# Patient Record
Sex: MALE | NOT HISPANIC OR LATINO | Employment: OTHER | URBAN - NONMETROPOLITAN AREA
[De-identification: names, ages, dates, MRNs, and addresses within clinical notes are randomized per-mention and may not be internally consistent; named-entity substitution may affect disease eponyms.]

---

## 2023-02-27 PROBLEM — M79.605 LEG PAIN, LEFT: Status: ACTIVE | Noted: 2023-02-27

## 2023-02-27 PROBLEM — I83.90 VARICOSE VEIN OF LEG: Status: ACTIVE | Noted: 2023-02-27

## 2023-02-27 PROBLEM — D49.2 NEOPLASM OF SKIN OF NOSE: Status: ACTIVE | Noted: 2023-02-27

## 2023-02-27 PROBLEM — E53.8 VITAMIN B12 DEFICIENCY: Status: ACTIVE | Noted: 2023-02-27

## 2023-02-27 PROBLEM — R60.0 LOWER LEG EDEMA: Status: ACTIVE | Noted: 2023-02-27

## 2023-02-27 PROBLEM — L57.0 ACTINIC KERATOSES: Status: ACTIVE | Noted: 2023-02-27

## 2023-02-27 PROBLEM — I10 BENIGN ESSENTIAL HYPERTENSION: Status: ACTIVE | Noted: 2023-02-27

## 2023-02-27 PROBLEM — D49.2 SKIN NEOPLASM: Status: ACTIVE | Noted: 2023-02-27

## 2023-02-27 PROBLEM — C44.321 SQUAMOUS CELL CARCINOMA OF NOSE: Status: ACTIVE | Noted: 2023-02-27

## 2023-02-27 PROBLEM — G20.A1 PARKINSON'S DISEASE (MULTI): Status: ACTIVE | Noted: 2023-02-27

## 2023-02-27 PROBLEM — E11.65 TYPE 2 DIABETES MELLITUS WITH HYPERGLYCEMIA, WITHOUT LONG-TERM CURRENT USE OF INSULIN (MULTI): Status: ACTIVE | Noted: 2023-02-27

## 2023-02-27 PROBLEM — C61 MALIGNANT NEOPLASM OF PROSTATE (MULTI): Status: ACTIVE | Noted: 2023-02-27

## 2023-02-27 PROBLEM — H40.9 GLAUCOMA: Status: ACTIVE | Noted: 2023-02-27

## 2023-02-27 PROBLEM — E11.42 POLYNEUROPATHY, DIABETIC (MULTI): Status: ACTIVE | Noted: 2023-02-27

## 2023-02-27 PROBLEM — H26.9 CATARACTS, BILATERAL: Status: ACTIVE | Noted: 2023-02-27

## 2023-02-27 PROBLEM — M25.9 DISORDER OF JOINT OF PELVIC REGION AND THIGH: Status: ACTIVE | Noted: 2023-02-27

## 2023-02-27 PROBLEM — K40.90 INGUINAL HERNIA, LEFT: Status: ACTIVE | Noted: 2023-02-27

## 2023-02-27 PROBLEM — G56.20 ULNAR NERVE PALSY: Status: ACTIVE | Noted: 2023-02-27

## 2023-02-27 PROBLEM — B35.3 TINEA PEDIS OF BOTH FEET: Status: ACTIVE | Noted: 2023-02-27

## 2023-02-27 PROBLEM — C44.311 BASAL CELL CARCINOMA OF RIGHT SIDE OF NOSE: Status: ACTIVE | Noted: 2023-02-27

## 2023-02-27 PROBLEM — I48.19 PERSISTENT ATRIAL FIBRILLATION (MULTI): Status: ACTIVE | Noted: 2023-02-27

## 2023-02-27 PROBLEM — D72.829 LEUKOCYTOSIS: Status: ACTIVE | Noted: 2023-02-27

## 2023-02-27 PROBLEM — R41.3 MEMORY DYSFUNCTION: Status: ACTIVE | Noted: 2023-02-27

## 2023-02-27 PROBLEM — E55.9 VITAMIN D DEFICIENCY: Status: ACTIVE | Noted: 2023-02-27

## 2023-02-27 PROBLEM — L72.0 EPIDERMAL CYST: Status: ACTIVE | Noted: 2023-02-27

## 2023-02-27 PROBLEM — R63.4 ABNORMAL WEIGHT LOSS: Status: ACTIVE | Noted: 2023-02-27

## 2023-02-27 PROBLEM — E78.00 ELEVATED LOW DENSITY LIPOPROTEIN (LDL) CHOLESTEROL LEVEL: Status: ACTIVE | Noted: 2023-02-27

## 2023-02-27 PROBLEM — R41.3 POOR MEMORY: Status: ACTIVE | Noted: 2023-02-27

## 2023-02-27 PROBLEM — E83.42 HYPOMAGNESEMIA: Status: ACTIVE | Noted: 2023-02-27

## 2023-02-27 RX ORDER — LISINOPRIL AND HYDROCHLOROTHIAZIDE 12.5; 2 MG/1; MG/1
1 TABLET ORAL DAILY
COMMUNITY
Start: 2012-06-15 | End: 2023-07-24 | Stop reason: SDUPTHER

## 2023-02-27 RX ORDER — METOPROLOL TARTRATE 25 MG/1
1 TABLET, FILM COATED ORAL 2 TIMES DAILY
COMMUNITY
Start: 2022-01-13 | End: 2023-07-24 | Stop reason: SDUPTHER

## 2023-02-27 RX ORDER — GLIPIZIDE 5 MG/1
1 TABLET ORAL 2 TIMES DAILY
COMMUNITY
Start: 2014-03-13 | End: 2023-07-24 | Stop reason: SDUPTHER

## 2023-02-27 RX ORDER — CYANOCOBALAMIN 1000 UG/ML
1 INJECTION, SOLUTION INTRAMUSCULAR; SUBCUTANEOUS
COMMUNITY
Start: 2014-08-27 | End: 2023-11-08 | Stop reason: WASHOUT

## 2023-02-27 RX ORDER — SIMVASTATIN 20 MG/1
1 TABLET, FILM COATED ORAL NIGHTLY
COMMUNITY
Start: 2012-06-15 | End: 2023-07-07

## 2023-02-27 RX ORDER — BLOOD SUGAR DIAGNOSTIC
STRIP MISCELLANEOUS
COMMUNITY
Start: 2012-06-15 | End: 2023-11-08 | Stop reason: WASHOUT

## 2023-02-27 RX ORDER — METFORMIN HYDROCHLORIDE 1000 MG/1
1 TABLET ORAL
COMMUNITY
Start: 2012-06-15 | End: 2023-07-24 | Stop reason: SDUPTHER

## 2023-02-27 RX ORDER — KETOCONAZOLE 20 MG/G
CREAM TOPICAL
COMMUNITY
Start: 2018-05-15 | End: 2023-11-08 | Stop reason: WASHOUT

## 2023-02-27 RX ORDER — ACETAMINOPHEN 500 MG
1 TABLET ORAL DAILY
COMMUNITY
End: 2023-11-08 | Stop reason: WASHOUT

## 2023-03-27 ENCOUNTER — CLINICAL SUPPORT (OUTPATIENT)
Dept: PRIMARY CARE | Facility: CLINIC | Age: 83
End: 2023-03-27
Payer: MEDICARE

## 2023-03-27 DIAGNOSIS — E53.8 VITAMIN B12 DEFICIENCY: ICD-10-CM

## 2023-03-27 DIAGNOSIS — E53.8 VITAMIN B12 DEFICIENCY: Primary | ICD-10-CM

## 2023-03-27 PROCEDURE — 96372 THER/PROPH/DIAG INJ SC/IM: CPT | Performed by: FAMILY MEDICINE

## 2023-03-27 RX ORDER — CYANOCOBALAMIN 1000 UG/ML
1000 INJECTION, SOLUTION INTRAMUSCULAR; SUBCUTANEOUS ONCE
Status: COMPLETED | OUTPATIENT
Start: 2023-03-27 | End: 2023-03-27

## 2023-03-27 RX ADMIN — CYANOCOBALAMIN 1000 MCG: 1000 INJECTION, SOLUTION INTRAMUSCULAR; SUBCUTANEOUS at 08:07

## 2023-04-07 NOTE — PROGRESS NOTES
Subjective   Patient ID: Johnnie Rudolph is a 82 y.o. male who presents for No chief complaint on file..    HPI     Review of Systems    Objective   There were no vitals taken for this visit.    Physical Exam    Assessment/Plan

## 2023-04-24 ENCOUNTER — CLINICAL SUPPORT (OUTPATIENT)
Dept: PRIMARY CARE | Facility: CLINIC | Age: 83
End: 2023-04-24
Payer: MEDICARE

## 2023-04-24 DIAGNOSIS — E53.8 VITAMIN B12 DEFICIENCY: Primary | ICD-10-CM

## 2023-04-24 PROCEDURE — 96372 THER/PROPH/DIAG INJ SC/IM: CPT | Performed by: FAMILY MEDICINE

## 2023-04-24 RX ORDER — CYANOCOBALAMIN 1000 UG/ML
1000 INJECTION, SOLUTION INTRAMUSCULAR; SUBCUTANEOUS
Status: DISCONTINUED | OUTPATIENT
Start: 2023-04-25 | End: 2023-10-19

## 2023-04-24 RX ADMIN — CYANOCOBALAMIN 1000 MCG: 1000 INJECTION, SOLUTION INTRAMUSCULAR; SUBCUTANEOUS at 10:20

## 2023-05-31 ENCOUNTER — CLINICAL SUPPORT (OUTPATIENT)
Dept: PRIMARY CARE | Facility: CLINIC | Age: 83
End: 2023-05-31
Payer: MEDICARE

## 2023-05-31 DIAGNOSIS — E53.8 VITAMIN B12 DEFICIENCY: Primary | ICD-10-CM

## 2023-05-31 PROCEDURE — 96372 THER/PROPH/DIAG INJ SC/IM: CPT | Performed by: FAMILY MEDICINE

## 2023-05-31 RX ORDER — CYANOCOBALAMIN 1000 UG/ML
1000 INJECTION, SOLUTION INTRAMUSCULAR; SUBCUTANEOUS ONCE
Status: COMPLETED | OUTPATIENT
Start: 2023-05-31 | End: 2023-05-31

## 2023-05-31 RX ADMIN — CYANOCOBALAMIN 1000 MCG: 1000 INJECTION, SOLUTION INTRAMUSCULAR; SUBCUTANEOUS at 08:08

## 2023-06-28 ENCOUNTER — CLINICAL SUPPORT (OUTPATIENT)
Dept: PRIMARY CARE | Facility: CLINIC | Age: 83
End: 2023-06-28
Payer: MEDICARE

## 2023-06-28 DIAGNOSIS — E53.8 VITAMIN B12 DEFICIENCY: ICD-10-CM

## 2023-06-28 PROCEDURE — 96372 THER/PROPH/DIAG INJ SC/IM: CPT | Performed by: FAMILY MEDICINE

## 2023-06-28 RX ADMIN — CYANOCOBALAMIN 1000 MCG: 1000 INJECTION, SOLUTION INTRAMUSCULAR; SUBCUTANEOUS at 09:12

## 2023-07-07 DIAGNOSIS — E78.00 ELEVATED LOW DENSITY LIPOPROTEIN (LDL) CHOLESTEROL LEVEL: Primary | ICD-10-CM

## 2023-07-07 RX ORDER — SIMVASTATIN 20 MG/1
TABLET, FILM COATED ORAL
Qty: 30 TABLET | Refills: 0 | Status: SHIPPED | OUTPATIENT
Start: 2023-07-07 | End: 2023-07-24 | Stop reason: SDUPTHER

## 2023-07-24 ENCOUNTER — OFFICE VISIT (OUTPATIENT)
Dept: PRIMARY CARE | Facility: CLINIC | Age: 83
End: 2023-07-24
Payer: MEDICARE

## 2023-07-24 VITALS
BODY MASS INDEX: 27.44 KG/M2 | DIASTOLIC BLOOD PRESSURE: 60 MMHG | OXYGEN SATURATION: 98 % | WEIGHT: 208 LBS | HEART RATE: 64 BPM | SYSTOLIC BLOOD PRESSURE: 132 MMHG

## 2023-07-24 DIAGNOSIS — G20.A1 PARKINSON'S DISEASE (MULTI): ICD-10-CM

## 2023-07-24 DIAGNOSIS — E53.8 VITAMIN B12 DEFICIENCY: ICD-10-CM

## 2023-07-24 DIAGNOSIS — C44.321 SQUAMOUS CELL CARCINOMA OF NOSE: ICD-10-CM

## 2023-07-24 DIAGNOSIS — E11.65 TYPE 2 DIABETES MELLITUS WITH HYPERGLYCEMIA, WITHOUT LONG-TERM CURRENT USE OF INSULIN (MULTI): Primary | ICD-10-CM

## 2023-07-24 DIAGNOSIS — C61 MALIGNANT NEOPLASM OF PROSTATE (MULTI): ICD-10-CM

## 2023-07-24 DIAGNOSIS — I48.19 PERSISTENT ATRIAL FIBRILLATION (MULTI): ICD-10-CM

## 2023-07-24 DIAGNOSIS — S80.811A CAT SCRATCH OF RIGHT LOWER LEG, INITIAL ENCOUNTER: ICD-10-CM

## 2023-07-24 DIAGNOSIS — E78.00 ELEVATED LOW DENSITY LIPOPROTEIN (LDL) CHOLESTEROL LEVEL: ICD-10-CM

## 2023-07-24 DIAGNOSIS — E11.42 DIABETIC POLYNEUROPATHY ASSOCIATED WITH TYPE 2 DIABETES MELLITUS (MULTI): ICD-10-CM
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
## 2023-07-24 DIAGNOSIS — L57.0 ACTINIC KERATOSES: ICD-10-CM

## 2023-07-24 DIAGNOSIS — I10 BENIGN ESSENTIAL HYPERTENSION: ICD-10-CM

## 2023-07-24 PROBLEM — D49.2 SKIN NEOPLASM: Status: RESOLVED | Noted: 2023-02-27 | Resolved: 2023-07-24

## 2023-07-24 PROBLEM — D49.2 NEOPLASM OF SKIN OF NOSE: Status: RESOLVED | Noted: 2023-02-27 | Resolved: 2023-07-24

## 2023-07-24 LAB
ALBUMIN (MG/L) IN URINE: 30.5 MG/L
ALBUMIN/CREATININE (UG/MG) IN URINE: 28.5 UG/MG CRT (ref 0–30)
ANION GAP IN SER/PLAS: 17 MMOL/L (ref 10–20)
CALCIUM (MG/DL) IN SER/PLAS: 9.3 MG/DL (ref 8.6–10.3)
CARBON DIOXIDE, TOTAL (MMOL/L) IN SER/PLAS: 27 MMOL/L (ref 21–32)
CHLORIDE (MMOL/L) IN SER/PLAS: 101 MMOL/L (ref 98–107)
CHOLESTEROL (MG/DL) IN SER/PLAS: 154 MG/DL (ref 0–199)
CHOLESTEROL IN HDL (MG/DL) IN SER/PLAS: 55.6 MG/DL
CHOLESTEROL/HDL RATIO: 2.8
CREATININE (MG/DL) IN SER/PLAS: 0.84 MG/DL (ref 0.5–1.3)
CREATININE (MG/DL) IN URINE: 107 MG/DL (ref 20–370)
GFR MALE: 87 ML/MIN/1.73M2
GLUCOSE (MG/DL) IN SER/PLAS: 128 MG/DL (ref 74–99)
LDL: 86 MG/DL (ref 0–99)
POC HEMOGLOBIN A1C: 7.2 % (ref 4.2–6.5)
POTASSIUM (MMOL/L) IN SER/PLAS: 4.7 MMOL/L (ref 3.5–5.3)
SODIUM (MMOL/L) IN SER/PLAS: 140 MMOL/L (ref 136–145)
TRIGLYCERIDE (MG/DL) IN SER/PLAS: 64 MG/DL (ref 0–149)
UREA NITROGEN (MG/DL) IN SER/PLAS: 21 MG/DL (ref 6–23)
VLDL: 13 MG/DL (ref 0–40)

## 2023-07-24 PROCEDURE — 3078F DIAST BP <80 MM HG: CPT | Performed by: FAMILY MEDICINE

## 2023-07-24 PROCEDURE — 3075F SYST BP GE 130 - 139MM HG: CPT | Performed by: FAMILY MEDICINE

## 2023-07-24 PROCEDURE — 1159F MED LIST DOCD IN RCRD: CPT | Performed by: FAMILY MEDICINE

## 2023-07-24 PROCEDURE — 1036F TOBACCO NON-USER: CPT | Performed by: FAMILY MEDICINE

## 2023-07-24 PROCEDURE — 1170F FXNL STATUS ASSESSED: CPT | Performed by: FAMILY MEDICINE

## 2023-07-24 PROCEDURE — 90471 IMMUNIZATION ADMIN: CPT | Performed by: FAMILY MEDICINE

## 2023-07-24 PROCEDURE — 90714 TD VACC NO PRESV 7 YRS+ IM: CPT | Performed by: FAMILY MEDICINE

## 2023-07-24 PROCEDURE — 82043 UR ALBUMIN QUANTITATIVE: CPT

## 2023-07-24 PROCEDURE — 82570 ASSAY OF URINE CREATININE: CPT

## 2023-07-24 PROCEDURE — 1160F RVW MEDS BY RX/DR IN RCRD: CPT | Performed by: FAMILY MEDICINE

## 2023-07-24 PROCEDURE — 96372 THER/PROPH/DIAG INJ SC/IM: CPT | Performed by: FAMILY MEDICINE

## 2023-07-24 PROCEDURE — 99214 OFFICE O/P EST MOD 30 MIN: CPT | Performed by: FAMILY MEDICINE

## 2023-07-24 PROCEDURE — 80061 LIPID PANEL: CPT

## 2023-07-24 PROCEDURE — 83036 HEMOGLOBIN GLYCOSYLATED A1C: CPT | Performed by: FAMILY MEDICINE

## 2023-07-24 PROCEDURE — 80048 BASIC METABOLIC PNL TOTAL CA: CPT

## 2023-07-24 PROCEDURE — 99397 PER PM REEVAL EST PAT 65+ YR: CPT | Performed by: FAMILY MEDICINE

## 2023-07-24 PROCEDURE — G0439 PPPS, SUBSEQ VISIT: HCPCS | Performed by: FAMILY MEDICINE

## 2023-07-24 RX ORDER — METFORMIN HYDROCHLORIDE 1000 MG/1
1000 TABLET ORAL
Qty: 180 TABLET | Refills: 1 | Status: SHIPPED | OUTPATIENT
Start: 2023-07-24 | End: 2024-01-20

## 2023-07-24 RX ORDER — LISINOPRIL AND HYDROCHLOROTHIAZIDE 12.5; 2 MG/1; MG/1
1 TABLET ORAL DAILY
Qty: 90 TABLET | Refills: 1 | Status: SHIPPED | OUTPATIENT
Start: 2023-07-24 | End: 2023-10-19 | Stop reason: SDUPTHER

## 2023-07-24 RX ORDER — SIMVASTATIN 20 MG/1
20 TABLET, FILM COATED ORAL NIGHTLY
Qty: 90 TABLET | Refills: 1 | Status: SHIPPED | OUTPATIENT
Start: 2023-07-24 | End: 2023-10-14 | Stop reason: HOSPADM

## 2023-07-24 RX ORDER — GLIPIZIDE 5 MG/1
5 TABLET ORAL 2 TIMES DAILY
Qty: 180 TABLET | Refills: 1 | Status: SHIPPED | OUTPATIENT
Start: 2023-07-24 | End: 2023-11-21 | Stop reason: SDUPTHER

## 2023-07-24 RX ORDER — METOPROLOL TARTRATE 25 MG/1
25 TABLET, FILM COATED ORAL 2 TIMES DAILY
Qty: 180 TABLET | Refills: 1 | Status: SHIPPED | OUTPATIENT
Start: 2023-07-24 | End: 2024-01-20

## 2023-07-24 RX ADMIN — CYANOCOBALAMIN 1000 MCG: 1000 INJECTION, SOLUTION INTRAMUSCULAR; SUBCUTANEOUS at 08:05

## 2023-07-24 ASSESSMENT — PATIENT HEALTH QUESTIONNAIRE - PHQ9
1. LITTLE INTEREST OR PLEASURE IN DOING THINGS: NOT AT ALL
2. FEELING DOWN, DEPRESSED OR HOPELESS: NOT AT ALL
SUM OF ALL RESPONSES TO PHQ9 QUESTIONS 1 AND 2: 0

## 2023-07-24 ASSESSMENT — ACTIVITIES OF DAILY LIVING (ADL)
GROCERY_SHOPPING: INDEPENDENT
MANAGING_FINANCES: INDEPENDENT
TAKING_MEDICATION: INDEPENDENT
DOING_HOUSEWORK: INDEPENDENT
BATHING: INDEPENDENT
DRESSING: INDEPENDENT

## 2023-07-24 NOTE — PATIENT INSTRUCTIONS
Shingles Vaccines recommended.  Continue to follow up with dermatology for actinic keratosis these can turn to cancer  Follow up 6 months   Have eye exam of eyes  Prevnar 20 vaccine

## 2023-07-24 NOTE — PROGRESS NOTES
Subjective   Reason for Visit: Johnnie Rudolph is an 83 y.o. male here for a Medicare Wellness visit.     Past Medical, Surgical, and Family History reviewed and updated in chart.    Reviewed all medications by prescribing practitioner or clinical pharmacist (such as prescriptions, OTCs, herbal therapies and supplements) and documented in the medical record.    HPI  For 6 month checkup many medical conditions occluding hypertension diabetes cognitive deficits/mild dementia persistent atrial fibrillation hypercholesterolemia skin cancer actinic keratosis glaucoma  Scratched by cat 2 weeks ago lower R leg   Needs eye exam  Feet numbness same   Does not see podiatry  Has had problems with tinea pedis in the past has longstanding prescription ketoconazole  Has onychomycosis    Patient Care Team:  Jason Olvera DO as PCP - General  Jason Olvera DO as PCP - [employer] PCP     Review of Systems    Objective   Vitals:  /60   Pulse 64   Wt 94.3 kg (208 lb)   SpO2 98%   BMI 27.44 kg/m²       Physical Exam  Cardiovascular:      Pulses:           Dorsalis pedis pulses are 1+ on the right side and 1+ on the left side.        Posterior tibial pulses are 1+ on the right side and 1+ on the left side.   Musculoskeletal:      Right foot: Normal range of motion. No deformity, bunion or prominent metatarsal heads.      Left foot: Normal range of motion. No deformity or prominent metatarsal heads.   Feet:      Right foot:      Protective Sensation: 6 sites tested.  3 sites sensed.      Skin integrity: Callus, dry skin and fissure present.      Toenail Condition: Right toenails are abnormally thick. Fungal disease present.     Left foot:      Protective Sensation: 6 sites tested.  4 sites sensed.      Skin integrity: Callus, dry skin and fissure present.      Toenail Condition: Left toenails are abnormally thick. Fungal disease present.      General: alert, no apparent distress  HEAD:  Normocephalic,  atraumatic,scalp covered in actinic keratosis     EARS:  EAC patent, TMs normal,   EYES:  sclera white, CALEB, conjunctiva noninjected  NOSE: Nasal passages patent , scar from skin Cancer removal   MOUTH: Pharynx clear, tongue uvula midline, grade 3 airway  Neck:  supple, no masses, thyroid non enlarged non nodular, no cervical adenopathy,  Lungs:  no wheezing, no rales , no rhonchi, normal respiratory pattern, breath sounds not diminished  Heart:  irregular rate and  rhythm   Abdomen:  soft NT,BS + ,  no organomegaly, no masses, no bruits  Extremities:  severe varicosities both legs, crusted area right lower leg,  sl eryth from cat scratch not healing trace  edema, no cyanosis, no clubbing,  2+ posterior tibialis pulse    Psych:  speech fluent, normal affect, answers most questions appropriately, have a hard time remembering last office visit with ophthalmologist name of person who took care of his skin cancer on his nose  Skin:  no rashes, no concerning skin lesions, normal texture      Assessment/Plan   Problem List Items Addressed This Visit       Actinic keratoses    Benign essential hypertension    Relevant Medications    lisinopriL-hydrochlorothiazide 20-12.5 mg tablet    metoprolol tartrate (Lopressor) 25 mg tablet    Other Relevant Orders    Lipid Panel (Completed)    Basic Metabolic Panel (Completed)    Cat scratch of right lower leg    Relevant Orders    Td vaccine, age 7 years and older (TENIVAC) (Completed)    Elevated low density lipoprotein (LDL) cholesterol level    Relevant Medications    simvastatin (Zocor) 20 mg tablet    Malignant neoplasm of prostate (CMS/HCC)    Parkinson's disease (CMS/HCC)    Persistent atrial fibrillation (CMS/HCC)    Relevant Medications    metoprolol tartrate (Lopressor) 25 mg tablet    rivaroxaban (Xarelto) 20 mg tablet    Polyneuropathy, diabetic (CMS/HCC)    Relevant Orders    POCT glycosylated hemoglobin (Hb A1C) manually resulted (Completed)    Albumin , Urine Random  (Completed)    Squamous cell carcinoma of nose    Type 2 diabetes mellitus with hyperglycemia, without long-term current use of insulin (CMS/HCC) - Primary    Relevant Medications    glipiZIDE (Glucotrol) 5 mg tablet    metFORMIN (Glucophage) 1,000 mg tablet    Other Relevant Orders    Lipid Panel (Completed)    Basic Metabolic Panel (Completed)    POCT glycosylated hemoglobin (Hb A1C) manually resulted (Completed)    Albumin , Urine Random (Completed)    Vitamin B12 deficiency   Medications renewed.  Patient was given tetanus booster because of cat scratch open area of lower leg crusted over mild erythema.  Highly encourage patient see ophthalmologist.  Patient with fairly high CHADS2 score he does have hypertension diabetes persistent atrial fibrillation and is over 76  Patient not even taken an aspirin at this point he declined medicines in the past discussed he is very high risk of stroke discussed possible bleeding he denies any episodes currently or remotely.  Did have a fall and many years ago over 11 had a subdural hematoma.  At 21 was still .  Patient agreeable to try Xarelto it may depend on cost however.  Patient poor historian at this point I would not trust him taking warfarin.   Highly encourage patient to have Shingrix vaccine at pharmacy and also Prevnar 20.

## 2023-08-25 ENCOUNTER — APPOINTMENT (OUTPATIENT)
Dept: PRIMARY CARE | Facility: CLINIC | Age: 83
End: 2023-08-25
Payer: MEDICARE

## 2023-08-28 ENCOUNTER — CLINICAL SUPPORT (OUTPATIENT)
Dept: PRIMARY CARE | Facility: CLINIC | Age: 83
End: 2023-08-28
Payer: MEDICARE

## 2023-08-28 DIAGNOSIS — E53.8 VITAMIN B12 DEFICIENCY: Primary | ICD-10-CM

## 2023-08-28 DIAGNOSIS — E53.8 VITAMIN B12 DEFICIENCY: ICD-10-CM

## 2023-08-28 PROCEDURE — 96372 THER/PROPH/DIAG INJ SC/IM: CPT | Performed by: FAMILY MEDICINE

## 2023-08-28 RX ORDER — CYANOCOBALAMIN 1000 UG/ML
1000 INJECTION, SOLUTION INTRAMUSCULAR; SUBCUTANEOUS
Status: DISCONTINUED | OUTPATIENT
Start: 2023-08-29 | End: 2023-10-19

## 2023-08-28 RX ADMIN — CYANOCOBALAMIN 1000 MCG: 1000 INJECTION, SOLUTION INTRAMUSCULAR; SUBCUTANEOUS at 17:35

## 2023-09-25 ENCOUNTER — CLINICAL SUPPORT (OUTPATIENT)
Dept: PRIMARY CARE | Facility: CLINIC | Age: 83
End: 2023-09-25
Payer: MEDICARE

## 2023-09-25 DIAGNOSIS — E53.8 VITAMIN B12 DEFICIENCY: ICD-10-CM

## 2023-09-25 PROCEDURE — 96372 THER/PROPH/DIAG INJ SC/IM: CPT | Performed by: FAMILY MEDICINE

## 2023-09-25 RX ORDER — CYANOCOBALAMIN 1000 UG/ML
1000 INJECTION, SOLUTION INTRAMUSCULAR; SUBCUTANEOUS ONCE
Status: COMPLETED | OUTPATIENT
Start: 2023-09-25 | End: 2023-09-25

## 2023-09-25 RX ADMIN — CYANOCOBALAMIN 1000 MCG: 1000 INJECTION, SOLUTION INTRAMUSCULAR; SUBCUTANEOUS at 08:04

## 2023-10-12 ENCOUNTER — APPOINTMENT (OUTPATIENT)
Dept: RADIOLOGY | Facility: HOSPITAL | Age: 83
DRG: 065 | End: 2023-10-12
Payer: MEDICARE

## 2023-10-12 ENCOUNTER — HOSPITAL ENCOUNTER (INPATIENT)
Facility: HOSPITAL | Age: 83
LOS: 1 days | Discharge: HOME | DRG: 065 | End: 2023-10-14
Attending: STUDENT IN AN ORGANIZED HEALTH CARE EDUCATION/TRAINING PROGRAM | Admitting: INTERNAL MEDICINE
Payer: MEDICARE

## 2023-10-12 DIAGNOSIS — I82.4Z2 DVT, LOWER EXTREMITY, DISTAL, ACUTE, LEFT (MULTI): ICD-10-CM

## 2023-10-12 DIAGNOSIS — R29.90 STROKE-LIKE SYMPTOMS: Primary | ICD-10-CM

## 2023-10-12 DIAGNOSIS — R60.0 LOCALIZED EDEMA: ICD-10-CM

## 2023-10-12 DIAGNOSIS — I63.89 OTHER CEREBRAL INFARCTION (MULTI): ICD-10-CM

## 2023-10-12 DIAGNOSIS — I48.19 PERSISTENT ATRIAL FIBRILLATION (MULTI): ICD-10-CM

## 2023-10-12 LAB
ALBUMIN SERPL BCP-MCNC: 3.8 G/DL (ref 3.4–5)
ALBUMIN SERPL BCP-MCNC: NORMAL G/DL
ALP SERPL-CCNC: 81 U/L (ref 33–136)
ALP SERPL-CCNC: NORMAL U/L
ALT SERPL W P-5'-P-CCNC: 16 U/L (ref 10–52)
ALT SERPL W P-5'-P-CCNC: NORMAL U/L
ANION GAP BLDV CALCULATED.4IONS-SCNC: 5 MMOL/L (ref 10–25)
ANION GAP SERPL CALC-SCNC: 12 MMOL/L (ref 10–20)
ANION GAP SERPL CALC-SCNC: NORMAL MMOL/L
APPARATUS: ABNORMAL
APPEARANCE UR: CLEAR
APTT PPP: 33 SECONDS (ref 27–38)
AST SERPL W P-5'-P-CCNC: 18 U/L (ref 9–39)
AST SERPL W P-5'-P-CCNC: NORMAL U/L
BASE EXCESS BLDV CALC-SCNC: 1.3 MMOL/L (ref -2–3)
BASOPHILS # BLD AUTO: 0.05 X10*3/UL (ref 0–0.1)
BASOPHILS NFR BLD AUTO: 0.7 %
BILIRUB SERPL-MCNC: 0.9 MG/DL (ref 0–1.2)
BILIRUB SERPL-MCNC: NORMAL MG/DL
BILIRUB UR STRIP.AUTO-MCNC: NEGATIVE MG/DL
BODY TEMPERATURE: 37 DEGREES CELSIUS
BUN SERPL-MCNC: 19 MG/DL (ref 6–23)
BUN SERPL-MCNC: NORMAL MG/DL
CA-I BLDV-SCNC: 1.11 MMOL/L (ref 1.1–1.33)
CALCIUM SERPL-MCNC: 8.8 MG/DL (ref 8.6–10.3)
CALCIUM SERPL-MCNC: NORMAL MG/DL
CARDIAC TROPONIN I PNL SERPL HS: 6 NG/L (ref 0–20)
CHLORIDE BLDV-SCNC: 110 MMOL/L (ref 98–107)
CHLORIDE SERPL-SCNC: 105 MMOL/L (ref 98–107)
CHLORIDE SERPL-SCNC: NORMAL MMOL/L
CO2 SERPL-SCNC: 26 MMOL/L (ref 21–32)
CO2 SERPL-SCNC: NORMAL MMOL/L
COHGB MFR BLDV: 1.3 %
COLOR UR: YELLOW
CREAT SERPL-MCNC: 0.79 MG/DL (ref 0.5–1.3)
CREAT SERPL-MCNC: NORMAL MG/DL
EOSINOPHIL # BLD AUTO: 0.1 X10*3/UL (ref 0–0.4)
EOSINOPHIL NFR BLD AUTO: 1.4 %
ERYTHROCYTE [DISTWIDTH] IN BLOOD BY AUTOMATED COUNT: 13.2 % (ref 11.5–14.5)
FLOW: 15 LPM
GFR SERPL CREATININE-BSD FRML MDRD: 89 ML/MIN/1.73M*2
GFR SERPL CREATININE-BSD FRML MDRD: NORMAL ML/MIN/{1.73_M2}
GLUCOSE BLD MANUAL STRIP-MCNC: 164 MG/DL (ref 74–99)
GLUCOSE BLDV-MCNC: 177 MG/DL (ref 74–99)
GLUCOSE SERPL-MCNC: 145 MG/DL (ref 74–99)
GLUCOSE SERPL-MCNC: NORMAL MG/DL
GLUCOSE UR STRIP.AUTO-MCNC: ABNORMAL MG/DL
HCO3 BLDV-SCNC: 25.9 MMOL/L (ref 22–26)
HCT VFR BLD AUTO: 42.6 % (ref 41–52)
HCT VFR BLD EST: 38 % (ref 41–52)
HGB BLD-MCNC: 14.2 G/DL (ref 13.5–17.5)
HGB BLDV-MCNC: 12.5 G/DL (ref 13.5–17.5)
IMM GRANULOCYTES # BLD AUTO: 0.06 X10*3/UL (ref 0–0.5)
IMM GRANULOCYTES NFR BLD AUTO: 0.8 % (ref 0–0.9)
INR PPP: 1.2 (ref 0.9–1.1)
KETONES UR STRIP.AUTO-MCNC: ABNORMAL MG/DL
LACTATE BLDV-SCNC: 1.9 MMOL/L (ref 0.4–2)
LEUKOCYTE ESTERASE UR QL STRIP.AUTO: NEGATIVE
LYMPHOCYTES # BLD AUTO: 1.23 X10*3/UL (ref 0.8–3)
LYMPHOCYTES NFR BLD AUTO: 17.1 %
MCH RBC QN AUTO: 31.5 PG (ref 26–34)
MCHC RBC AUTO-ENTMCNC: 33.3 G/DL (ref 32–36)
MCV RBC AUTO: 95 FL (ref 80–100)
METHGB MFR BLDV: 1.3 % (ref 0–1.5)
MONOCYTES # BLD AUTO: 0.78 X10*3/UL (ref 0.05–0.8)
MONOCYTES NFR BLD AUTO: 10.9 %
NEUTROPHILS # BLD AUTO: 4.96 X10*3/UL (ref 1.6–5.5)
NEUTROPHILS NFR BLD AUTO: 69.1 %
NITRITE UR QL STRIP.AUTO: NEGATIVE
NRBC BLD-RTO: 0 /100 WBCS (ref 0–0)
OXYHGB MFR BLDV: 72.3 % (ref 45–75)
PCO2 BLDV: 40 MM HG (ref 41–51)
PH BLDV: 7.42 PH (ref 7.33–7.43)
PH UR STRIP.AUTO: 6 [PH]
PLATELET # BLD AUTO: 236 X10*3/UL (ref 150–450)
PMV BLD AUTO: 10.2 FL (ref 7.5–11.5)
PO2 BLDV: 44 MM HG (ref 35–45)
POTASSIUM BLDV-SCNC: 3.7 MMOL/L (ref 3.5–5.3)
POTASSIUM SERPL-SCNC: 3.6 MMOL/L (ref 3.5–5.3)
POTASSIUM SERPL-SCNC: NORMAL MMOL/L
PROT SERPL-MCNC: 6.4 G/DL (ref 6.4–8.2)
PROT SERPL-MCNC: NORMAL G/DL
PROT UR STRIP.AUTO-MCNC: ABNORMAL MG/DL
PROTHROMBIN TIME: 13.7 SECONDS (ref 9.8–12.8)
RBC # BLD AUTO: 4.51 X10*6/UL (ref 4.5–5.9)
RBC # UR STRIP.AUTO: ABNORMAL /UL
RBC #/AREA URNS AUTO: NORMAL /HPF
SAO2 % BLDV: 74 % (ref 45–75)
SODIUM BLDV-SCNC: 137 MMOL/L (ref 136–145)
SODIUM SERPL-SCNC: 139 MMOL/L (ref 136–145)
SODIUM SERPL-SCNC: NORMAL MMOL/L
SP GR UR STRIP.AUTO: 1.02
UROBILINOGEN UR STRIP.AUTO-MCNC: 2 MG/DL
WBC # BLD AUTO: 7.2 X10*3/UL (ref 4.4–11.3)
WBC #/AREA URNS AUTO: NORMAL /HPF

## 2023-10-12 PROCEDURE — 70450 CT HEAD/BRAIN W/O DYE: CPT | Performed by: RADIOLOGY

## 2023-10-12 PROCEDURE — 81003 URINALYSIS AUTO W/O SCOPE: CPT | Performed by: STUDENT IN AN ORGANIZED HEALTH CARE EDUCATION/TRAINING PROGRAM

## 2023-10-12 PROCEDURE — 36415 COLL VENOUS BLD VENIPUNCTURE: CPT | Performed by: STUDENT IN AN ORGANIZED HEALTH CARE EDUCATION/TRAINING PROGRAM

## 2023-10-12 PROCEDURE — 85018 HEMOGLOBIN: CPT

## 2023-10-12 PROCEDURE — 85025 COMPLETE CBC W/AUTO DIFF WBC: CPT | Performed by: STUDENT IN AN ORGANIZED HEALTH CARE EDUCATION/TRAINING PROGRAM

## 2023-10-12 PROCEDURE — 82947 ASSAY GLUCOSE BLOOD QUANT: CPT

## 2023-10-12 PROCEDURE — 83718 ASSAY OF LIPOPROTEIN: CPT

## 2023-10-12 PROCEDURE — 85610 PROTHROMBIN TIME: CPT | Performed by: STUDENT IN AN ORGANIZED HEALTH CARE EDUCATION/TRAINING PROGRAM

## 2023-10-12 PROCEDURE — 85730 THROMBOPLASTIN TIME PARTIAL: CPT | Performed by: STUDENT IN AN ORGANIZED HEALTH CARE EDUCATION/TRAINING PROGRAM

## 2023-10-12 PROCEDURE — 70450 CT HEAD/BRAIN W/O DYE: CPT | Mod: MG

## 2023-10-12 PROCEDURE — 82330 ASSAY OF CALCIUM: CPT

## 2023-10-12 PROCEDURE — 83050 HGB METHEMOGLOBIN QUAN: CPT

## 2023-10-12 PROCEDURE — 99285 EMERGENCY DEPT VISIT HI MDM: CPT | Performed by: STUDENT IN AN ORGANIZED HEALTH CARE EDUCATION/TRAINING PROGRAM

## 2023-10-12 PROCEDURE — 82435 ASSAY OF BLOOD CHLORIDE: CPT | Performed by: STUDENT IN AN ORGANIZED HEALTH CARE EDUCATION/TRAINING PROGRAM

## 2023-10-12 PROCEDURE — 84132 ASSAY OF SERUM POTASSIUM: CPT | Performed by: STUDENT IN AN ORGANIZED HEALTH CARE EDUCATION/TRAINING PROGRAM

## 2023-10-12 PROCEDURE — 84484 ASSAY OF TROPONIN QUANT: CPT | Performed by: STUDENT IN AN ORGANIZED HEALTH CARE EDUCATION/TRAINING PROGRAM

## 2023-10-12 ASSESSMENT — PAIN - FUNCTIONAL ASSESSMENT: PAIN_FUNCTIONAL_ASSESSMENT: 0-10

## 2023-10-12 ASSESSMENT — PAIN SCALES - GENERAL
PAINLEVEL_OUTOF10: 0 - NO PAIN
PAINLEVEL_OUTOF10: 0 - NO PAIN

## 2023-10-12 NOTE — ED NOTES
"Pt w/ AMS all day per wife, noticed something \"different\" this am. States his speech and orientation was off per EMS. CO was elevated at home when pt picked up - 25. NRB 15L placed by EMS and down to 19 when at ED. FD states CO at home was 0. Pt denies smoking.  en route. Pt did have a fall 1 hour PTA, no LOC or hitting head per EMS. Pt has rx for Eliquis but spouse states  was last time filled because of cost. Pt is oriented to  but not person or place. VSS.      Harvinder Pace RN  10/12/23 1952    "

## 2023-10-13 ENCOUNTER — APPOINTMENT (OUTPATIENT)
Dept: VASCULAR MEDICINE | Facility: HOSPITAL | Age: 83
DRG: 065 | End: 2023-10-13
Payer: MEDICARE

## 2023-10-13 ENCOUNTER — APPOINTMENT (OUTPATIENT)
Dept: CARDIOLOGY | Facility: HOSPITAL | Age: 83
DRG: 065 | End: 2023-10-13
Payer: MEDICARE

## 2023-10-13 ENCOUNTER — APPOINTMENT (OUTPATIENT)
Dept: RADIOLOGY | Facility: HOSPITAL | Age: 83
DRG: 065 | End: 2023-10-13
Payer: MEDICARE

## 2023-10-13 ENCOUNTER — PHARMACY VISIT (OUTPATIENT)
Dept: PHARMACY | Facility: CLINIC | Age: 83
End: 2023-10-13
Payer: MEDICARE

## 2023-10-13 PROBLEM — R29.90 STROKE-LIKE SYMPTOMS: Status: ACTIVE | Noted: 2023-10-13

## 2023-10-13 LAB
ALBUMIN SERPL BCP-MCNC: 3.7 G/DL (ref 3.4–5)
ALP SERPL-CCNC: 83 U/L (ref 33–136)
ALT SERPL W P-5'-P-CCNC: 16 U/L (ref 10–52)
ANION GAP SERPL CALC-SCNC: 12 MMOL/L (ref 10–20)
AST SERPL W P-5'-P-CCNC: 19 U/L (ref 9–39)
BILIRUB SERPL-MCNC: 1.5 MG/DL (ref 0–1.2)
BNP SERPL-MCNC: 293 PG/ML (ref 0–99)
BUN SERPL-MCNC: 14 MG/DL (ref 6–23)
CALCIUM SERPL-MCNC: 8.6 MG/DL (ref 8.6–10.3)
CHLORIDE SERPL-SCNC: 103 MMOL/L (ref 98–107)
CHOLEST SERPL-MCNC: 121 MG/DL (ref 0–199)
CHOLESTEROL/HDL RATIO: 2.6
CO2 SERPL-SCNC: 26 MMOL/L (ref 21–32)
CREAT SERPL-MCNC: 0.66 MG/DL (ref 0.5–1.3)
EJECTION FRACTION APICAL 4 CHAMBER: 58.4
ERYTHROCYTE [DISTWIDTH] IN BLOOD BY AUTOMATED COUNT: 13.2 % (ref 11.5–14.5)
EST. AVERAGE GLUCOSE BLD GHB EST-MCNC: 157 MG/DL
GFR SERPL CREATININE-BSD FRML MDRD: >90 ML/MIN/1.73M*2
GLUCOSE BLD MANUAL STRIP-MCNC: 130 MG/DL (ref 74–99)
GLUCOSE BLD MANUAL STRIP-MCNC: 130 MG/DL (ref 74–99)
GLUCOSE BLD MANUAL STRIP-MCNC: 141 MG/DL (ref 74–99)
GLUCOSE BLD MANUAL STRIP-MCNC: 147 MG/DL (ref 74–99)
GLUCOSE SERPL-MCNC: 143 MG/DL (ref 74–99)
HBA1C MFR BLD: 7.1 %
HCT VFR BLD AUTO: 40.6 % (ref 41–52)
HDLC SERPL-MCNC: 46.8 MG/DL
HGB BLD-MCNC: 13.7 G/DL (ref 13.5–17.5)
MCH RBC QN AUTO: 31.6 PG (ref 26–34)
MCHC RBC AUTO-ENTMCNC: 33.7 G/DL (ref 32–36)
MCV RBC AUTO: 94 FL (ref 80–100)
NON-HDL CHOLESTEROL: 74 MG/DL (ref 0–149)
NRBC BLD-RTO: 0 /100 WBCS (ref 0–0)
PLATELET # BLD AUTO: 202 X10*3/UL (ref 150–450)
PMV BLD AUTO: 10.2 FL (ref 7.5–11.5)
POTASSIUM SERPL-SCNC: 3.4 MMOL/L (ref 3.5–5.3)
PROT SERPL-MCNC: 6.4 G/DL (ref 6.4–8.2)
RBC # BLD AUTO: 4.33 X10*6/UL (ref 4.5–5.9)
SODIUM SERPL-SCNC: 138 MMOL/L (ref 136–145)
WBC # BLD AUTO: 8.3 X10*3/UL (ref 4.4–11.3)

## 2023-10-13 PROCEDURE — 84075 ASSAY ALKALINE PHOSPHATASE: CPT

## 2023-10-13 PROCEDURE — 83036 HEMOGLOBIN GLYCOSYLATED A1C: CPT | Mod: CMCLAB,GEALAB

## 2023-10-13 PROCEDURE — 70551 MRI BRAIN STEM W/O DYE: CPT | Mod: MG

## 2023-10-13 PROCEDURE — 97165 OT EVAL LOW COMPLEX 30 MIN: CPT | Mod: GO

## 2023-10-13 PROCEDURE — 2500000001 HC RX 250 WO HCPCS SELF ADMINISTERED DRUGS (ALT 637 FOR MEDICARE OP)

## 2023-10-13 PROCEDURE — 70547 MR ANGIOGRAPHY NECK W/O DYE: CPT | Performed by: RADIOLOGY

## 2023-10-13 PROCEDURE — 99222 1ST HOSP IP/OBS MODERATE 55: CPT | Performed by: PSYCHIATRY & NEUROLOGY

## 2023-10-13 PROCEDURE — 82947 ASSAY GLUCOSE BLOOD QUANT: CPT

## 2023-10-13 PROCEDURE — 92610 EVALUATE SWALLOWING FUNCTION: CPT | Mod: GN | Performed by: SPEECH-LANGUAGE PATHOLOGIST

## 2023-10-13 PROCEDURE — 93306 TTE W/DOPPLER COMPLETE: CPT | Performed by: STUDENT IN AN ORGANIZED HEALTH CARE EDUCATION/TRAINING PROGRAM

## 2023-10-13 PROCEDURE — 93306 TTE W/DOPPLER COMPLETE: CPT

## 2023-10-13 PROCEDURE — 99223 1ST HOSP IP/OBS HIGH 75: CPT

## 2023-10-13 PROCEDURE — 36415 COLL VENOUS BLD VENIPUNCTURE: CPT

## 2023-10-13 PROCEDURE — 70551 MRI BRAIN STEM W/O DYE: CPT | Performed by: RADIOLOGY

## 2023-10-13 PROCEDURE — 93970 EXTREMITY STUDY: CPT | Performed by: INTERNAL MEDICINE

## 2023-10-13 PROCEDURE — 70544 MR ANGIOGRAPHY HEAD W/O DYE: CPT | Mod: MG

## 2023-10-13 PROCEDURE — 70547 MR ANGIOGRAPHY NECK W/O DYE: CPT | Mod: MG

## 2023-10-13 PROCEDURE — 85027 COMPLETE CBC AUTOMATED: CPT

## 2023-10-13 PROCEDURE — 93970 EXTREMITY STUDY: CPT

## 2023-10-13 PROCEDURE — 70544 MR ANGIOGRAPHY HEAD W/O DYE: CPT | Performed by: RADIOLOGY

## 2023-10-13 PROCEDURE — 97161 PT EVAL LOW COMPLEX 20 MIN: CPT | Mod: GP

## 2023-10-13 PROCEDURE — 83880 ASSAY OF NATRIURETIC PEPTIDE: CPT

## 2023-10-13 PROCEDURE — 2500000004 HC RX 250 GENERAL PHARMACY W/ HCPCS (ALT 636 FOR OP/ED)

## 2023-10-13 PROCEDURE — 1200000002 HC GENERAL ROOM WITH TELEMETRY DAILY

## 2023-10-13 RX ORDER — DEXTROSE 50 % IN WATER (D50W) INTRAVENOUS SYRINGE
25
Status: DISCONTINUED | OUTPATIENT
Start: 2023-10-13 | End: 2023-10-14 | Stop reason: HOSPADM

## 2023-10-13 RX ORDER — INSULIN LISPRO 100 [IU]/ML
0-10 INJECTION, SOLUTION INTRAVENOUS; SUBCUTANEOUS
Status: DISCONTINUED | OUTPATIENT
Start: 2023-10-13 | End: 2023-10-14 | Stop reason: HOSPADM

## 2023-10-13 RX ORDER — HYDRALAZINE HYDROCHLORIDE 25 MG/1
25 TABLET, FILM COATED ORAL EVERY 6 HOURS PRN
Status: DISCONTINUED | OUTPATIENT
Start: 2023-10-13 | End: 2023-10-14 | Stop reason: HOSPADM

## 2023-10-13 RX ORDER — ASPIRIN 300 MG/1
300 SUPPOSITORY RECTAL ONCE
Status: COMPLETED | OUTPATIENT
Start: 2023-10-13 | End: 2023-10-13

## 2023-10-13 RX ORDER — ATORVASTATIN CALCIUM 80 MG/1
80 TABLET, FILM COATED ORAL NIGHTLY
Status: DISCONTINUED | OUTPATIENT
Start: 2023-10-13 | End: 2023-10-14 | Stop reason: HOSPADM

## 2023-10-13 RX ORDER — CLOPIDOGREL BISULFATE 75 MG/1
75 TABLET ORAL DAILY
Status: DISCONTINUED | OUTPATIENT
Start: 2023-10-14 | End: 2023-10-14 | Stop reason: HOSPADM

## 2023-10-13 RX ORDER — ASPIRIN 81 MG/1
81 TABLET ORAL DAILY
Status: DISCONTINUED | OUTPATIENT
Start: 2023-10-14 | End: 2023-10-14 | Stop reason: HOSPADM

## 2023-10-13 RX ORDER — LABETALOL HYDROCHLORIDE 5 MG/ML
10 INJECTION, SOLUTION INTRAVENOUS EVERY 10 MIN PRN
Status: DISCONTINUED | OUTPATIENT
Start: 2023-10-13 | End: 2023-10-14 | Stop reason: HOSPADM

## 2023-10-13 RX ORDER — HEPARIN SODIUM 5000 [USP'U]/ML
5000 INJECTION, SOLUTION INTRAVENOUS; SUBCUTANEOUS EVERY 8 HOURS
Status: DISCONTINUED | OUTPATIENT
Start: 2023-10-14 | End: 2023-10-14 | Stop reason: HOSPADM

## 2023-10-13 RX ORDER — CLOPIDOGREL BISULFATE 75 MG/1
300 TABLET ORAL ONCE
Status: COMPLETED | OUTPATIENT
Start: 2023-10-13 | End: 2023-10-13

## 2023-10-13 RX ORDER — NAPROXEN SODIUM 220 MG/1
324 TABLET, FILM COATED ORAL ONCE
Status: COMPLETED | OUTPATIENT
Start: 2023-10-13 | End: 2023-10-13

## 2023-10-13 RX ORDER — SENNOSIDES 8.6 MG/1
2 TABLET ORAL 2 TIMES DAILY
Status: DISCONTINUED | OUTPATIENT
Start: 2023-10-13 | End: 2023-10-13

## 2023-10-13 RX ORDER — SENNOSIDES 8.6 MG/1
2 TABLET ORAL 2 TIMES DAILY
Status: DISCONTINUED | OUTPATIENT
Start: 2023-10-13 | End: 2023-10-14 | Stop reason: HOSPADM

## 2023-10-13 RX ORDER — ASPIRIN 300 MG/1
300 SUPPOSITORY RECTAL DAILY
Status: DISCONTINUED | OUTPATIENT
Start: 2023-10-14 | End: 2023-10-14 | Stop reason: HOSPADM

## 2023-10-13 RX ORDER — HYDRALAZINE HYDROCHLORIDE 20 MG/ML
10 INJECTION INTRAMUSCULAR; INTRAVENOUS EVERY 20 MIN
Status: ACTIVE | OUTPATIENT
Start: 2023-10-13 | End: 2023-10-13

## 2023-10-13 RX ORDER — NAPROXEN SODIUM 220 MG/1
81 TABLET, FILM COATED ORAL DAILY
Status: DISCONTINUED | OUTPATIENT
Start: 2023-10-14 | End: 2023-10-14 | Stop reason: HOSPADM

## 2023-10-13 RX ORDER — POTASSIUM CHLORIDE 14.9 MG/ML
20 INJECTION INTRAVENOUS ONCE
Status: COMPLETED | OUTPATIENT
Start: 2023-10-13 | End: 2023-10-13

## 2023-10-13 RX ORDER — DEXTROSE MONOHYDRATE 100 MG/ML
0.3 INJECTION, SOLUTION INTRAVENOUS ONCE AS NEEDED
Status: DISCONTINUED | OUTPATIENT
Start: 2023-10-13 | End: 2023-10-14 | Stop reason: HOSPADM

## 2023-10-13 RX ORDER — ASPIRIN 325 MG
325 TABLET ORAL ONCE
Status: COMPLETED | OUTPATIENT
Start: 2023-10-13 | End: 2023-10-13

## 2023-10-13 RX ADMIN — CLOPIDOGREL BISULFATE 300 MG: 75 TABLET ORAL at 02:33

## 2023-10-13 RX ADMIN — ASPIRIN 325 MG: 325 TABLET ORAL at 02:34

## 2023-10-13 RX ADMIN — ATORVASTATIN CALCIUM 80 MG: 80 TABLET, FILM COATED ORAL at 02:34

## 2023-10-13 RX ADMIN — ATORVASTATIN CALCIUM 80 MG: 80 TABLET, FILM COATED ORAL at 20:27

## 2023-10-13 RX ADMIN — POTASSIUM CHLORIDE 20 MEQ: 14.9 INJECTION, SOLUTION INTRAVENOUS at 11:42

## 2023-10-13 SDOH — SOCIAL STABILITY: SOCIAL INSECURITY: ARE THERE ANY APPARENT SIGNS OF INJURIES/BEHAVIORS THAT COULD BE RELATED TO ABUSE/NEGLECT?: NO

## 2023-10-13 SDOH — SOCIAL STABILITY: SOCIAL INSECURITY: HAS ANYONE EVER THREATENED TO HURT YOUR FAMILY OR YOUR PETS?: NO

## 2023-10-13 SDOH — SOCIAL STABILITY: SOCIAL INSECURITY: ABUSE: ADULT

## 2023-10-13 SDOH — SOCIAL STABILITY: SOCIAL INSECURITY: ARE YOU OR HAVE YOU BEEN THREATENED OR ABUSED PHYSICALLY, EMOTIONALLY, OR SEXUALLY BY ANYONE?: NO

## 2023-10-13 SDOH — SOCIAL STABILITY: SOCIAL INSECURITY: WERE YOU ABLE TO COMPLETE ALL THE BEHAVIORAL HEALTH SCREENINGS?: YES

## 2023-10-13 SDOH — SOCIAL STABILITY: SOCIAL INSECURITY: DO YOU FEEL UNSAFE GOING BACK TO THE PLACE WHERE YOU ARE LIVING?: NO

## 2023-10-13 SDOH — SOCIAL STABILITY: SOCIAL INSECURITY: DO YOU FEEL ANYONE HAS EXPLOITED OR TAKEN ADVANTAGE OF YOU FINANCIALLY OR OF YOUR PERSONAL PROPERTY?: NO

## 2023-10-13 SDOH — SOCIAL STABILITY: SOCIAL INSECURITY: DOES ANYONE TRY TO KEEP YOU FROM HAVING/CONTACTING OTHER FRIENDS OR DOING THINGS OUTSIDE YOUR HOME?: NO

## 2023-10-13 SDOH — SOCIAL STABILITY: SOCIAL INSECURITY: HAVE YOU HAD THOUGHTS OF HARMING ANYONE ELSE?: NO

## 2023-10-13 ASSESSMENT — ENCOUNTER SYMPTOMS
CONSTITUTIONAL NEGATIVE: 1
WEAKNESS: 1
TREMORS: 0
GASTROINTESTINAL NEGATIVE: 1
LIGHT-HEADEDNESS: 0
FACIAL ASYMMETRY: 1
HEMATOLOGIC/LYMPHATIC NEGATIVE: 1
DIZZINESS: 0
MUSCULOSKELETAL NEGATIVE: 1
SPEECH DIFFICULTY: 1
SEIZURES: 0
TROUBLE SWALLOWING: 1
RESPIRATORY NEGATIVE: 1
PSYCHIATRIC NEGATIVE: 1
NUMBNESS: 0
HEADACHES: 1

## 2023-10-13 ASSESSMENT — ACTIVITIES OF DAILY LIVING (ADL)
FEEDING YOURSELF: INDEPENDENT
GROOMING: NEEDS ASSISTANCE
LACK_OF_TRANSPORTATION: NO
ADL_ASSISTANCE: INDEPENDENT
JUDGMENT_ADEQUATE_SAFELY_COMPLETE_DAILY_ACTIVITIES: NO
ADEQUATE_TO_COMPLETE_ADL: YES
ADL_ASSISTANCE: INDEPENDENT
TOILETING: NEEDS ASSISTANCE
HEARING - RIGHT EAR: DIFFICULTY WITH NOISE
PATIENT'S MEMORY ADEQUATE TO SAFELY COMPLETE DAILY ACTIVITIES?: NO
DRESSING YOURSELF: NEEDS ASSISTANCE
LACK_OF_TRANSPORTATION: NO
BATHING: NEEDS ASSISTANCE
HEARING - LEFT EAR: DIFFICULTY WITH NOISE
WALKS IN HOME: NEEDS ASSISTANCE

## 2023-10-13 ASSESSMENT — PATIENT HEALTH QUESTIONNAIRE - PHQ9
SUM OF ALL RESPONSES TO PHQ9 QUESTIONS 1 & 2: 0
2. FEELING DOWN, DEPRESSED OR HOPELESS: NOT AT ALL
1. LITTLE INTEREST OR PLEASURE IN DOING THINGS: NOT AT ALL

## 2023-10-13 ASSESSMENT — COGNITIVE AND FUNCTIONAL STATUS - GENERAL
TOILETING: A LITTLE
TURNING FROM BACK TO SIDE WHILE IN FLAT BAD: A LITTLE
MOVING TO AND FROM BED TO CHAIR: A LITTLE
HELP NEEDED FOR BATHING: A LITTLE
MOBILITY SCORE: 18
DAILY ACTIVITIY SCORE: 19
PERSONAL GROOMING: A LITTLE
TOILETING: A LITTLE
MOVING TO AND FROM BED TO CHAIR: A LOT
DRESSING REGULAR UPPER BODY CLOTHING: A LITTLE
STANDING UP FROM CHAIR USING ARMS: A LITTLE
HELP NEEDED FOR BATHING: A LITTLE
DRESSING REGULAR LOWER BODY CLOTHING: A LITTLE
DRESSING REGULAR LOWER BODY CLOTHING: A LITTLE
DAILY ACTIVITIY SCORE: 21
HELP NEEDED FOR BATHING: A LITTLE
MOBILITY SCORE: 14
WALKING IN HOSPITAL ROOM: A LOT
WALKING IN HOSPITAL ROOM: A LOT
MOVING FROM LYING ON BACK TO SITTING ON SIDE OF FLAT BED WITH BEDRAILS: A LITTLE
MOVING TO AND FROM BED TO CHAIR: A LITTLE
CLIMB 3 TO 5 STEPS WITH RAILING: A LITTLE
PATIENT BASELINE BEDBOUND: NO
TOILETING: A LITTLE
MOBILITY SCORE: 20
CLIMB 3 TO 5 STEPS WITH RAILING: A LOT
WALKING IN HOSPITAL ROOM: A LITTLE
DAILY ACTIVITIY SCORE: 21
STANDING UP FROM CHAIR USING ARMS: A LOT
DRESSING REGULAR LOWER BODY CLOTHING: A LITTLE
CLIMB 3 TO 5 STEPS WITH RAILING: A LOT
STANDING UP FROM CHAIR USING ARMS: A LITTLE

## 2023-10-13 ASSESSMENT — PAIN SCALES - GENERAL
PAINLEVEL_OUTOF10: 0 - NO PAIN

## 2023-10-13 ASSESSMENT — LIFESTYLE VARIABLES
HOW OFTEN DO YOU HAVE 6 OR MORE DRINKS ON ONE OCCASION: NEVER
SUBSTANCE_ABUSE_PAST_12_MONTHS: NO
PRESCIPTION_ABUSE_PAST_12_MONTHS: NO
HOW OFTEN DO YOU HAVE A DRINK CONTAINING ALCOHOL: NEVER
AUDIT-C TOTAL SCORE: 0
SKIP TO QUESTIONS 9-10: 1
HOW MANY STANDARD DRINKS CONTAINING ALCOHOL DO YOU HAVE ON A TYPICAL DAY: PATIENT DOES NOT DRINK
AUDIT-C TOTAL SCORE: 0

## 2023-10-13 ASSESSMENT — PAIN - FUNCTIONAL ASSESSMENT
PAIN_FUNCTIONAL_ASSESSMENT: 0-10
PAIN_FUNCTIONAL_ASSESSMENT: CPOT (CRITICAL CARE PAIN OBSERVATION TOOL)
PAIN_FUNCTIONAL_ASSESSMENT: 0-10

## 2023-10-13 ASSESSMENT — VISUAL ACUITY: METHOD_CF: 1

## 2023-10-13 NOTE — ED NOTES
"Pt sig other (whom he lives w/) and her daughter at bedside. They state pt is more neurologically intact than when they called EMS. They deny pt hitting head or having LOC w/ fall PTA. Pt struck right elbow, ecchymosis present but pt denies pain or TTP to area. Sig other at bedside states pt speech and balance has been \"off\" since \"about 2 days ago\" as stated by.      Harvinder Pace RN  10/13/23 0018    "

## 2023-10-13 NOTE — CONSULTS
History Of Present Illness  Johnnie Rudolph is a 83 y.o. male presenting with acute HA at 9 pm on 10/11/23.  Last known well: >24 hours before.  Had stroke symptoms resolved at time of presentation: Yes  He took Motrin, his HA improved, fell asleep about 10 pm.  In the morning his partner noticed that he was more clumsy than usual, and knocked over several glasses at breakfast (8 am).  At about 10 am his gait was off- one leg not moving well.  His parter took a nap at 2 pm, when she woke up, he had marked unilateral weakness, drooling, facial droop, aphasia, and dysarthria (3 pm).  She called an ambulance and he was brought to the ED.  I saw this patient with two stepdaughters present, and they denied he had a history of cognitive impairment previously, but during my exam he was noted to be [place] and a poor historian.  For example, he could not recall being prescribed a DOAC after persistent AF diagnosed in 2021, but the EMR indicates he has a history of mild cognitive deficits, and two prior attempts to prescribe a DOAC.  His PCP is Dr. Olvera: last seen 7/23.  Patient was stated to have mild dementia, numbness in feet, Parkinson's disease, poor candidate for warfarin- not reliable taking medications, had a fall with SDH >10 years ago, he is apparently non-compliant- not going to Podiatry for exam of feet, referred to Ophthalmology.  Past Medical History  Past Medical History:   Diagnosis Date    Body mass index (BMI) 29.0-29.9, adult 12/11/2020    Body mass index (BMI) of 29.0 to 29.9 in adult    Body mass index (BMI) 31.0-31.9, adult 04/26/2019    BMI 31.0-31.9,adult    Contact with and (suspected) exposure to potentially hazardous body fluids 04/02/2018    Exposure to blood    Disorder of male genital organs, unspecified 04/02/2018    Disorder of male genital organs    History of falling 01/16/2015    History of fall    Laceration without foreign body of unspecified part of head, initial encounter 05/15/2018     Laceration of head    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    Nontraumatic chronic subdural hemorrhage    Other cranial cerebrospinal fluid leak     Subdural hygroma    Other fecal abnormalities 05/15/2017    Occult blood positive stool    Other symptoms and signs involving cognitive functions and awareness 08/03/2015    Cognitive impairment    Pain in left shoulder 08/08/2016    Acute pain of left shoulder    Personal history of other diseases of the musculoskeletal system and connective tissue 11/04/2014    History of back pain    Postconcussional syndrome 08/03/2015    Post concussion syndrome     Surgical History  Past Surgical History:   Procedure Laterality Date    HERNIA REPAIR  06/02/2014    Hernia Repair    KNEE SURGERY  06/02/2014    Knee Surgery    OTHER SURGICAL HISTORY  03/25/2022    Inguinal hernia repair laparoscopic     Social History  Social History     Tobacco Use    Smoking status: Never    Smokeless tobacco: Never   Substance Use Topics    Alcohol use: Never    Drug use: Never     Allergies  Patient has no known allergies.  Home Medications  Facility-Administered Medications Prior to Admission   Medication Dose Route Frequency Provider Last Rate Last Admin    cyanocobalamin (Vitamin B-12) injection 1,000 mcg  1,000 mcg intramuscular q28 days Jason Olvera DO   1,000 mcg at 08/28/23 1735    cyanocobalamin (Vitamin B-12) injection 1,000 mcg  1,000 mcg intramuscular q30 days Jason Olvera DO         Medications Prior to Admission   Medication Sig Dispense Refill Last Dose    cholecalciferol (Vitamin D-3) 50 mcg (2,000 unit) capsule Take 1 capsule (50 mcg) by mouth once daily.   10/12/2023    cyanocobalamin (Vitamin B-12) 1,000 mcg/mL injection Inject 1 mL (1,000 mcg) under the skin every 30 (thirty) days.   Unknown    glipiZIDE (Glucotrol) 5 mg tablet Take 1 tablet (5 mg) by mouth 2 times a day. 180 tablet 1 10/12/2023    ketoconazole (NIZOral) 2 % cream Apply topically  once daily.   Unknown    lisinopriL-hydrochlorothiazide 20-12.5 mg tablet Take 1 tablet by mouth once daily. 90 tablet 1 10/12/2023    metFORMIN (Glucophage) 1,000 mg tablet Take 1 tablet (1,000 mg) by mouth 2 times a day with meals. 180 tablet 1 10/12/2023    metoprolol tartrate (Lopressor) 25 mg tablet Take 1 tablet (25 mg) by mouth 2 times a day. 180 tablet 1 10/12/2023    OneTouch Ultra Test strip TEST DAILY   Unknown    rivaroxaban (Xarelto) 20 mg tablet Take 1 tablet (20 mg) by mouth once daily. Take with food. (Patient not taking: Reported on 10/13/2023) 90 tablet 3 Unknown    simvastatin (Zocor) 20 mg tablet Take 1 tablet (20 mg) by mouth once daily at bedtime. 90 tablet 1 10/12/2023       Review of Systems  Neurological Exam  Mental Status  Awake. Speech is normal. Language is fluent with no aphasia. Attention and concentration are normal.  Oriented to month, not to date or year, nor place..    Cranial Nerves  CN II: Right visual acuity: Counts fingers. Left visual acuity: Counts fingers. Visual fields full to confrontation.  CN V: Facial sensation is normal.  CN VII: Full and symmetric facial movement.  CN VIII: Hearing is normal.  CN IX, X: Palate elevates symmetrically  CN XI: Shoulder shrug strength is normal.  CN XII: Tongue midline without atrophy or fasciculations.    Motor  Normal muscle bulk throughout. No fasciculations present. Normal muscle tone. No abnormal involuntary movements. Strength is 5/5 in all four extremities except as noted.  Hypomimia, no tremor, no bradykinesia, right foot is deviated slightly in a plantar direction.  Right 5th finger has a contracture..    Sensory  Sensation is intact to light touch, pinprick, vibration and proprioception in all four extremities.    Reflexes                                            Right                      Left  Brachioradialis                    1+                         1+  Biceps                                 1+                          "1+  Triceps                                1+                         1+  Patellar                                0                         0  Achilles                                0                         0  Right Plantar: downgoing  Left Plantar: downgoing    Coordination  Right: Finger-to-nose normal.Left: Finger-to-nose normal.    Gait  Casual gait:  Patient could barely stand with assistance..    Physical Exam  Constitutional:       General: He is awake.   Neurological:      Deep Tendon Reflexes:      Reflex Scores:       Tricep reflexes are 1+ on the right side and 1+ on the left side.       Bicep reflexes are 1+ on the right side and 1+ on the left side.       Brachioradialis reflexes are 1+ on the right side and 1+ on the left side.       Patellar reflexes are 0 on the right side and 0 on the left side.       Achilles reflexes are 0 on the right side and 0 on the left side.  Psychiatric:         Speech: Speech normal.       Last Recorded Vitals  Blood pressure 157/83, pulse 82, temperature 36.3 °C (97.3 °F), resp. rate 16, height 1.854 m (6' 1\"), weight 97.8 kg (215 lb 9.8 oz), SpO2 94 %.        Relevant Results      NIH Stroke Scale  1A. Level of Consciousness: Alert, Keenly Responsive  1B. Ask Month and Age: Both Questions Right  1C. Blink Eyes & Squeeze Hands: Performs Both Tasks  2. Best Gaze: Normal  3. Visual: No Visual Loss  4. Facial Palsy: Minor Paralysis  5A. Motor - Left Arm: No Drift  5B. Motor - Right Arm: No Drift  6A. Motor - Left Leg: Drift  6B. Motor - Right Leg: No Drift  7. Limb Ataxia: Absent  8. Sensory Loss: Normal  9. Best Language: Mild-to-Moderate Aphasia  10. Dysarthria: Mild-to-Moderate Dysarthria  11. Extinction and Inattention: No Abnormality  NIH Stroke Scale: 4           Ambler Coma Scale  Best Eye Response: Spontaneous  Best Verbal Response: Confused  Best Motor Response: Follows commands  Ambler Coma Scale Score: 14                No MRI head results found for the past " 14 days  CT head wo IV contrast    Result Date: 10/12/2023  Interpreted By:  Estevan Gutiérrez, STUDY: CT HEAD WO IV CONTRAST;  10/12/2023 8:43 pm   INDICATION: Aphasia, dysarthria and facial droop greater than 24 hours since last known well.   COMPARISON: 1/27/2015   ACCESSION NUMBER(S): [id number]   ORDERING CLINICIAN: MARITN MENESES   TECHNIQUE: Contiguous axial images of the head were obtained without intravenous contrast.   FINDINGS: The examination is limited secondary to patient motion.   BRAIN PARENCHYMA:  There is cerebral atrophy and chronic periventricular white matter small vessel ischemic change. The gray white matter differentiation is preserved.  No mass effect or midline shift.   HEMORRHAGE:  No evidence of acute intracranial hemorrhage. VENTRICLES AND EXTRA-AXIAL SPACES:  The ventricles are within normal limits in size for brain volume.  No evidence of abnormal extraaxial fluid collection. EXTRACRANIAL SOFT TISSUES:  Within normal limits. PARANASAL SINUSES/MASTOIDS:  The visualized paranasal sinuses and mastoid air cells are clear and well pneumatized. CALVARIUM:  No evidence of depressed calvarial fracture.   OTHER FINDINGS:  None       Cerebral atrophy and chronic periventricular white matter small vessel ischemic change.   No evidence of acute intracranial hemorrhage.   If there is persist concern for acute ischemia or intracranial process, MRI may be obtained for further evaluation as clinically indicated.       MACRO: None   Signed by: Estevan Gutiérrez 10/12/2023 9:31 PM Dictation workstation:   [id number]   Transthoracic Echo (TTE) Complete    Result Date: 10/13/2023   [facility], [address] [place] [address]               Tel [phone] and Fax [phone] TRANSTHORACIC ECHOCARDIOGRAM REPORT  Patient Name:      BIJAN SINGH       Reading Physician:    [id number] Quinn Adams MD Study Date:        10/13/2023            Ordering Provider:    [id number] STEVEN JASON RAMIREZMagyNIRAV MRN/PID:           [id number]             Fellow: Accession#:        [id number]         Nurse:                Nga Jeffers RN Date of Birth/Age: 1940 / 83 years Sonographer:          Jasmin Santos                                                               RDCS Gender:            M                    Additional Staff: Height:            185.42 cm            Admit Date:           10/12/2023 Weight:            97.52 kg             Admission Status:     Inpatient - Time                                                               Critical BSA:               2.22 m2              Encounter#:           [id number]                                         Department Location:  [address] Blood Pressure: 174 /93 mmHg Study Type:    TRANSTHORACIC ECHO (TTE) COMPLETE Diagnosis/ICD: Other cerebral infarction-I63.89 Indication:    Stroke CPT Code:      Echo Complete w Full [id number] Patient History: Pertinent History: Aphasia, Dysarthria, Prostate ca. Study Detail: The following Echo studies were performed: 2D, M-Mode, Doppler and               color flow. Agitated saline used as a contrast agent for               intraseptal flow evaluation. Unable to obtain suprasternal notch               view. The patient was awake.  PHYSICIAN INTERPRETATION: Left Ventricle: The left ventricular systolic function is normal, with an estimated ejection fraction of 55-60%. The left ventricular cavity size is normal. Spectral Doppler shows an abnormal pattern of left ventricular diastolic filling. Left Atrium: The left atrium is upper limits of normal in size. A bubble study using agitated saline was performed. Bubble study is negative. Right Ventricle: The right ventricle is normal in size. There is normal right ventricular global systolic function. Right Atrium: The right atrium is upper limits of normal in size.  The right atrium has a prominent Chiari network. Aortic Valve: The aortic valve is trileaflet. There is mild aortic valve cusp calcification. There is no evidence of aortic valve stenosis. The aortic valve dimensionless index is 0.71. There is no evidence of aortic valve regurgitation. The peak instantaneous gradient of the aortic valve is 4.7 mmHg. The mean gradient of the aortic valve is 3.0 mmHg. Mitral Valve: The mitral valve is mild to moderately thickened. There is mild to moderate mitral annular calcification. There is trace to mild mitral valve regurgitation. Tricuspid Valve: The tricuspid valve is structurally normal. There is mild tricuspid regurgitation. The Doppler estimated RVSP is mildly elevated at 41.9 mmHg. Pulmonic Valve: The pulmonic valve is structurally normal. There is mild pulmonic valve regurgitation. Pericardium: There is no pericardial effusion noted. Aorta: The aortic root is normal. The aortic root is at the upper limits of normal size. Systemic Veins: The inferior vena cava appears dilated. There is IVC inspiratory collapse greater than 50%. In comparison to the previous echocardiogram(s): There are no prior studies on this patient for comparison purposes.  CONCLUSIONS:  1. Left ventricular systolic function is normal with a 55-60% estimated ejection fraction.  2. Spectral Doppler shows an abnormal pattern of left ventricular diastolic filling.  3. Mild tricuspid regurgitation visualized.  4. Mildly elevated RVSP. QUANTITATIVE DATA SUMMARY: 2D MEASUREMENTS:                           Normal Ranges: IVSd:          1.14 cm    (0.6-1.1cm) LVPWd:         1.16 cm    (0.6-1.1cm) LVIDd:         5.42 cm    (3.9-5.9cm) LVIDs:         4.04 cm LV Mass Index: 113.1 g/m2 LV % FS        25.5 % LA VOLUME:                               Normal Ranges: LA Vol A4C:        71.1 ml    (22+/-6mL/m2) LA Vol A2C:        70.2 ml LA Vol BP:         70.8 ml LA Vol Index A4C:  32.1ml/m2 LA Vol Index A2C:  31.6 ml/m2  LA Vol Index BP:   31.9 ml/m2 LA Area A4C:       21.8 cm2 LA Area A2C:       21.7 cm2 LA Major Axis A4C: 5.7 cm LA Major Axis A2C: 5.7 cm LA Volume Index:   30.2 ml/m2 LA Vol A4C:        66.0 ml LA Vol A2C:        67.0 ml RA VOLUME BY A/L METHOD:                       Normal Ranges: RA Area A4C: 18.8 cm2 AORTA MEASUREMENTS:                    Normal Ranges: Asc Ao, d: 3.20 cm (2.1-3.4cm) LV SYSTOLIC FUNCTION BY 2D PLANIMETRY (MOD):                     Normal Ranges: EF-A4C View: 58.4 % (>=55%) EF-A2C View: 55.9 % EF-Biplane:  56.6 % AORTIC VALVE:                                   Normal Ranges: AoV Vmax:                1.08 m/s (<=1.7m/s) AoV Peak P.7 mmHg (<20mmHg) AoV Mean PG:             3.0 mmHg (1.7-11.5mmHg) LVOT Max Anand:            0.86 m/s (<=1.1m/s) AoV VTI:                 21.00 cm (18-25cm) LVOT VTI:                15.00 cm LVOT Diameter:           2.20 cm  (1.8-2.4cm) AoV Area, VTI:           2.72 cm2 (2.5-5.5cm2) AoV Area,Vmax:           3.04 cm2 (2.5-4.5cm2) AoV Dimensionless Index: 0.71  RIGHT VENTRICLE: RV Basal 3.55 cm RV Mid   3.01 cm RV Major 8.0 cm TAPSE:   18.4 mm RV s'    0.16 m/s TRICUSPID VALVE/RVSP:                             Normal Ranges: Peak TR Velocity: 2.91 m/s RV Syst Pressure: 41.9 mmHg (< 30mmHg) IVC Diam:         2.25 cm PULMONIC VALVE:                      Normal Ranges: PV Max Anand: 0.5 m/s  (0.6-0.9m/s) PV Max P.0 mmHg  [id number] Quinn Adams MD Electronically signed on 10/13/2023 at 11:40:17 AM  ** Final **          BNP   Date/Time Value Ref Range Status   10/13/2023 06:37  (H) 0 - 99 pg/mL Final        I have personally reviewed the following imaging results Transthoracic Echo (TTE) Complete    Result Date: 10/13/2023   [facility], [address] [place] [address]               Tel [phone] and Fax [phone] TRANSTHORACIC ECHOCARDIOGRAM REPORT  Patient Name:      BIJAN SINGH       Reading Physician:    [id number] Quinn                                                                Angie MORALES Study Date:        10/13/2023           Ordering Provider:    [id number] STEVEN CASILLAS MRN/PID:           [id number]             Fellow: Accession#:        [id number]         Nurse:                Nga Jeffers RN Date of Birth/Age: 1940 / 83 years Sonographer:          Jasmin Santos                                                               RDCS Gender:            M                    Additional Staff: Height:            185.42 cm            Admit Date:           10/12/2023 Weight:            97.52 kg             Admission Status:     Inpatient - Time                                                               Critical BSA:               2.22 m2              Encounter#:           [id number]                                         Department Location:  [address] Blood Pressure: 174 /93 mmHg Study Type:    TRANSTHORACIC ECHO (TTE) COMPLETE Diagnosis/ICD: Other cerebral infarction-I63.89 Indication:    Stroke CPT Code:      Echo Complete w Full Doppler-[id number] Patient History: Pertinent History: Aphasia, Dysarthria, Prostate ca. Study Detail: The following Echo studies were performed: 2D, M-Mode, Doppler and               color flow. Agitated saline used as a contrast agent for               intraseptal flow evaluation. Unable to obtain suprasternal notch               view. The patient was awake.  PHYSICIAN INTERPRETATION: Left Ventricle: The left ventricular systolic function is normal, with an estimated ejection fraction of 55-60%. The left ventricular cavity size is normal. Spectral Doppler shows an abnormal pattern of left ventricular diastolic filling. Left Atrium: The left atrium is upper limits of normal in size. A bubble study using agitated saline was performed. Bubble study is negative. Right Ventricle: The right ventricle is normal in size. There is normal right  ventricular global systolic function. Right Atrium: The right atrium is upper limits of normal in size. The right atrium has a prominent Chiari network. Aortic Valve: The aortic valve is trileaflet. There is mild aortic valve cusp calcification. There is no evidence of aortic valve stenosis. The aortic valve dimensionless index is 0.71. There is no evidence of aortic valve regurgitation. The peak instantaneous gradient of the aortic valve is 4.7 mmHg. The mean gradient of the aortic valve is 3.0 mmHg. Mitral Valve: The mitral valve is mild to moderately thickened. There is mild to moderate mitral annular calcification. There is trace to mild mitral valve regurgitation. Tricuspid Valve: The tricuspid valve is structurally normal. There is mild tricuspid regurgitation. The Doppler estimated RVSP is mildly elevated at 41.9 mmHg. Pulmonic Valve: The pulmonic valve is structurally normal. There is mild pulmonic valve regurgitation. Pericardium: There is no pericardial effusion noted. Aorta: The aortic root is normal. The aortic root is at the upper limits of normal size. Systemic Veins: The inferior vena cava appears dilated. There is IVC inspiratory collapse greater than 50%. In comparison to the previous echocardiogram(s): There are no prior studies on this patient for comparison purposes.  CONCLUSIONS:  1. Left ventricular systolic function is normal with a 55-60% estimated ejection fraction.  2. Spectral Doppler shows an abnormal pattern of left ventricular diastolic filling.  3. Mild tricuspid regurgitation visualized.  4. Mildly elevated RVSP. QUANTITATIVE DATA SUMMARY: 2D MEASUREMENTS:                           Normal Ranges: IVSd:          1.14 cm    (0.6-1.1cm) LVPWd:         1.16 cm    (0.6-1.1cm) LVIDd:         5.42 cm    (3.9-5.9cm) LVIDs:         4.04 cm LV Mass Index: 113.1 g/m2 LV % FS        25.5 % LA VOLUME:                               Normal Ranges: LA Vol A4C:        71.1 ml    (22+/-6mL/m2) LA Vol  A2C:        70.2 ml LA Vol BP:         70.8 ml LA Vol Index A4C:  32.1ml/m2 LA Vol Index A2C:  31.6 ml/m2 LA Vol Index BP:   31.9 ml/m2 LA Area A4C:       21.8 cm2 LA Area A2C:       21.7 cm2 LA Major Axis A4C: 5.7 cm LA Major Axis A2C: 5.7 cm LA Volume Index:   30.2 ml/m2 LA Vol A4C:        66.0 ml LA Vol A2C:        67.0 ml RA VOLUME BY A/L METHOD:                       Normal Ranges: RA Area A4C: 18.8 cm2 AORTA MEASUREMENTS:                    Normal Ranges: Asc Ao, d: 3.20 cm (2.1-3.4cm) LV SYSTOLIC FUNCTION BY 2D PLANIMETRY (MOD):                     Normal Ranges: EF-A4C View: 58.4 % (>=55%) EF-A2C View: 55.9 % EF-Biplane:  56.6 % AORTIC VALVE:                                   Normal Ranges: AoV Vmax:                1.08 m/s (<=1.7m/s) AoV Peak P.7 mmHg (<20mmHg) AoV Mean PG:             3.0 mmHg (1.7-11.5mmHg) LVOT Max Anand:            0.86 m/s (<=1.1m/s) AoV VTI:                 21.00 cm (18-25cm) LVOT VTI:                15.00 cm LVOT Diameter:           2.20 cm  (1.8-2.4cm) AoV Area, VTI:           2.72 cm2 (2.5-5.5cm2) AoV Area,Vmax:           3.04 cm2 (2.5-4.5cm2) AoV Dimensionless Index: 0.71  RIGHT VENTRICLE: RV Basal 3.55 cm RV Mid   3.01 cm RV Major 8.0 cm TAPSE:   18.4 mm RV s'    0.16 m/s TRICUSPID VALVE/RVSP:                             Normal Ranges: Peak TR Velocity: 2.91 m/s RV Syst Pressure: 41.9 mmHg (< 30mmHg) IVC Diam:         2.25 cm PULMONIC VALVE:                      Normal Ranges: PV Max Anand: 0.5 m/s  (0.6-0.9m/s) PV Max P.0 mmHg  [id number] Quinn Adams MD Electronically signed on 10/13/2023 at 11:40:17 AM  ** Final **     Lower extremity venous duplex bilateral    Result Date: 10/13/2023          [place] [address]  Tel [phone] and Fax [phone]  Vascular Lab Report Lower Venous Duplex Ultrasound  Patient Name:      BIJAN Whitmore Physician:  [id number] Paramjit Johnson                                                              MD, PADMA Study Date:        10/13/2023           Ordering Provider:  [id number] GORGE SOCRATES CORINA MRN/PID:           [id number]             Technologist:       Brandy Nolan [facility] Accession#:        [id number]         Technologist 2: Date of Birth/Age: 1940 / 83 years Encounter#:         [id number] Gender:            M Admission Status:  Inpatient            Location Performed: [facility]  Diagnosis/ICD: Localized (leg) edema-R60.0  CONCLUSIONS: Right Lower Venous: No evidence of acute deep vein thrombus visualized in the right lower extremity. Left Lower Venous: No evidence of acute deep vein thrombus visualized in the left lower extremity.  Comparison: Compared with study from 4/20/2020, Onlyl LLe previous exam, no change.  Imaging & Doppler Findings:  Right                 Compressible Thrombus        Flow Distal External Iliac     Yes        None   Spontaneous/Phasic CFV                       Yes        None   Spontaneous/Phasic PFV                       Yes        None FV Proximal               Yes        None   Spontaneous/Phasic FV Mid                    Yes        None FV Distal                 Yes        None Popliteal                 Yes        None   Spontaneous/Phasic Peroneal                  Yes        None PTV                       Yes        None  Left                  Compress Thrombus        Flow Distal External Iliac   Yes      None   Spontaneous/Phasic CFV                     Yes      None   Spontaneous/Phasic PFV                     Yes      None FV Proximal             Yes      None   Spontaneous/Phasic FV Mid                  Yes      None FV Distal               Yes      None Popliteal               Yes      None   Spontaneous/Phasic Peroneal                Yes      None PTV                     Yes      None  [id number] Paramjit Johnson MD, PADMA Electronically signed by [id number] Paramjit Johnson MD, PADMA on 10/13/2023 at 10:18:59 AM  ** Final **     CT head wo IV  contrast    Result Date: 10/12/2023  Interpreted By:  Estevan Gutiérrez, STUDY: CT HEAD WO IV CONTRAST;  10/12/2023 8:43 pm   INDICATION: Aphasia, dysarthria and facial droop greater than 24 hours since last known well.   COMPARISON: 1/27/2015   ACCESSION NUMBER(S): [id number]   ORDERING CLINICIAN: MARTIN MENESES   TECHNIQUE: Contiguous axial images of the head were obtained without intravenous contrast.   FINDINGS: The examination is limited secondary to patient motion.   BRAIN PARENCHYMA:  There is cerebral atrophy and chronic periventricular white matter small vessel ischemic change. The gray white matter differentiation is preserved.  No mass effect or midline shift.   HEMORRHAGE:  No evidence of acute intracranial hemorrhage. VENTRICLES AND EXTRA-AXIAL SPACES:  The ventricles are within normal limits in size for brain volume.  No evidence of abnormal extraaxial fluid collection. EXTRACRANIAL SOFT TISSUES:  Within normal limits. PARANASAL SINUSES/MASTOIDS:  The visualized paranasal sinuses and mastoid air cells are clear and well pneumatized. CALVARIUM:  No evidence of depressed calvarial fracture.   OTHER FINDINGS:  None       Cerebral atrophy and chronic periventricular white matter small vessel ischemic change.   No evidence of acute intracranial hemorrhage.   If there is persist concern for acute ischemia or intracranial process, MRI may be obtained for further evaluation as clinically indicated.       MACRO: None   Signed by: Estevan Gutiérrez 10/12/2023 9:31 PM Dictation workstation:   [id number]  . Reviewed with patient's family.    Stroke Alert CT/MRI review:   IV Thrombolysis IV Thrombolysis Checklist      IV Thrombolysis Given: No; Thrombolysis contraindication reason: Time from Last Known Well (or stroke onset) is >4.5 hours        Assessment/Plan   Principal Problem:    Stroke-like symptoms  This patient has evidence of mild dementia, parkinsonism, has several stroke risk factors, including AF, HTN, DM,  and hyperlipidemia.  He has been non-compliant taking medications and not a good candidate for anticoagulation (financial for a DOAC, and non-compliant for warfarin).  His family was given information about increased risk of stroke, as has Dr. Olvera in prior appointment this year.  Will follow up on MRI results: he may have had a TIA.    Type: Ischemic stroke  Subtype/etiology: unclear  Vessels involved: unclear  Neurological manifestations:  NIHSS (worst at presentation): unclear   Diagnostic evaluation: As ordered  Antiplatelet/antithrombotic plan for stroke prevention: aspirin 81 mg and clopidogrel 75 mg daily  VTE prophylaxis: as ordered  Vascular Risk Factor modification goals:  Blood pressure goals: avoid hypotension SBP <100 that could worsen cerebral perfusion, Ischemic stroke- early permissive hypertension SBP < 220 mmHg with cautious inpatient lowering  Lipid Goals: education on healthy diet and statin therapy to maintain or achieve goal LDL-cholesterol < 70mg  Glucose Goals: early treatment of hyperglycemia to goal glucose 140-180 mg/dl with long-term goal A1c < 7%   Smoking Cessation and Education  Assessment for Rehabilitation needs   Patient and family education on signs and symptoms of stroke, calling 911, healthy strategies for stroke prevention.         I spent 60 minutes in the professional and overall care of this patient.  Follow up in am.      Luis Schrader MD

## 2023-10-13 NOTE — PROGRESS NOTES
Speech-Language Pathology    Inpatient Speech-Language Pathology Clinical Swallow Evaluation    Patient Name: Johnnie Rudolph  MRN: [id number]  Today's Date: 10/13/2023             Current Problem:   Patient Active Problem List   Diagnosis    Abnormal weight loss    Actinic keratoses    Benign essential hypertension    Cataracts, bilateral    Disorder of joint of pelvic region and thigh    Elevated low density lipoprotein (LDL) cholesterol level    Epidermal cyst    Glaucoma    Hypomagnesemia    Inguinal hernia, left    Leg pain, left    Leukocytosis    Lower leg edema    Malignant neoplasm of prostate (CMS/HCC)    Memory dysfunction    Poor memory    Parkinson's disease    Persistent atrial fibrillation (CMS/HCC)    Polyneuropathy, diabetic (CMS/HCC)    Tinea pedis of both feet    Type 2 diabetes mellitus with hyperglycemia, without long-term current use of insulin (CMS/HCC)    Ulnar nerve palsy    Vitamin B12 deficiency    Vitamin D deficiency    Varicose vein of leg    Squamous cell carcinoma of nose    Basal cell carcinoma of right side of nose    Cat scratch of right lower leg    Stroke-like symptoms         Recommendations:  Solid Diet Recommendations : Regular (IDDSI Level 7)  Compensatory Swallowing Strategies: Upright 90 degrees as possible for all oral intake      Assessment:  Pt tolerated all items from dinner tray including chicken tenders, macaroni and cheese with thin liquids.  Pt consumed 3 ounces water via straw without change in voice quality, no cough or throat clear.          Plan:  SLP Plan: No skilled SLP      Subjective   Current Problem:  Pt is awaiting MRI results for stroke like symptoms.  Pt and family indicate slurred speech and word finding from yesterday evening have resolved and Pt has returned to baseline function.      General Visit Information:  Current Diet : regular solids and thin liquids      Vital Signs:         Objective       Baseline Assessment:  Volitional Swallow: Within  Functional Limits      Pain:  Pain Assessment: CPOT (Critical Care Pain Observation Tool)  Pain Score: 0 - No pain       Oral/Motor Assessment:  No significant deficts exhibited.               Clinical Observations:  Pt within functional limits for swallowing regular solids and thin liquids during bedside swallow evaluation.                      Outpatient Education:  Adult Outpatient Education  Patient Response to Education: Patient/Caregiver Verbalized Understanding of Information  Education Comment: Pt and family demonstrated comprehension of s/s penentration or aspiration.

## 2023-10-13 NOTE — PROGRESS NOTES
Physical Therapy    Physical Therapy Evaluation    Patient Name: Johnnie Rudolph  MRN: [id number]  Today's Date: 10/13/2023   Time Calculation  Start Time: 0839  Stop Time: 0856  Time Calculation (min): 17 min    Assessment/Plan   PT Assessment  PT Assessment Results: Decreased strength, Decreased endurance, Decreased mobility  Rehab Prognosis: Excellent  Evaluation/Treatment Tolerance: Patient tolerated treatment well  Medical Staff Made Aware: Yes  Strengths: Housing layout, Support of Caregivers  End of Session Communication: Bedside nurse, Physician (Resident present for aspects of assessment)  End of Session Patient Position: Up in chair, Alarm on, all needs within reach   IP OR SWING BED PT PLAN  Inpatient or Swing Bed: Inpatient  PT Plan  Treatment/Interventions: Transfer training, Gait training, Balance training, Endurance training  PT Plan: Skilled PT  PT Frequency: 2 times per week  PT Discharge Recommendations: Low intensity level of continued care  Equipment Recommended upon Discharge: Wheeled walker  PT Recommended Transfer Status: Assist x1      Subjective   General Visit Information:  General  Reason for Referral: r/o ischemic stroke  Referred By: Keny Saini  Past Medical History Relevant to Rehab: HLD, HTN, afib  Family/Caregiver Present: No  Prior to Session Communication: Bedside nurse  Patient Position Received: Bed, 3 rail up  Preferred Learning Style: verbal  Home Living:  Home Living  Type of Home: House  Lives With: Significant other  Home Adaptive Equipment: None  Home Layout: One level  Home Access: No concerns (Patient reports no stairs to enter home)  Bathroom Shower/Tub: Walk-in shower  Prior Level of Function:  Prior Function Per Pt/Caregiver Report  Level of [place]: Independent with ADLs and functional transfers  ADL Assistance: Independent  Homemaking Assistance: Independent  Ambulatory Assistance: Independent  Prior Function Comments:  ((+)  driving)  Precautions:  Precautions  Medical Precautions: Fall precautions (external cath, tele)  Vital Signs:       Objective   Pain:  Pain Assessment  Pain Assessment: 0-10  Pain Score: 0 - No pain  Cognition:  Cognition  Overall Cognitive Status:  (AO x 4, demonstrates mild difficulty following instructions)    General Assessments:      Sensation  Light Touch: No apparent deficits    Strength  Strength Comments: B LE 4/5    Coordination  Finger to Nose: Intact  Heel to Shin: Intact (B without visual input)    Postural Control  Postural Control: Within Functional Limits    Static Sitting Balance  Static Sitting-Balance Support: No upper extremity supported  Static Sitting-Level of Assistance: Independent    Static Standing Balance  Static Standing-Balance Support: Bilateral upper extremity supported  Static Standing-Level of Assistance: Close supervision  Functional Assessments:  Bed Mobility  Bed Mobility: Yes  Bed Mobility 1  Bed Mobility 1: Supine to sitting  Level of Assistance 1: Independent    Transfers  Transfer: Yes  Transfer 1  Transfer From 1: Sit to  Transfer to 1: Stand  Transfer Device 1: Walker  Transfer Level of Assistance 1: Close supervision  Transfers 2  Transfer From 2: Stand to  Transfer to 2: Sit  Transfer Device 2: Walker  Transfer Level of Assistance 2: Close supervision    Ambulation/Gait Training  Ambulation/Gait Training Performed: Yes  Ambulation/Gait Training 1  Surface 1: Level tile  Device 1: Rolling walker  Assistance 1: Close supervision  Comments/Distance (ft) 1: 30'    Outcome Measures:  [facility] Basic Mobility  Turning from your back to your side while in a flat bed without using bedrails: None  Moving from lying on your back to sitting on the side of a flat bed without using bedrails: None  Moving to and from bed to chair (including a wheelchair): A little  Standing up from a chair using your arms (e.g. wheelchair or bedside chair): A little  To walk in hospital room: A  little  Climbing 3-5 steps with railing: A little  Basic Mobility - Total Score: 20    Encounter Problems       Encounter Problems (Active)       Balance       STG - Maintains dynamic standing balance without upper extremity support with dual task x 5'  (Progressing)       Start:  10/13/23    Expected End:  10/27/23       INTERVENTIONS:  1. Practice standing with minimal support.  2. Educate patient about standing tolerance.  3. Educate patient about independence with gait, transfers, and ADL's.  4. Educate patient about use of assistive device.  5. Educate patient about self-directed care.            Mobility       STG - Patient will ambulate 150' without device  (Progressing)       Start:  10/13/23    Expected End:  10/27/23               Transfers       STG - Patient will transfer sit to and from stand independently  (Progressing)       Start:  10/13/23    Expected End:  10/27/23                   Education Documentation  Precautions, taught by Dottie Martin PT at 10/13/2023  9:23 AM.  Learner: Patient  Readiness: Acceptance  Method: Explanation  Response: Verbalizes Understanding    Body Mechanics, taught by Dottie Martin PT at 10/13/2023  9:23 AM.  Learner: Patient  Readiness: Acceptance  Method: Explanation  Response: Verbalizes Understanding    Mobility Training, taught by Dottie Martin PT at 10/13/2023  9:23 AM.  Learner: Patient  Readiness: Acceptance  Method: Explanation  Response: Verbalizes Understanding    Education Comments  No comments found.

## 2023-10-13 NOTE — CARE PLAN
Problem: General Stroke  Goal: Demonstrate improvement in neurological exam throughout the shift  Outcome: Progressing  Goal: Maintain BP within ordered limits throughout shift  Outcome: Progressing  Goal: Participate in treatment (ie., meds, therapy) throughout shift  Outcome: Progressing  Goal: No symptoms of aspiration throughout shift  Outcome: Progressing   The patient's goals for the shift include      The clinical goals for the shift include i don't know    Over the shift, the patient did not make progress toward the following goals. Barriers to progression include. Recommendations to address these barriers include.

## 2023-10-13 NOTE — CARE PLAN
Problem: Balance  Goal: STG - Maintains dynamic standing balance without upper extremity support with dual task x 5'   Description: INTERVENTIONS:  1. Practice standing with minimal support.  2. Educate patient about standing tolerance.  3. Educate patient about independence with gait, transfers, and ADL's.  4. Educate patient about use of assistive device.  5. Educate patient about self-directed care.  Outcome: Progressing     Problem: Mobility  Goal: STG - Patient will ambulate 150' without device   Outcome: Progressing     Problem: Transfers  Goal: STG - Patient will transfer sit to and from stand independently   Outcome: Progressing

## 2023-10-13 NOTE — H&P
" Subjective     HPI    HPI: Johnnie Rudolph is a 83 y.o. male who presented to the hospital for stroke-like symptoms. He has a past medical history of HTN, HLD, atrial fibrillation question anticoagulation, HTN, DM2 not on insulin at home, and mild age related cognitive deficits. He was in his usual state of health until he developed a headache at approximately 2100 on 10/11. It was improved with motrin, and the patient fell asleep at approximately 2200. In the morning, his wife noticed that he was clumsier than usual and knocked over several glasses at breakfast around 0800. At 1000, she noticed that he had an odd gait, describing one of his legs not moving well. His wife took a nap at 1400, and when she woke up at 1500, she noticed that he had a marked unilateral facial droop with drooling, aphasia, and dyarthria. At that time, she called an ambulance. Of note, patient was prescribed a DOAC after his atrial fibrillation was diagnosed in 2021, but he was unable to take it due to the prohibitive cost. He was prescribed rivaroxaban in 07/23, but it is unclear if he has been taking it.    ED Course  Vitals - Blood pressure 156/79, pulse 91, temperature 36.5 °C (97.7 °F), temperature source Temporal, resp. rate 17, height 1.854 m (6' 1\"), weight 102 kg (225 lb 15.5 oz), SpO2 97 %.  Labs -   Lab Results   Component Value Date    WBC 7.2 10/12/2023    HGB 14.2 10/12/2023    HCT 42.6 10/12/2023    MCV 95 10/12/2023     10/12/2023     Lab Results   Component Value Date    GLUCOSE 145 (H) 10/12/2023    CALCIUM 8.8 10/12/2023     10/12/2023    K 3.6 10/12/2023    CO2 26 10/12/2023     10/12/2023    BUN 19 10/12/2023    CREATININE 0.79 10/12/2023     Lab Results   Component Value Date    TROPHS 6 10/12/2023     Imaging -   CT head wo IV contrast   Final Result   Cerebral atrophy and chronic periventricular white matter small   vessel ischemic change.        No evidence of acute intracranial hemorrhage.      "   If there is persist concern for acute ischemia or intracranial   process, MRI may be obtained for further evaluation as clinically   indicated.                  MACRO:   None        Signed by: Estevan  10/12/2023 9:31 PM   Dictation workstation:   [id number]        Interventions - Neurochecks, reported NIHSS at 6    ROS: 12 points review of system is negative except as stated in the HPI above.     Review of Systems   Constitutional: Negative.    HENT:  Positive for drooling and trouble swallowing.    Respiratory: Negative.     Cardiovascular:  Positive for leg swelling.   Gastrointestinal: Negative.    Genitourinary: Negative.    Musculoskeletal: Negative.    Neurological:  Positive for facial asymmetry, speech difficulty, weakness and headaches. Negative for dizziness, tremors, seizures, syncope, light-headedness and numbness.   Hematological: Negative.    Psychiatric/Behavioral: Negative.        Past Medical History     Past Medical History:   Diagnosis Date    Body mass index (BMI) 29.0-29.9, adult 12/11/2020    Body mass index (BMI) of 29.0 to 29.9 in adult    Body mass index (BMI) 31.0-31.9, adult 04/26/2019    BMI 31.0-31.9,adult    Contact with and (suspected) exposure to potentially hazardous body fluids 04/02/2018    Exposure to blood    Disorder of male genital organs, unspecified 04/02/2018    Disorder of male genital organs    History of falling 01/16/2015    History of fall    Laceration without foreign body of unspecified part of head, initial encounter 05/15/2018    Laceration of head    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    Nontraumatic chronic subdural hemorrhage    Other cranial cerebrospinal fluid leak     Subdural hygroma    Other fecal abnormalities 05/15/2017    Occult blood positive stool    Other symptoms and signs involving cognitive functions and awareness 08/03/2015    Cognitive impairment    Pain in left shoulder 08/08/2016    Acute pain of left shoulder  "   Personal history of other diseases of the musculoskeletal system and connective tissue 11/04/2014    History of back pain    Postconcussional syndrome 08/03/2015    Post concussion syndrome      Surgical History     Past Surgical History:   Procedure Laterality Date    HERNIA REPAIR  06/02/2014    Hernia Repair    KNEE SURGERY  06/02/2014    Knee Surgery    OTHER SURGICAL HISTORY  03/25/2022    Inguinal hernia repair laparoscopic     Family History     Family History   Problem Relation Name Age of Onset    Skin cancer Father      Hypertension Son      Diabetes Other      Prostate cancer Other       Social History     Social History     Tobacco Use    Smoking status: Never    Smokeless tobacco: Never   Substance Use Topics    Alcohol use: Never    Drug use: Never     Allergies   No Known Allergies     Meds      Scheduled medications  aspirin, 325 mg, oral, Once   Or  aspirin, 324 mg, oral, Once   Or  aspirin, 324 mg, nasogastric tube, Once   Or  aspirin, 300 mg, rectal, Once  [START ON 10/14/2023] aspirin, 81 mg, oral, Daily   Or  [START ON 10/14/2023] aspirin, 81 mg, oral, Daily   Or  [START ON 10/14/2023] aspirin, 81 mg, nasogastric tube, Daily   Or  [START ON 10/14/2023] aspirin, 300 mg, rectal, Daily  atorvastatin, 80 mg, oral, Nightly  clopidogrel, 300 mg, oral, Once   And  [START ON 10/14/2023] clopidogrel, 75 mg, oral, Daily  hydrALAZINE, 10 mg, intravenous, q20 min  [Held by provider] lisinopril 20 mg, hydroCHLOROthiazide 12.5 mg for Zestoretic/Prinizide, , oral, Daily  sennosides, 2 tablet, oral, BID  sennosides, 2 tablet, oral, BID     PRN medications  PRN medications: benzocaine-menthol, hydrALAZINE **FOLLOWED BY** hydrALAZINE, labetaloL, oxygen    Objective     Vitals  Visit Vitals  /79   Pulse 91   Temp 36.5 °C (97.7 °F) (Temporal)   Resp 17   Ht 1.854 m (6' 1\")   Wt 102 kg (225 lb 15.5 oz)   SpO2 97%   BMI 29.81 kg/m²   Smoking Status Never   BSA 2.3 m²     Physical Exam  Constitutional:       " Appearance: Normal appearance.   HENT:      Head: Normocephalic and atraumatic.      Nose: Nose normal.      Mouth/Throat:      Mouth: Mucous membranes are moist.      Pharynx: Oropharynx is clear.   Eyes:      General: Visual field deficit present.      Extraocular Movements: Extraocular movements intact.      Conjunctiva/sclera: Conjunctivae normal.      Pupils: Pupils are equal, round, and reactive to light.   Cardiovascular:      Rate and Rhythm: Normal rate. Rhythm irregular.      Heart sounds: Normal heart sounds. No murmur heard.     No friction rub. No gallop.   Pulmonary:      Effort: Pulmonary effort is normal.      Breath sounds: Normal breath sounds. No wheezing, rhonchi or rales.   Abdominal:      General: Abdomen is flat. Bowel sounds are normal. There is no distension.      Palpations: Abdomen is soft.      Tenderness: There is no abdominal tenderness. There is no guarding.      Hernia: No hernia is present.   Musculoskeletal:         General: Normal range of motion.      Cervical back: Normal range of motion and neck supple.      Right lower leg: Edema present.      Left lower leg: Edema present.   Skin:     General: Skin is warm and dry.   Neurological:      Mental Status: He is alert and oriented to person, place, and time.      Cranial Nerves: Cranial nerve deficit and dysarthria present. No facial asymmetry.      Sensory: No sensory deficit.      Motor: No weakness, abnormal muscle tone or pronator drift.      Coordination: Finger-Nose-Finger Test and Heel to Shin Test normal.      Deep Tendon Reflexes: Reflexes normal.     NIHSS: 4 (partial hemianopia in the right upper quadrant, mild-moderate aphasia, mild-moderate dysarthria, visual inattention)    Labs:   Results from last 72 hours   Lab Units 10/12/23  2205   SODIUM mmol/L 139   POTASSIUM mmol/L 3.6   CHLORIDE mmol/L 105   CO2 mmol/L 26   BUN mg/dL 19   CREATININE mg/dL 0.79   GLUCOSE mg/dL 145*   CALCIUM mg/dL 8.8   ANION GAP mmol/L 12    EGFR mL/min/1.73m*2 89      Results from last 72 hours   Lab Units 10/12/23  2002   WBC AUTO x10*3/uL 7.2   HEMOGLOBIN g/dL 14.2   HEMATOCRIT % 42.6   PLATELETS AUTO x10*3/uL 236   NEUTROS PCT AUTO % 69.1   LYMPHS PCT AUTO % 17.1   MONOS PCT AUTO % 10.9   EOS PCT AUTO % 1.4      Lab Results   Component Value Date    CALCIUM 8.8 10/12/2023    PHOS 3.5 04/19/2021      Images      CT head wo IV contrast   Final Result   Cerebral atrophy and chronic periventricular white matter small   vessel ischemic change.        No evidence of acute intracranial hemorrhage.        If there is persist concern for acute ischemia or intracranial   process, MRI may be obtained for further evaluation as clinically   indicated.                  MACRO:   None        Signed by: Estevan Gutiérrez 10/12/2023 9:31 PM   Dictation workstation:   [id number]         Assessment     Johnnie Rudolph is a 83 y.o. male presenting for new onset stroke like symptoms with a last known well of 2200 10/11. His pertinent medical history includes HLD, HTN, and atrial fibrillation with questionable anticoagulation. His NIHSS decreased from 6 at initial assessment by the ED to 4 as assessed by the admitting team. CT head did not show an acute intracranial bleed. The patient was treated for acute ischemic stroke outside of the therapeutic window for throbolytic therapy and thrombectomy. His deficits were concentrated in speech and visual areas, suggesting an ischemic event in the posterior circulation.    Acute medical issues  # Ischemic stroke  - Atorvastatin 80 mg  - Clopidogrel 300mg loading/75mg maintenance  - Aspirin 324mg loading/81mg maintenance  - MRI brain w/o contrast  - MRA head and neck  - Echo cardiogram complete  - BNP, A1c, lipids  - Neurology consult  - SLP evaluation of dysarthria and aphasia  -- NPO pending swallow eval  - q4hr neurochecks  - PT/OT/MT eval and treat    # Atrial fibrillation  - Telemetry  - Anticoagulation held at this time due to  DAPT therapy  - Consider restarting home metoprolol after permissive hypertension    # Lower limb edema  - Ultrasound lower limbs bilaterally for DVT rule out    Chronic medical issues  # HTN  - Hold home metoprolol 25mg  - Hold home lisinopril 20  - Hold home hydrochlorothiazide    # HLD  - Atorvastatin 80mg    # DM2 not on insulin  - Hold home oral agents  - Moderate sliding scale    F PO after swallow eval  E Replete as needed  N NPO until swallow eval  G Senna    Dispo: Patient is an 84 y/o admitted from home for an ischemic stroke. eLOS 4 days.    Jasvir Sneed MD

## 2023-10-13 NOTE — HOSPITAL COURSE
Johnnie Rudolph is an 83 year old male with PMH of HTN, HLD, A-fib not on anticoagulation, and T2DM who presented to the ED with stroke-like symptoms. Patient reported having a headache on the night prior to admission, but took a Motrin with resolution of symptoms. On the morning of admission, the patient has had worsening gait, and knocked over several glasses at breakfast. Later on in the afternoon, his wife noticed that he's had unilateral facial droop, drooling, aphasia, and dysarthria. Upon arrival to the ED, the patient received a CT head w/o contrast which did not show an acute brain bleed. He was loaded with aspirin, Plavix, started on atorvastatin 80, and held home anti-hypertensive medications due to permissive HTN. Neuro consulted. Echo ordered, negative bubble study, EF 55-60%. A1c 7.1%. MRI head and MRA Head/neck w/o contrast ordered which showed scattered foci of hyperintensity in the left MCA suggesting pseudonormalization or subacute infarcts with minimal petechial hemorrhage, and MRA did not show any significant stenosis. Held lengthy discussion with pharmacy team about medications going forward, and the family is agreeable to starting the patient on Xarelto and aspirin. Discussed with neurology about outpatient plan going forward, neurology states that the patient had acute ischemic strokes in the left MCA territory with minimal petechial hemorrhage, and they recommend delaying starting Xarelto for 7 days. In the meantime, the patient should start taking aspirin 81 mg and Plavix 75 mg daily. He will also start taking a statin. Patient will follow up with PCP in 7 days and follow up with neurology within 3 weeks. On the morning of discharge, the patient was able to ambulate by himself using a walker without the assistance of others. Patient will be discharged home in stable condition.

## 2023-10-13 NOTE — CARE PLAN
POC  Problem: Balance  Goal: Pt will demo good static/dynamic balance during ADL and functional activity with LRAD >10 minutes for improved independence and participation in ADL   Outcome: Progressing     Problem: Bathing  Goal: Patient will bathe body at mod I   Outcome: Progressing     Problem: Dressings Lower Extremities  Goal: Patient to complete lower body dressing with LRAD at mod I   Outcome: Progressing     Problem: Mobility  Goal: Pt will demo increased functional mobility to tolerate tasks necessary to complete ADL routine with LRAD at mod I   Outcome: Progressing     Problem: Toileting  Goal: Patient will complete toileting tasks with LRAD at mod I   Outcome: Progressing     Problem: Transfers  Goal: Pt to demonstrate transfers with LRAD at mod I   Outcome: Progressing
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
## 2023-10-13 NOTE — PROGRESS NOTES
For full history, physical exam, and prior hospital course, please see previous ED provider note. This is in addition to the primary record.    Comments/Additional Findings: Patient was signed out to me by Dr Baeza at change of shift.   Pending items at this time include work-up to resolve.  Patient outside of any window for any intervention given last known well was over 24 hours ago.  Plan be to admit for a full stroke work-up.    NIH remains the same at 6.  Patient notes they noticed the left facial droop and he is drooling all day today.  They state that still remains the same and has not changed.    Patient requires continued workup and management of their symptoms and will be admitted to the hospital for further evaluation and treatment.     Spoke with Dr Arnett and discussed case and he agrees on admission under Dr. Hernandez      I discussed the differential, results and plan with the patient and/or family/friend/caregiver if present. All questions answered.                   Note: This note was dictated by speech recognition. Minor errors in transcription may be present.

## 2023-10-13 NOTE — PROGRESS NOTES
Occupational Therapy    Evaluation    Patient Name: Johnnie Rudolph  MRN: [id number]  Today's Date: 10/13/2023  Time Calculation  Start Time: 0840  Stop Time: 0857  Time Calculation (min): 17 min    Assessment  IP OT Assessment  OT Assessment: Pt is a 84 y/o male who was admitted for stroke like symptoms. Pt is typically independent and uses no AD for mobility. Pt is close to his baseline. Skilled OT services are warranted to increase independence with ADL's, transfers, and mobility.  Prognosis: Excellent  Evaluation/Treatment Tolerance: Patient tolerated treatment well  Medical Staff Made Aware: Yes  End of Session Communication: Bedside nurse  End of Session Patient Position: Up in chair, Alarm on  Plan:  Treatment Interventions: ADL retraining, Functional transfer training  OT Frequency: 2 times per week  OT Discharge Recommendations: Low intensity level of continued care  Equipment Recommended upon Discharge: Wheeled walker  OT Recommended Transfer Status: Assist of 1, Stand by assist    Subjective   Current Problem:  1. Stroke-like symptoms  Lower extremity venous duplex bilateral    Lower extremity venous duplex bilateral    Transthoracic Echo (TTE) Complete    Transthoracic Echo (TTE) Complete      2. DVT, lower extremity, distal, acute, left (CMS/HCC)  Lower extremity venous duplex bilateral    Lower extremity venous duplex bilateral      3. Other cerebral infarction (CMS/HCC)  Transthoracic Echo (TTE) Complete    Transthoracic Echo (TTE) Complete        General:  General  Reason for Referral: Pt is a 84 y/o male who was admitted for stroke like symptoms  Past Medical History Relevant to Rehab: Pertinent medical history includes HLD, HTN, and atrial fibrillation with questionable anticoagulation.  Family/Caregiver Present: No  Co-Treatment: PT  Co-Treatment Reason: To maximize pt's outcomes  Prior to Session Communication: Bedside nurse  Patient Position Received: Bed, 3 rail up, Alarm off, not on at start of  session  Preferred Learning Style: verbal  General Comment: Pt sitting up in the bed and agreeable to OT eval. Pt denying any current deficits and feels like things have resolved.  Precautions:  Medical Precautions: Fall precautions (external catheter, telemetry, alarms)  Pain:  Pain Assessment  Pain Assessment: 0-10  Pain Score: 0 - No pain    Objective   Cognition:  Overall Cognitive Status: Within Functional Limits  Orientation Level: Oriented X4  Home Living:  Type of Home: House  Lives With: Significant other  Home Adaptive Equipment: None  Home Layout: One level  Home Access: Level entry, No concerns  Bathroom Shower/Tub: Walk-in shower   Prior Function:  Level of [place]: Independent with ADLs and functional transfers, Independent with homemaking with ambulation  ADL Assistance: Independent  Homemaking Assistance: Independent  Ambulatory Assistance: Independent  Prior Function Comments: +driving  ADL:  LE Dressing Assistance: Minimal  LE Dressing Deficit: Other (Comment) (Pt was able to doff socks. Pt required min A to don sock on the R foot, but was able to don sock on the L foot. Pt able to don hospital pants over both LE's and stand up with walker to pull clothing over his hips.)  Activity Tolerance:  Endurance: Endurance does not limit participation in activity  Bed Mobility/Transfers: Bed Mobility  Bed Mobility: Yes  Bed Mobility 1  Bed Mobility 1: Supine to sitting  Level of Assistance 1: Independent   and Transfers  Transfer: Yes  Transfer 1  Technique 1: Sit to stand, Stand to sit  Transfer Device 1: Walker  Transfer Level of Assistance 1: Close supervision  Transfers 2  Trials/Comments 2: Pt ambulated within the room with walker at supervision     Vision: Vision - Basic Assessment  Current Vision: No visual deficits  Sensation:  Light Touch: No apparent deficits  Strength:  Strength Comments: B UE's: 4+/5  Coordination:  Finger to Nose: Intact   Hand Function:  Hand Function  Gross Grasp:  Functional  Coordination: Functional  Extremities: RUE   RUE : Within Functional Limits and LUE   LUE: Within Functional Limits    Outcome Measures: [facility] Daily Activity  Putting on and taking off regular lower body clothing: A little  Bathing (including washing, rinsing, drying): A little  Putting on and taking off regular upper body clothing: None  Toileting, which includes using toilet, bedpan or urinal: A little  Taking care of personal grooming such as brushing teeth: None  Eating Meals: None  Daily Activity - Total Score: 21    Education Documentation  ADL Training, taught by Caitlin Mccoy OT at 10/13/2023  9:49 AM.  Learner: Patient  Readiness: Acceptance  Method: Explanation  Response: Verbalizes Understanding, Demonstrated Understanding      Education Comments  No comments found.      Goals:   Encounter Problems       Encounter Problems (Active)       Balance       Pt will demo good static/dynamic balance during ADL and functional activity with LRAD >10 minutes for improved independence and participation in ADL  (Progressing)       Start:  10/13/23    Expected End:  10/27/23               Bathing       Patient will bathe body at mod I  (Progressing)       Start:  10/13/23    Expected End:  10/27/23               Dressings Lower Extremities       Patient to complete lower body dressing with LRAD at mod I  (Progressing)       Start:  10/13/23    Expected End:  10/27/23               Mobility       Pt will demo increased functional mobility to tolerate tasks necessary to complete ADL routine with LRAD at mod I  (Progressing)       Start:  10/13/23    Expected End:  10/27/23               Toileting       Patient will complete toileting tasks with LRAD at mod I  (Progressing)       Start:  10/13/23    Expected End:  10/27/23                     Transfers       Pt to demonstrate transfers with LRAD at mod I  (Progressing)       Start:  10/13/23    Expected End:  10/27/23

## 2023-10-13 NOTE — PROGRESS NOTES
10/13/23 1457   Discharge Planning   Living Arrangements Spouse/significant other   Support Systems Spouse/significant other;Children   Assistance Needed Independent with ADL's, Uses a cane, Drives (but family is concerned and feels patient should not be driving), No home O2, [employer] brochure provided to the patient's family   Type of Residence Private residence   Number of Stairs to Enter Residence 0   Number of Stairs Within Residence 10   Who is requesting discharge planning? Provider   Home or Post Acute Services None   Patient expects to be discharged to: Home with significant other and no discharge needs. PT evaluation recommended low intensity rehab, patient and S.O. declined the need.   Does the patient need discharge transport arranged? No   Financial Resource Strain   How hard is it for you to pay for the very basics like food, housing, medical care, and heating? Not hard   Housing Stability   In the last 12 months, was there a time when you were not able to pay the mortgage or rent on time? N   In the last 12 months, how many places have you lived? 1   In the last 12 months, was there a time when you did not have a steady place to sleep or slept in a shelter (including now)? N   Transportation Needs   In the past 12 months, has lack of transportation kept you from medical appointments or from getting medications? no   In the past 12 months, has lack of transportation kept you from meetings, work, or from getting things needed for daily living? No

## 2023-10-13 NOTE — CARE PLAN
The patient's goals for the shift include  tests    The clinical goals for the shift include MRI    Over the shift, the patient did not make progress toward the following goals. Barriers to progression include Left sided deficits and aphasia. Recommendations to address these barriers include follow plan of care, PT/OT.

## 2023-10-13 NOTE — PROGRESS NOTES
Bijan Rudolph is a 83 y.o. male on day 0 of admission presenting with Stroke-like symptoms.      Subjective   Patient is showing improvement of stroke symptoms from admission. Patient is unable to definitively recall what happened that brought him to the ED. No acute complaints at this time.     Objective     Last Recorded Vitals  /83 (BP Location: Left arm, Patient Position: Sitting)   Pulse 82   Temp 36.3 °C (97.3 °F)   Resp 16   Wt 97.8 kg (215 lb 9.8 oz)   SpO2 94%   Intake/Output last 3 Shifts:    Intake/Output Summary (Last 24 hours) at 10/13/2023 1409  Last data filed at 10/13/2023 0800  Gross per 24 hour   Intake 0 ml   Output 200 ml   Net -200 ml       Admission Weight  Weight: 102 kg (225 lb 15.5 oz) (10/12/23 2100)    Daily Weight  10/13/23 : 97.8 kg (215 lb 9.8 oz)    Image Results  Transthoracic Echo (TTE) [facility], [address] [place] [address]                Tel [phone] and Fax [phone]    TRANSTHORACIC ECHOCARDIOGRAM REPORT       Patient Name:      BIJAN RUDOLPH       Reading Physician:    [id number] Quinn Adams MD  Study Date:        10/13/2023           Ordering Provider:    [id number] STEVEN CASILLAS  MRN/PID:           [id number]             Fellow:  Accession#:        [id number]         Nurse:                Nga Jeffers RN  Date of Birth/Age: 1940 / 83 years Sonographer:          Jasmin Santos RDCS  Gender:            M                    Additional Staff:  Height:            185.42 cm            Admit Date:           10/12/2023  Weight:            97.52 kg             Admission Status:     Inpatient - Time                                                                Critical  BSA:               2.22 m2              Encounter#:            [id number]                                          Department Location:  [address]  Blood Pressure: 174 /93 mmHg    Study Type:    TRANSTHORACIC ECHO (TTE) COMPLETE  Diagnosis/ICD: Other cerebral infarction-I63.89  Indication:    Stroke  CPT Code:      Echo Complete w Full Doppler-[id number]    Patient History:  Pertinent History: Aphasia, Dysarthria, Prostate ca.    Study Detail: The following Echo studies were performed: 2D, M-Mode, Doppler and                color flow. Agitated saline used as a contrast agent for                intraseptal flow evaluation. Unable to obtain suprasternal notch                view. The patient was awake.       PHYSICIAN INTERPRETATION:  Left Ventricle: The left ventricular systolic function is normal, with an estimated ejection fraction of 55-60%. The left ventricular cavity size is normal. Spectral Doppler shows an abnormal pattern of left ventricular diastolic filling.  Left Atrium: The left atrium is upper limits of normal in size. A bubble study using agitated saline was performed. Bubble study is negative.  Right Ventricle: The right ventricle is normal in size. There is normal right ventricular global systolic function.  Right Atrium: The right atrium is upper limits of normal in size. The right atrium has a prominent Chiari network.  Aortic Valve: The aortic valve is trileaflet. There is mild aortic valve cusp calcification. There is no evidence of aortic valve stenosis.  The aortic valve dimensionless index is 0.71. There is no evidence of aortic valve regurgitation. The peak instantaneous gradient of the aortic valve is 4.7 mmHg. The mean gradient of the aortic valve is 3.0 mmHg.  Mitral Valve: The mitral valve is mild to moderately thickened. There is mild to moderate mitral annular calcification. There is trace to mild mitral valve regurgitation.  Tricuspid Valve: The tricuspid valve is structurally normal. There is mild tricuspid regurgitation. The Doppler estimated  RVSP is mildly elevated at 41.9 mmHg.  Pulmonic Valve: The pulmonic valve is structurally normal. There is mild pulmonic valve regurgitation.  Pericardium: There is no pericardial effusion noted.  Aorta: The aortic root is normal. The aortic root is at the upper limits of normal size.  Systemic Veins: The inferior vena cava appears dilated. There is IVC inspiratory collapse greater than 50%.  In comparison to the previous echocardiogram(s): There are no prior studies on this patient for comparison purposes.       CONCLUSIONS:   1. Left ventricular systolic function is normal with a 55-60% estimated ejection fraction.   2. Spectral Doppler shows an abnormal pattern of left ventricular diastolic filling.   3. Mild tricuspid regurgitation visualized.   4. Mildly elevated RVSP.    QUANTITATIVE DATA SUMMARY:  2D MEASUREMENTS:                            Normal Ranges:  IVSd:          1.14 cm    (0.6-1.1cm)  LVPWd:         1.16 cm    (0.6-1.1cm)  LVIDd:         5.42 cm    (3.9-5.9cm)  LVIDs:         4.04 cm  LV Mass Index: 113.1 g/m2  LV % FS        25.5 %    LA VOLUME:                                Normal Ranges:  LA Vol A4C:        71.1 ml    (22+/-6mL/m2)  LA Vol A2C:        70.2 ml  LA Vol BP:         70.8 ml  LA Vol Index A4C:  32.1ml/m2  LA Vol Index A2C:  31.6 ml/m2  LA Vol Index BP:   31.9 ml/m2  LA Area A4C:       21.8 cm2  LA Area A2C:       21.7 cm2  LA Major Axis A4C: 5.7 cm  LA Major Axis A2C: 5.7 cm  LA Volume Index:   30.2 ml/m2  LA Vol A4C:        66.0 ml  LA Vol A2C:        67.0 ml    RA VOLUME BY A/L METHOD:                        Normal Ranges:  RA Area A4C: 18.8 cm2    AORTA MEASUREMENTS:                     Normal Ranges:  Asc Ao, d: 3.20 cm (2.1-3.4cm)    LV SYSTOLIC FUNCTION BY 2D PLANIMETRY (MOD):                      Normal Ranges:  EF-A4C View: 58.4 % (>=55%)  EF-A2C View: 55.9 %  EF-Biplane:  56.6 %    AORTIC VALVE:                                    Normal Ranges:  AoV Vmax:                1.08  m/s (<=1.7m/s)  AoV Peak P.7 mmHg (<20mmHg)  AoV Mean PG:             3.0 mmHg (1.7-11.5mmHg)  LVOT Max Anand:            0.86 m/s (<=1.1m/s)  AoV VTI:                 21.00 cm (18-25cm)  LVOT VTI:                15.00 cm  LVOT Diameter:           2.20 cm  (1.8-2.4cm)  AoV Area, VTI:           2.72 cm2 (2.5-5.5cm2)  AoV Area,Vmax:           3.04 cm2 (2.5-4.5cm2)  AoV Dimensionless Index: 0.71       RIGHT VENTRICLE:  RV Basal 3.55 cm  RV Mid   3.01 cm  RV Major 8.0 cm  TAPSE:   18.4 mm  RV s'    0.16 m/s    TRICUSPID VALVE/RVSP:                              Normal Ranges:  Peak TR Velocity: 2.91 m/s  RV Syst Pressure: 41.9 mmHg (< 30mmHg)  IVC Diam:         2.25 cm    PULMONIC VALVE:                       Normal Ranges:  PV Max Anand: 0.5 m/s  (0.6-0.9m/s)  PV Max P.0 mmHg       [id number] Quinn Adams MD  Electronically signed on 10/13/2023 at 11:40:17 AM       ** Final **  Lower extremity venous duplex bilateral            [place] [address]   Tel [phone] and Fax [phone]       Vascular Lab Report  Lower Venous Duplex Ultrasound       Patient Name:      BIJAN SINGH       Reading Physician:  [id number] Paramjit Johnson MD, RPVI  Study Date:        10/13/2023           Ordering Provider:  [id number] GORGE ARRIAGA  MRN/PID:           [id number]             Technologist:       Brandy Nolan S  Accession#:        [id number]         Technologist 2:  Date of Birth/Age: 1940 / 83 years Encounter#:         [id number]  Gender:            M  Admission Status:  Inpatient            Location Performed: [facility]       Diagnosis/ICD: Localized (leg) edema-R60.0       CONCLUSIONS:  Right Lower Venous: No evidence of acute deep vein thrombus visualized in the right lower extremity.  Left Lower Venous: No evidence of acute deep vein thrombus visualized in the left lower extremity.      Comparison:  Compared with study from 4/20/2020, Onlyl LLe previous exam, no change.     Imaging & Doppler Findings:     Right                 Compressible Thrombus        Flow  Distal External Iliac     Yes        None   Spontaneous/Phasic  CFV                       Yes        None   Spontaneous/Phasic  PFV                       Yes        None  FV Proximal               Yes        None   Spontaneous/Phasic  FV Mid                    Yes        None  FV Distal                 Yes        None  Popliteal                 Yes        None   Spontaneous/Phasic  Peroneal                  Yes        None  PTV                       Yes        None       Left                  Compress Thrombus        Flow  Distal External Iliac   Yes      None   Spontaneous/Phasic  CFV                     Yes      None   Spontaneous/Phasic  PFV                     Yes      None  FV Proximal             Yes      None   Spontaneous/Phasic  FV Mid                  Yes      None  FV Distal               Yes      None  Popliteal               Yes      None   Spontaneous/Phasic  Peroneal                Yes      None  PTV                     Yes      None       [id number] Paramjit Johnson MD, RPBRISSA  Electronically signed by [id number] Paramjit Johnson MD, RPBRISSA on 10/13/2023 at 10:18:59 AM       ** Final **      Physical Exam  Vitals reviewed.   Constitutional:       Appearance: Normal appearance. He is overweight.   Cardiovascular:      Rate and Rhythm: Normal rate. Rhythm irregular.      Pulses: Normal pulses.      Heart sounds: Normal heart sounds. No murmur heard.  Pulmonary:      Effort: Pulmonary effort is normal. No respiratory distress.      Breath sounds: Normal breath sounds. No wheezing.   Chest:      Chest wall: No tenderness.   Abdominal:      General: Abdomen is flat. Bowel sounds are normal. There is no distension.      Palpations: Abdomen is soft.      Tenderness: There is no abdominal tenderness.   Musculoskeletal:      Right lower leg: No  edema.      Left lower leg: No edema.   Neurological:      General: No focal deficit present.      Mental Status: He is alert and oriented to person, place, and time. Mental status is at baseline.      Comments: Improvement from night shift. Strength intact. NIHSS 1.          Relevant Results  Scheduled medications  [START ON 10/14/2023] aspirin, 81 mg, oral, Daily   Or  [START ON 10/14/2023] aspirin, 81 mg, oral, Daily   Or  [START ON 10/14/2023] aspirin, 81 mg, nasogastric tube, Daily   Or  [START ON 10/14/2023] aspirin, 300 mg, rectal, Daily  atorvastatin, 80 mg, oral, Nightly  [START ON 10/14/2023] clopidogrel, 75 mg, oral, Daily  insulin lispro, 0-10 Units, subcutaneous, TID with meals  [Held by provider] lisinopril 20 mg, hydroCHLOROthiazide 12.5 mg for Zestoretic/Prinizide, , oral, Daily  perflutren lipid microspheres, 0.5-10 mL of dilution, intravenous, Once in imaging  perflutren protein A microsphere, 0.5 mL, intravenous, Once in imaging  sennosides, 2 tablet, oral, BID  sulfur hexafluoride microsphr, 2 mL, intravenous, Once in imaging      Continuous medications     PRN medications  PRN medications: benzocaine-menthol, dextrose 10 % in water (D10W), dextrose, glucagon, [] hydrALAZINE **FOLLOWED BY** hydrALAZINE, labetaloL, oxygen    Results for orders placed or performed during the hospital encounter of 10/12/23 (from the past 24 hour(s))   CBC and Auto Differential   Result Value Ref Range    WBC 7.2 4.4 - 11.3 x10*3/uL    nRBC 0.0 0.0 - 0.0 /100 WBCs    RBC 4.51 4.50 - 5.90 x10*6/uL    Hemoglobin 14.2 13.5 - 17.5 g/dL    Hematocrit 42.6 41.0 - 52.0 %    MCV 95 80 - 100 fL    MCH 31.5 26.0 - 34.0 pg    MCHC 33.3 32.0 - 36.0 g/dL    RDW 13.2 11.5 - 14.5 %    Platelets 236 150 - 450 x10*3/uL    MPV 10.2 7.5 - 11.5 fL    Neutrophils % 69.1 40.0 - 80.0 %    Immature Granulocytes %, Automated 0.8 0.0 - 0.9 %    Lymphocytes % 17.1 13.0 - 44.0 %    Monocytes % 10.9 2.0 - 10.0 %    Eosinophils % 1.4 0.0 -  6.0 %    Basophils % 0.7 0.0 - 2.0 %    Neutrophils Absolute 4.96 1.60 - 5.50 x10*3/uL    Immature Granulocytes Absolute, Automated 0.06 0.00 - 0.50 x10*3/uL    Lymphocytes Absolute 1.23 0.80 - 3.00 x10*3/uL    Monocytes Absolute 0.78 0.05 - 0.80 x10*3/uL    Eosinophils Absolute 0.10 0.00 - 0.40 x10*3/uL    Basophils Absolute 0.05 0.00 - 0.10 x10*3/uL   Comprehensive metabolic panel   Result Value Ref Range    Glucose      Sodium      Potassium      Chloride      Bicarbonate      Anion Gap      Urea Nitrogen      Creatinine      eGFR      Calcium      Albumin      Alkaline Phosphatase      Total Protein      AST      Bilirubin, Total      ALT     Troponin I, High Sensitivity   Result Value Ref Range    Troponin I, High Sensitivity 6 0 - 20 ng/L   Protime-INR   Result Value Ref Range    Protime 13.7 (H) 9.8 - 12.8 seconds    INR 1.2 (H) 0.9 - 1.1   APTT   Result Value Ref Range    aPTT 33 27 - 38 seconds   POCT GLUCOSE   Result Value Ref Range    POCT Glucose 164 (H) 74 - 99 mg/dL   Blood Gas Venous Full Panel Unsolicited   Result Value Ref Range    POCT pH, Venous 7.42 7.33 - 7.43 pH    POCT pCO2, Venous 40 (L) 41 - 51 mm Hg    POCT pO2, Venous 44 35 - 45 mm Hg    POCT SO2, Venous 74 45 - 75 %    POCT Oxy Hemoglobin, Venous 72.3 45.0 - 75.0 %    POCT Hematocrit Calculated, Venous 38.0 (L) 41.0 - 52.0 %    POCT Sodium, Venous 137 136 - 145 mmol/L    POCT Potassium, Venous 3.7 3.5 - 5.3 mmol/L    POCT Chloride, Venous 110 (H) 98 - 107 mmol/L    POCT Ionized Calicum, Venous 1.11 1.10 - 1.33 mmol/L    POCT Glucose, Venous 177 (H) 74 - 99 mg/dL    POCT Lactate, Venous 1.9 0.4 - 2.0 mmol/L    POCT Base Excess, Venous 1.3 -2.0 - 3.0 mmol/L    POCT HCO3 Calculated, Venous 25.9 22.0 - 26.0 mmol/L    POCT Hemoglobin, Venous 12.5 (L) 13.5 - 17.5 g/dL    POCT Anion Gap, Venous 5.0 (L) 10.0 - 25.0 mmol/L    Patient Temperature 37.0 degrees Celsius    Apparatus NON REBREATHER     Flow 15.0 LPM   Coox Panel, Venous Unsolicited    Result Value Ref Range    POCT Carboxyhemoglobin, Venous 1.3 %    POCT Methemoglobin, Venous 1.3 0.0 - 1.5 %   Comprehensive metabolic panel   Result Value Ref Range    Glucose 145 (H) 74 - 99 mg/dL    Sodium 139 136 - 145 mmol/L    Potassium 3.6 3.5 - 5.3 mmol/L    Chloride 105 98 - 107 mmol/L    Bicarbonate 26 21 - 32 mmol/L    Anion Gap 12 10 - 20 mmol/L    Urea Nitrogen 19 6 - 23 mg/dL    Creatinine 0.79 0.50 - 1.30 mg/dL    eGFR 89 >60 mL/min/1.73m*2    Calcium 8.8 8.6 - 10.3 mg/dL    Albumin 3.8 3.4 - 5.0 g/dL    Alkaline Phosphatase 81 33 - 136 U/L    Total Protein 6.4 6.4 - 8.2 g/dL    AST 18 9 - 39 U/L    Bilirubin, Total 0.9 0.0 - 1.2 mg/dL    ALT 16 10 - 52 U/L   Lipid Panel Non-Fasting   Result Value Ref Range    Cholesterol 121 0 - 199 mg/dL    HDL-Cholesterol 46.8 mg/dL    Cholesterol/HDL Ratio 2.6     Non-HDL Cholesterol 74 0 - 149 mg/dL   Urinalysis with Reflex Microscopic   Result Value Ref Range    Color, Urine Yellow Straw, Yellow    Appearance, Urine Clear Clear    Specific Gravity, Urine 1.018 1.005 - 1.035    pH, Urine 6.0 5.0, 5.5, 6.0, 6.5, 7.0, 7.5, 8.0    Protein, Urine 30 (1+) (N) NEGATIVE mg/dL    Glucose, Urine 50 (1+) (A) NEGATIVE mg/dL    Blood, Urine SMALL (1+) (A) NEGATIVE    Ketones, Urine 5 (TRACE) (A) NEGATIVE mg/dL    Bilirubin, Urine NEGATIVE NEGATIVE    Urobilinogen, Urine 2.0 (N) <2.0 mg/dL    Nitrite, Urine NEGATIVE NEGATIVE    Leukocyte Esterase, Urine NEGATIVE NEGATIVE   Urinalysis Microscopic Only   Result Value Ref Range    WBC, Urine 1-5 1-5, NONE /HPF    RBC, Urine 1-2 NONE, 1-2, 3-5 /HPF   CBC   Result Value Ref Range    WBC 8.3 4.4 - 11.3 x10*3/uL    nRBC 0.0 0.0 - 0.0 /100 WBCs    RBC 4.33 (L) 4.50 - 5.90 x10*6/uL    Hemoglobin 13.7 13.5 - 17.5 g/dL    Hematocrit 40.6 (L) 41.0 - 52.0 %    MCV 94 80 - 100 fL    MCH 31.6 26.0 - 34.0 pg    MCHC 33.7 32.0 - 36.0 g/dL    RDW 13.2 11.5 - 14.5 %    Platelets 202 150 - 450 x10*3/uL    MPV 10.2 7.5 - 11.5 fL   Comprehensive  metabolic panel   Result Value Ref Range    Glucose 143 (H) 74 - 99 mg/dL    Sodium 138 136 - 145 mmol/L    Potassium 3.4 (L) 3.5 - 5.3 mmol/L    Chloride 103 98 - 107 mmol/L    Bicarbonate 26 21 - 32 mmol/L    Anion Gap 12 10 - 20 mmol/L    Urea Nitrogen 14 6 - 23 mg/dL    Creatinine 0.66 0.50 - 1.30 mg/dL    eGFR >90 >60 mL/min/1.73m*2    Calcium 8.6 8.6 - 10.3 mg/dL    Albumin 3.7 3.4 - 5.0 g/dL    Alkaline Phosphatase 83 33 - 136 U/L    Total Protein 6.4 6.4 - 8.2 g/dL    AST 19 9 - 39 U/L    Bilirubin, Total 1.5 (H) 0.0 - 1.2 mg/dL    ALT 16 10 - 52 U/L   B-Type Natriuretic Peptide   Result Value Ref Range     (H) 0 - 99 pg/mL   POCT GLUCOSE   Result Value Ref Range    POCT Glucose 147 (H) 74 - 99 mg/dL   Transthoracic Echo (TTE) Complete   Result Value Ref Range    LV A4C EF 58.4    POCT GLUCOSE   Result Value Ref Range    POCT Glucose 141 (H) 74 - 99 mg/dL       Assessment/Plan     Johnnie Rudolph is an 83 year old male with PMH HTN, HLD, A-fib not on anticoagulation, T2DM admitted for a stroke rule out. Neurology consulted, appreciate recs. Pending MRI/MRA readings.      Acute medical issues  #Stroke vs. TIA  - Patient s/p aspirin and clopidogrel load. Continuing baby aspirin and Plavix while inpatient  - Continuing atorvastatin 80 mg  - Echo notable for 55-60% EF. Negative bubble study  - Pending reads of MRI brain and MRA head and neck w/o contrast.   - , lipid panel unremarkable  - A1c pending  - Neurology consulted, appreciate recs  - PT/OT/MT eval and treat     # Atrial fibrillation  - Telemetry  - Anticoagulation held at this time due to DAPT therapy  - Consider restarting home metoprolol after definitive ischemic stroke rule out.   - Discussed case with family, family would like him to go home with Xarelto.     #Hypokalemia  - K+ 3.4 and repleted, replete as needed     Chronic medical issues  # HTN  - Hold home metoprolol 25mg  - Hold home lisinopril 20  - Hold home hydrochlorothiazide      # HLD  - Atorvastatin 80mg     # DM2 not on insulin  - Hold home oral agents  - Moderate sliding scale     Fluids: None  Electrolytes: Replete as needed  Nutrition: Diabetic diet  GI Ppx: None  DVT Ppx: None    Oxygen: None  Antibiotics: None     Dispo: 83 year old male admitted for stroke rule out. Pending MRI head and MRA head/neck read. Echo negative for bubble study. Patient has improvement of symptoms this morning than on admission, NIH score. Discussed case with patient's family about medications going forward with the pharmacy team, and family would like the patient to be on Xarelto. Anticipate discharge later today or tomorrow pending neuro recs and imaging reading.    Tirso Horn MD

## 2023-10-14 ENCOUNTER — DOCUMENTATION (OUTPATIENT)
Dept: NEUROLOGY | Facility: CLINIC | Age: 83
End: 2023-10-14
Payer: MEDICARE

## 2023-10-14 VITALS
HEART RATE: 81 BPM | RESPIRATION RATE: 18 BRPM | TEMPERATURE: 98.2 F | OXYGEN SATURATION: 94 % | WEIGHT: 215.61 LBS | HEIGHT: 73 IN | SYSTOLIC BLOOD PRESSURE: 156 MMHG | BODY MASS INDEX: 28.58 KG/M2 | DIASTOLIC BLOOD PRESSURE: 66 MMHG

## 2023-10-14 PROBLEM — R29.90 STROKE-LIKE SYMPTOMS: Status: RESOLVED | Noted: 2023-10-13 | Resolved: 2023-10-14

## 2023-10-14 LAB
ALBUMIN SERPL BCP-MCNC: 3.4 G/DL (ref 3.4–5)
ANION GAP SERPL CALC-SCNC: 14 MMOL/L (ref 10–20)
BUN SERPL-MCNC: 13 MG/DL (ref 6–23)
CALCIUM SERPL-MCNC: 8.5 MG/DL (ref 8.6–10.3)
CHLORIDE SERPL-SCNC: 105 MMOL/L (ref 98–107)
CO2 SERPL-SCNC: 24 MMOL/L (ref 21–32)
CREAT SERPL-MCNC: 0.65 MG/DL (ref 0.5–1.3)
GFR SERPL CREATININE-BSD FRML MDRD: >90 ML/MIN/1.73M*2
GLUCOSE BLD MANUAL STRIP-MCNC: 151 MG/DL (ref 74–99)
GLUCOSE BLD MANUAL STRIP-MCNC: 164 MG/DL (ref 74–99)
GLUCOSE SERPL-MCNC: 137 MG/DL (ref 74–99)
MAGNESIUM SERPL-MCNC: 1.59 MG/DL (ref 1.6–2.4)
PHOSPHATE SERPL-MCNC: 3.2 MG/DL (ref 2.5–4.9)
POTASSIUM SERPL-SCNC: 3.5 MMOL/L (ref 3.5–5.3)
SODIUM SERPL-SCNC: 139 MMOL/L (ref 136–145)

## 2023-10-14 PROCEDURE — 83735 ASSAY OF MAGNESIUM: CPT

## 2023-10-14 PROCEDURE — 2500000002 HC RX 250 W HCPCS SELF ADMINISTERED DRUGS (ALT 637 FOR MEDICARE OP, ALT 636 FOR OP/ED)

## 2023-10-14 PROCEDURE — 96372 THER/PROPH/DIAG INJ SC/IM: CPT

## 2023-10-14 PROCEDURE — 99239 HOSP IP/OBS DSCHRG MGMT >30: CPT

## 2023-10-14 PROCEDURE — 2500000004 HC RX 250 GENERAL PHARMACY W/ HCPCS (ALT 636 FOR OP/ED)

## 2023-10-14 PROCEDURE — 36415 COLL VENOUS BLD VENIPUNCTURE: CPT

## 2023-10-14 PROCEDURE — 80069 RENAL FUNCTION PANEL: CPT

## 2023-10-14 PROCEDURE — 2500000001 HC RX 250 WO HCPCS SELF ADMINISTERED DRUGS (ALT 637 FOR MEDICARE OP)

## 2023-10-14 PROCEDURE — 99233 SBSQ HOSP IP/OBS HIGH 50: CPT | Performed by: PSYCHIATRY & NEUROLOGY

## 2023-10-14 PROCEDURE — 82947 ASSAY GLUCOSE BLOOD QUANT: CPT

## 2023-10-14 RX ORDER — NAPROXEN SODIUM 220 MG/1
81 TABLET, FILM COATED ORAL DAILY
Qty: 30 TABLET | Refills: 1 | Status: SHIPPED | OUTPATIENT
Start: 2023-10-14 | End: 2023-11-08 | Stop reason: WASHOUT

## 2023-10-14 RX ORDER — MAGNESIUM SULFATE HEPTAHYDRATE 40 MG/ML
2 INJECTION, SOLUTION INTRAVENOUS ONCE
Status: COMPLETED | OUTPATIENT
Start: 2023-10-14 | End: 2023-10-14

## 2023-10-14 RX ORDER — CLOPIDOGREL BISULFATE 75 MG/1
75 TABLET ORAL DAILY
Qty: 30 TABLET | Refills: 0 | Status: SHIPPED | OUTPATIENT
Start: 2023-10-14 | End: 2023-11-08 | Stop reason: WASHOUT

## 2023-10-14 RX ORDER — ATORVASTATIN CALCIUM 80 MG/1
80 TABLET, FILM COATED ORAL NIGHTLY
Qty: 30 TABLET | Refills: 0 | Status: SHIPPED | OUTPATIENT
Start: 2023-10-14 | End: 2023-10-19 | Stop reason: SDUPTHER

## 2023-10-14 RX ADMIN — ASPIRIN 81 MG: 81 TABLET, COATED ORAL at 09:35

## 2023-10-14 RX ADMIN — MAGNESIUM SULFATE HEPTAHYDRATE 2 G: 2 INJECTION, SOLUTION INTRAVENOUS at 09:37

## 2023-10-14 RX ADMIN — INSULIN LISPRO 2 UNITS: 100 INJECTION, SOLUTION INTRAVENOUS; SUBCUTANEOUS at 11:47

## 2023-10-14 RX ADMIN — INSULIN LISPRO 2 UNITS: 100 INJECTION, SOLUTION INTRAVENOUS; SUBCUTANEOUS at 09:35

## 2023-10-14 RX ADMIN — HEPARIN SODIUM 5000 UNITS: 5000 INJECTION INTRAVENOUS; SUBCUTANEOUS at 06:01

## 2023-10-14 ASSESSMENT — COGNITIVE AND FUNCTIONAL STATUS - GENERAL
DAILY ACTIVITIY SCORE: 21
MOVING TO AND FROM BED TO CHAIR: A LITTLE
DRESSING REGULAR LOWER BODY CLOTHING: A LITTLE
CLIMB 3 TO 5 STEPS WITH RAILING: A LOT
MOBILITY SCORE: 18
STANDING UP FROM CHAIR USING ARMS: A LITTLE
HELP NEEDED FOR BATHING: A LITTLE
WALKING IN HOSPITAL ROOM: A LOT
TOILETING: A LITTLE

## 2023-10-14 ASSESSMENT — PAIN SCALES - GENERAL: PAINLEVEL_OUTOF10: 0 - NO PAIN

## 2023-10-14 NOTE — PROGRESS NOTES
This is a 83 year old man with stroke symptoms prior to admission.  MRI of the brain yesterday showed scattered acute ischemic strokes in the left MCA, with some petechial hemorrhage in the left parietal lobe.  MRA of the head and neck did not show significant stenosis, but motion limitation in the quality of interpretation.  Neurologically he is better, can walk to the bathroom.  I spoke with the patient's family at length about test results and recommendations: primarily he needs to be monitored closely due to his memory impairment in regard to taking his medications, he can be started on Eliquis, waiting one week  before starting it, and taking a baby aspirin and clopidogrel daily before starting the Eliquis, then stopping the antiplatelet agents.  He should follow up with Dr. Olvera within one week, and in our office within 3 weeks.

## 2023-10-14 NOTE — DISCHARGE INSTRUCTIONS
Dear Mr. Rudolph,    You were admitted to the hospital for a stroke. This handout has been provided to you for further information. We recommend the following additions to your medications, and pay attention to the timing of them.    1) START taking aspirin 81 mg daily  2) START taking Plavix (clopidogrel) 75 mg daily  3) I changed your home statin to a stronger statin, START taking atorvastatin 80 mg prior to sleep daily  4) START taking Xarelto 20 mg daily, HOWEVER: Do not start taking it until 1 week from today, so start at 10/21/2023    Continue taking your home medications as prescribed.  Make sure to follow up with Dr. Olvera within 1 week from discharge, and also make sure to follow up with our neurology team here with [facility] within 3 weeks for a post-hospital follow up.    Thank you for allowing us to participate in your care!

## 2023-10-14 NOTE — NURSING NOTE
Discharge instructions reviewed with patient. No questions at this time. Education given for new homegoing medications with type, uses, and most common side effects. Stroke booklet given, reviewed s/s of stroke with patient and his wife. Patient verbalized understanding. Discharged home in stable condition.

## 2023-10-14 NOTE — CARE PLAN
The patient's goals for the shift include getting discharged.    The clinical goals for the shift include showing signs of improvement.    Over the shift, the patient did make progress toward the following goals. Pt showed signs of improvement with no signs of facial droop and equal bilateral strengths. Pt will be discharged today. Boris Arriaza RN

## 2023-10-14 NOTE — CARE PLAN
The patient's goals for the shift include      The clinical goals for the shift include   Problem: General Stroke  Goal: Demonstrate improvement in neurological exam throughout the shift  Outcome: Progressing  Goal: Maintain BP within ordered limits throughout shift  Outcome: Progressing  Goal: Participate in treatment (ie., meds, therapy) throughout shift  Outcome: Progressing  Goal: No symptoms of aspiration throughout shift  Outcome: Progressing  Goal: No symptoms of hemorrhage throughout shift  Outcome: Progressing  Goal: Tolerate enteral feeding throughout shift  Outcome: Progressing  Goal: Decreased nausea/vomiting throughout shift  Outcome: Progressing  Goal: Controlled blood glucose throughout shift  Outcome: Progressing  Goal: Out of bed three times today  Outcome: Progressing       Pt had uneventful shift.  No negative change in neuro checks, pt was started on subcutaneous heparin this shift, he remained safe and participated in treatments.  Pt ambulated to bathroom multiple times with staff assistance and a walker.

## 2023-10-14 NOTE — CARE PLAN
Reviewed results of MRI brain and MRA head neck.   Bedside exam completed at 9:40 PM 10/13/23 with NIHSS of 0. Pt is A&O x3. He denied HA, further weakness, sensation changes or vision changes.     Called transfer center for further recommendations. Spoke with neurosurgery on call who said there is no neurosurgical intervention currently needed. Spoke and discussed case with on call stroke service attending Dr. Archer who reviewed images and recommended switching to one antiplatelet + dvt ppx.     Will switch to asa 81mg daily + subcutaneous heparin for now (hold Plavix) pending any further recs from neuro service.

## 2023-10-14 NOTE — DISCHARGE SUMMARY
Discharge Diagnosis  Acute ischemic stroke with minimal petechial hemorrhage    Issues Requiring Follow-Up  Post hospital follow up in one week with Dr. Olvera  Neurology follow up within 3 weeks with Izzy Juarez PA-C    Discharge Meds     Your medication list        START taking these medications        Instructions Last Dose Given Next Dose Due   aspirin 81 mg chewable tablet      Chew 1 tablet (81 mg) once daily.       atorvastatin 80 mg tablet  Commonly known as: Lipitor      Take 1 tablet (80 mg) by mouth once daily at bedtime.       clopidogrel 75 mg tablet  Commonly known as: Plavix      Take 1 tablet (75 mg) by mouth once daily.       rivaroxaban 10 mg tablet  Commonly known as: Xarelto  Start taking on: October 21, 2023      Take 2 tablets (20 mg) by mouth once daily. Do not start before October 21, 2023.              CONTINUE taking these medications        Instructions Last Dose Given Next Dose Due   cholecalciferol 50 mcg (2,000 unit) capsule  Commonly known as: Vitamin D-3           cyanocobalamin 1,000 mcg/mL injection  Commonly known as: Vitamin B-12           glipiZIDE 5 mg tablet  Commonly known as: Glucotrol      Take 1 tablet (5 mg) by mouth 2 times a day.       ketoconazole 2 % cream  Commonly known as: NIZOral           lisinopriL-hydrochlorothiazide 20-12.5 mg tablet      Take 1 tablet by mouth once daily.       metFORMIN 1,000 mg tablet  Commonly known as: Glucophage      Take 1 tablet (1,000 mg) by mouth 2 times a day with meals.       metoprolol tartrate 25 mg tablet  Commonly known as: Lopressor      Take 1 tablet (25 mg) by mouth 2 times a day.       OneTouch Ultra Test strip  Generic drug: blood sugar diagnostic                  STOP taking these medications      simvastatin 20 mg tablet  Commonly known as: Zocor                  Where to Get Your Medications        These medications were sent to [id number] [address] [facility]  [address] [facility]  [place] [address]      Phone: [phone]   aspirin 81 mg chewable tablet  atorvastatin 80 mg tablet  clopidogrel 75 mg tablet  rivaroxaban 10 mg tablet         Test Results Pending At Discharge  Pending Labs       Order Current Status    BLOOD GAS VENOUS FULL PANEL In process            Hospital Course  Johnnie Rudolph is an 83 year old male with PMH of HTN, HLD, A-fib not on anticoagulation, and T2DM who presented to the ED with stroke-like symptoms. Patient reported having a headache on the night prior to admission, but took a Motrin with resolution of symptoms. On the morning of admission, the patient has had worsening gait, and knocked over several glasses at breakfast. Later on in the afternoon, his wife noticed that he's had unilateral facial droop, drooling, aphasia, and dysarthria. Upon arrival to the ED, the patient received a CT head w/o contrast which did not show an acute brain bleed. He was loaded with aspirin, Plavix, started on atorvastatin 80, and held home anti-hypertensive medications due to permissive HTN. Neuro consulted. Echo ordered, negative bubble study, EF 55-60%. A1c 7.1%. MRI head and MRA Head/neck w/o contrast ordered which showed scattered foci of hyperintensity in the left MCA suggesting pseudonormalization or subacute infarcts with minimal petechial hemorrhage, and MRA did not show any significant stenosis. Held lengthy discussion with pharmacy team about medications going forward, and the family is agreeable to starting the patient on Xarelto and aspirin. Discussed with neurology about outpatient plan going forward, neurology states that the patient had acute ischemic strokes in the left MCA territory with minimal petechial hemorrhage, and they recommend delaying starting Xarelto for 7 days. In the meantime, the patient should start taking aspirin 81 mg and Plavix 75 mg daily. He will also start taking a statin. Patient will follow up with PCP in 7 days and follow up with neurology within 3  weeks. On the morning of discharge, the patient was able to ambulate by himself using a walker without the assistance of others. Patient will be discharged home in stable condition.       Pertinent Physical Exam At Time of Discharge  Physical Exam  Constitutional:       General: He is not in acute distress.     Appearance: Normal appearance. He is not ill-appearing or diaphoretic.   Cardiovascular:      Rate and Rhythm: Normal rate. Rhythm irregular.      Pulses: Normal pulses.      Heart sounds: Normal heart sounds. No murmur heard.  Pulmonary:      Effort: Pulmonary effort is normal. No respiratory distress.      Breath sounds: Normal breath sounds. No wheezing.   Chest:      Chest wall: No tenderness.   Abdominal:      General: Abdomen is flat. Bowel sounds are normal. There is no distension.      Palpations: Abdomen is soft.      Tenderness: There is no abdominal tenderness.   Musculoskeletal:      Right lower leg: No edema.      Left lower leg: No edema.   Neurological:      General: No focal deficit present.      Mental Status: He is alert and oriented to person, place, and time. Mental status is at baseline.      Motor: Weakness (Improved from admission) present.      Comments: NIH score 0 at discharge. Patient able to ambulate by himself with a walker         Outpatient Follow-Up  Future Appointments   Date Time Provider Department Center   10/25/2023  8:00 AM [id number]         Tirso Horn MD

## 2023-10-16 ENCOUNTER — PATIENT OUTREACH (OUTPATIENT)
Dept: CARE COORDINATION | Facility: CLINIC | Age: 83
End: 2023-10-16
Payer: MEDICARE

## 2023-10-16 NOTE — PROGRESS NOTES
Outreach call to patient to support a smooth transition of care from recent admission.  Spoke with patient, reviewed discharge medications, discharge instructions, assessed social needs, and provided education on importance of follow-up appointment with provider.  Will continue to monitor through transition period.    CM spoke with patient and wife.  Patient is doing better. The facial droop, drooling, aphasia, and abnormal gait has resolved.    PCP 10/19/2023  Wife states, she still has to call and schedule an appointment with the neurologist.    Engagement  Call Start Time: 1147 (10/16/2023 11:43 AM)    Medications  Medications reviewed with patient/caregiver?: Yes (10/16/2023 11:43 AM)  Is the patient having any side effects they believe may be caused by any medication additions or changes?: No (10/16/2023 11:43 AM)  Does the patient have all medications ordered at discharge?: No (Patient does not have Xarelto. Patient states, the pharmacy won't fill it until the start date. Start date is 10/21/2023. Patient has all of the other new medications started at discharge.) (10/16/2023 11:43 AM)  Care Management Interventions: No intervention needed (10/16/2023 11:43 AM)  Is the patient taking all medications as directed (includes completed medication regime)?: Yes (10/16/2023 11:43 AM)    Appointments  Does the patient have a primary care provider?: Yes (Scheduled visit 10/19/2023) (10/16/2023 11:43 AM)  Care Management Interventions: Verified appointment date/time/provider (10/16/2023 11:43 AM)    Self Management  What is the home health agency?: not applicable (10/16/2023 11:43 AM)  What Durable Medical Equipment (DME) was ordered?: not applicable (10/16/2023 11:43 AM)    Patient Teaching  Does the patient have access to their discharge instructions?: Yes (10/16/2023 11:43 AM)  Care Management Interventions: Reviewed instructions with patient (10/16/2023 11:43 AM)  What is the patient's perception of their health  status since discharge?: Improving (10/16/2023 11:43 AM)  Is the patient/caregiver able to teach back the hierarchy of who to call/visit for symptoms/problems? PCP, Specialist, Home Health nurse, Urgent Care, ED, 911: Yes (10/16/2023 11:43 AM)    Tosha Saldana RN/ALFREDO  [phone]

## 2023-10-19 ENCOUNTER — OFFICE VISIT (OUTPATIENT)
Dept: PRIMARY CARE | Facility: CLINIC | Age: 83
End: 2023-10-19
Payer: MEDICARE

## 2023-10-19 VITALS
BODY MASS INDEX: 27.57 KG/M2 | DIASTOLIC BLOOD PRESSURE: 82 MMHG | HEART RATE: 52 BPM | SYSTOLIC BLOOD PRESSURE: 138 MMHG | OXYGEN SATURATION: 92 % | WEIGHT: 209 LBS

## 2023-10-19 DIAGNOSIS — E11.65 TYPE 2 DIABETES MELLITUS WITH HYPERGLYCEMIA, WITHOUT LONG-TERM CURRENT USE OF INSULIN (MULTI): ICD-10-CM

## 2023-10-19 DIAGNOSIS — R29.90 STROKE-LIKE SYMPTOMS: ICD-10-CM

## 2023-10-19 DIAGNOSIS — E53.8 VITAMIN B12 DEFICIENCY: ICD-10-CM

## 2023-10-19 DIAGNOSIS — I10 BENIGN ESSENTIAL HYPERTENSION: ICD-10-CM

## 2023-10-19 DIAGNOSIS — I48.19 PERSISTENT ATRIAL FIBRILLATION (MULTI): Primary | ICD-10-CM

## 2023-10-19 PROCEDURE — 96372 THER/PROPH/DIAG INJ SC/IM: CPT | Performed by: FAMILY MEDICINE

## 2023-10-19 PROCEDURE — 1036F TOBACCO NON-USER: CPT | Performed by: FAMILY MEDICINE

## 2023-10-19 PROCEDURE — 99214 OFFICE O/P EST MOD 30 MIN: CPT | Performed by: FAMILY MEDICINE

## 2023-10-19 PROCEDURE — 3079F DIAST BP 80-89 MM HG: CPT | Performed by: FAMILY MEDICINE

## 2023-10-19 PROCEDURE — 1159F MED LIST DOCD IN RCRD: CPT | Performed by: FAMILY MEDICINE

## 2023-10-19 PROCEDURE — 1160F RVW MEDS BY RX/DR IN RCRD: CPT | Performed by: FAMILY MEDICINE

## 2023-10-19 PROCEDURE — 1126F AMNT PAIN NOTED NONE PRSNT: CPT | Performed by: FAMILY MEDICINE

## 2023-10-19 PROCEDURE — 1111F DSCHRG MED/CURRENT MED MERGE: CPT | Performed by: FAMILY MEDICINE

## 2023-10-19 PROCEDURE — 3075F SYST BP GE 130 - 139MM HG: CPT | Performed by: FAMILY MEDICINE

## 2023-10-19 RX ORDER — CYANOCOBALAMIN 1000 UG/ML
1000 INJECTION, SOLUTION INTRAMUSCULAR; SUBCUTANEOUS
Status: DISCONTINUED | OUTPATIENT
Start: 2023-10-20 | End: 2024-02-11 | Stop reason: WASHOUT

## 2023-10-19 RX ORDER — LISINOPRIL AND HYDROCHLOROTHIAZIDE 12.5; 2 MG/1; MG/1
1 TABLET ORAL DAILY
Qty: 90 TABLET | Refills: 1 | Status: SHIPPED | OUTPATIENT
Start: 2023-10-19 | End: 2024-02-28

## 2023-10-19 RX ORDER — ATORVASTATIN CALCIUM 80 MG/1
80 TABLET, FILM COATED ORAL NIGHTLY
Qty: 90 TABLET | Refills: 3 | Status: SHIPPED | OUTPATIENT
Start: 2023-10-19 | End: 2023-11-08 | Stop reason: SDUPTHER

## 2023-10-19 RX ADMIN — CYANOCOBALAMIN 1000 MCG: 1000 INJECTION, SOLUTION INTRAMUSCULAR; SUBCUTANEOUS at 12:09

## 2023-10-19 NOTE — PROGRESS NOTES
Subjective   Patient ID: Johnnie Rudolph is a 83 y.o. male who presents for Hospital Follow-up (In [facility] with stroke, per pt feels good).    HPI   Onset   Thursday PM fell in bathroom  Hard to get up  Drooling hard to speak  Did not know name significant other  Squad called   [facility] small CVA  Back to baseline per patient and wife/significant other   Patient has been driving all along prior to this    Review of Systems    Objective   /82   Pulse 52   Wt 94.8 kg (209 lb)   SpO2 92%   BMI 27.57 kg/m²     Physical Exam  Patient alert NAD HEENT is negative neck supple  Lungs clear  Hearts irregular regular  Abdomen soft  1+ edema in legs    Assessment/Plan   Problem List Items Addressed This Visit             ICD-10-CM    Benign essential hypertension I10    Relevant Medications    lisinopriL-hydrochlorothiazide 20-12.5 mg tablet    atorvastatin (Lipitor) 80 mg tablet    Persistent atrial fibrillation (CMS/[facility]) - Primary I48.19    Type 2 diabetes mellitus with hyperglycemia, without long-term current use of insulin (CMS/[facility]) E11.65    Relevant Medications    atorvastatin (Lipitor) 80 mg tablet    Vitamin B12 deficiency E53.8    Relevant Medications    cyanocobalamin (Vitamin B-12) injection 1,000 mcg     Other Visit Diagnoses         Codes    Stroke-like symptoms     R29.90    Relevant Medications    atorvastatin (Lipitor) 80 mg tablet          Reviewed patient's CT MRIs MRAs.  Patient in chronic atrial fibrillation initially declined anticoagulation mainly because of cost or being on Coumadin.  He agreed to take Xarelto and will start the prescription on Saturday   That time we will discontinue aspirin and Plavix per neurology/antiplatelet agents.  Patient diabetic control has been good hemoglobin A1c   Patient inquired regarding driving he has been driving all along although I do feel he should warrant approval from neurology they took care of him in the hospital.  Driving evaluation may be necessary.  He denies  any accidents and both patient and significant other feel he is back to baseline.  See if neurology feels he has any significant deficit remaining from recent small stroke

## 2023-10-23 PROCEDURE — 90662 IIV NO PRSV INCREASED AG IM: CPT | Performed by: FAMILY MEDICINE

## 2023-10-23 PROCEDURE — G0008 ADMIN INFLUENZA VIRUS VAC: HCPCS | Performed by: FAMILY MEDICINE

## 2023-10-24 DIAGNOSIS — I63.9 CEREBROVASCULAR ACCIDENT (CVA), UNSPECIFIED MECHANISM (MULTI): ICD-10-CM

## 2023-10-24 DIAGNOSIS — E11.65 TYPE 2 DIABETES MELLITUS WITH HYPERGLYCEMIA, WITHOUT LONG-TERM CURRENT USE OF INSULIN (MULTI): Primary | ICD-10-CM

## 2023-11-03 ENCOUNTER — PATIENT OUTREACH (OUTPATIENT)
Dept: CARE COORDINATION | Facility: CLINIC | Age: 83
End: 2023-11-03
Payer: MEDICARE

## 2023-11-03 NOTE — PROGRESS NOTES
Outreach call to patient following up on appointment with primary care provider.  Discussed appointment, reviewed medications, and discussed plan of care.  Patient with no additional questions at this time.  Will continue to follow.    Tosha Saldana RN

## 2023-11-08 ENCOUNTER — OFFICE VISIT (OUTPATIENT)
Dept: NEUROLOGY | Facility: CLINIC | Age: 83
End: 2023-11-08
Payer: MEDICARE

## 2023-11-08 VITALS
HEIGHT: 73 IN | BODY MASS INDEX: 27.49 KG/M2 | WEIGHT: 207.4 LBS | DIASTOLIC BLOOD PRESSURE: 68 MMHG | SYSTOLIC BLOOD PRESSURE: 114 MMHG | HEART RATE: 82 BPM

## 2023-11-08 DIAGNOSIS — I10 BENIGN ESSENTIAL HYPERTENSION: ICD-10-CM

## 2023-11-08 DIAGNOSIS — I48.19 PERSISTENT ATRIAL FIBRILLATION (MULTI): ICD-10-CM

## 2023-11-08 DIAGNOSIS — R29.90 STROKE-LIKE SYMPTOMS: ICD-10-CM

## 2023-11-08 DIAGNOSIS — E11.65 TYPE 2 DIABETES MELLITUS WITH HYPERGLYCEMIA, WITHOUT LONG-TERM CURRENT USE OF INSULIN (MULTI): ICD-10-CM

## 2023-11-08 PROBLEM — I63.412 CEREBROVASCULAR ACCIDENT (CVA) DUE TO EMBOLISM OF LEFT MIDDLE CEREBRAL ARTERY (MULTI): Status: ACTIVE | Noted: 2023-11-08

## 2023-11-08 PROCEDURE — 1126F AMNT PAIN NOTED NONE PRSNT: CPT

## 2023-11-08 PROCEDURE — 99214 OFFICE O/P EST MOD 30 MIN: CPT

## 2023-11-08 PROCEDURE — 1160F RVW MEDS BY RX/DR IN RCRD: CPT

## 2023-11-08 PROCEDURE — 3074F SYST BP LT 130 MM HG: CPT

## 2023-11-08 PROCEDURE — 1111F DSCHRG MED/CURRENT MED MERGE: CPT

## 2023-11-08 PROCEDURE — 3078F DIAST BP <80 MM HG: CPT

## 2023-11-08 PROCEDURE — 1159F MED LIST DOCD IN RCRD: CPT

## 2023-11-08 PROCEDURE — 1036F TOBACCO NON-USER: CPT

## 2023-11-08 RX ORDER — ATORVASTATIN CALCIUM 80 MG/1
80 TABLET, FILM COATED ORAL NIGHTLY
Qty: 90 TABLET | Refills: 3 | Status: SHIPPED | OUTPATIENT
Start: 2023-11-08 | End: 2024-02-28

## 2023-11-08 ASSESSMENT — VISUAL ACUITY: VA_NORMAL: 1

## 2023-11-08 NOTE — PROGRESS NOTES
Subjective     Johnnie Rudolph is a  83 y.o. male with a past medical history of PD, memory loss, neuropathy, HTN, HLD, cataracts, glaucoma, DM II, a fib, who presents with No chief complaint on file.  . Patient is accompanied by: spouse.    Visit type: follow up visit    HPI   Pt presents today for hospital follow up.   To review:  Pt presented to the ED 10/12 with aphasia and dysarthria as well as headache. LKW the night prior at 2200. NIH decreased from 6 to 4. CT negative. Outside the window for TNK and thrombectomy. Motrin improved his headache. Family also noted gait was off as well as ataxia. Dr. Schrader saw patient and noted he was a poor historian and hard of hearing.  Found to have scattered ischemic strokes in the L MCA with some petechial hemorrhage in L parietal lobe. MRA head and neck without significant stenosis. Dr. Schrader recommended discharge on DAPT for 10 days then stop and start anticoagulation.        Date: 10/11/23  Subtype: ischemic   Etiology: likely a fib.    Location: L MCA   MRI: as above   Vessel imaging: limited to motion but no significant stenosis or occlusion noted    Echo: EF 55-50%, LA normal, negative bubble study   Anticoagulation:  Xarelto ; Denies side effects including excessive bleeding and bruising.   Statin: lipitor 80 ; Denies side effects  Risk factors: HTN, HLD, DM , A fib    Since then, pt. has been doing well. Not noting any residual deficits. Taking the Xarelto. Feeling tired and requiring a nap during the day.     Therapy/Rehab:  none   Mood: normal    Sleep: has trouble falling back asleep after getting up to use the bathroom  Diet: good   Exercise: work around the house and land   Work: retired  and truck driverf  Driving: yes   Tobacco/alcohol/illicit drug use:      Review of Systems  10 point review of systems performed and is negative except as noted in the HPI.     All other systems have been reviewed and are negative for complaint.    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    Nontraumatic chronic subdural hemorrhage    Other cranial cerebrospinal fluid leak     Subdural hygroma    Other fecal abnormalities 05/15/2017    Occult blood positive stool    Other symptoms and signs involving cognitive functions and awareness 08/03/2015    Cognitive impairment    Pain in left shoulder 08/08/2016    Acute pain of left shoulder    Personal history of other diseases of the musculoskeletal system and connective tissue 11/04/2014    History of back pain    Postconcussional syndrome 08/03/2015    Post concussion syndrome       No relevant family history has been documented for this patient.    Past Surgical History:   Procedure Laterality Date    HERNIA REPAIR  06/02/2014    Hernia Repair    KNEE SURGERY  06/02/2014    Knee Surgery    MR HEAD ANGIO WO IV CONTRAST  10/13/2023    MR HEAD ANGIO WO IV CONTRAST 10/13/2023 [facility]    MR NECK ANGIO WO IV CONTRAST  10/13/2023    MR NECK ANGIO WO IV CONTRAST 10/13/2023 [facility]    OTHER SURGICAL HISTORY  03/25/2022    Inguinal hernia repair laparoscopic       Social History     Substance and Sexual Activity   Drug Use Never     Tobacco Use: Low Risk  (11/8/2023)    Patient History     Smoking Tobacco Use: Never     Smokeless Tobacco Use: Never     Passive Exposure: Not on file     Alcohol Use: Not At Risk (10/13/2023)    AUDIT-C      Frequency of Alcohol Consumption: Never     Average Number of Drinks: Patient does not drink     Frequency of Binge Drinking: Never           Objective     Neurological Exam  Mental Status  Awake, alert and oriented to person, place and time. Oriented to person, place, time and situation. Recent and remote memory are intact. Mild dysarthria present. Language is fluent with no aphasia. Attention and concentration are normal. Fund of knowledge is appropriate for level of education.    Cranial Nerves  CN II: Visual acuity is normal. Visual fields full to confrontation.  CN III, IV, VI: Extraocular movements intact bilaterally. Normal lids and orbits bilaterally. Pupils equal round and reactive to light bilaterally.  CN V: Facial sensation is normal.  CN VII: Full and symmetric facial movement.  CN VIII: Hearing is normal.  CN IX, X: Palate elevates symmetrically. Normal gag reflex.  CN XI: Shoulder shrug strength is normal.  CN XII: Tongue midline without atrophy or fasciculations.    Motor  Normal muscle bulk throughout. No fasciculations present. Normal muscle tone. No abnormal involuntary movements. Strength is 5/5 in all four extremities except as noted.    Sensory  Light touch is normal in upper and lower extremities. Pinprick is normal in upper and lower extremities. Temperature is normal in upper and lower extremities. Vibration is normal in upper and lower extremities. Proprioception is normal in upper and lower extremities.     Reflexes                                            Right                      Left  Brachioradialis                    2+                         2+  Biceps                                 2+                         2+  Patellar                                2+                         2+  Achilles                                2+                         2+  Right Plantar: downgoing  Left Plantar: downgoing    Coordination  Right: Finger-to-nose normal. Rapid alternating movement  normal. Heel-to-shin normal.Left: Finger-to-nose normal. Rapid alternating movement normal. Heel-to-shin normal.    Gait    Walking with cane.           Results for orders placed or performed during the hospital encounter of 10/12/23   MR brain wo IV contrast    Narrative    Interpreted By:  Cali Johnson,   STUDY:  MR BRAIN WO IV CONTRAST; MR ANGIO HEAD WO IV CONTRAST; MR ANGIO NECK  WO IV CONTRAST;  10/13/2023 1:26 pm      INDICATION:  Signs/Symptoms:Acute ischemic stroke.      COMPARISON:  10/12/2023.      ACCESSION NUMBER(S):  [id number]; [id number]; [id number]      ORDERING CLINICIAN:  YNES FLEMING      TECHNIQUE:  Axial T2, FLAIR, DWI, gradient echo T2 and sagittal and coronal T1  weighted images of brain were acquired.  Axial time-of-flight MRA  through the hnfewk-cg-Eaowfl and midportion of the neck with maximum  intensity projected images.      FINDINGS:  Findings head MRI:  Evaluation is limited due to lack of intravenous contrast. Moderate  number of scattered foci of restricted diffusion within the lateral  aspect of the left cerebral hemisphere within the left middle  cerebral artery territory associated with FLAIR hyperintensity with  equivocal signal on ADC map. Focal increased susceptibility in the  region of foci in the left parietal region may reflect susceptibility  due to blood products. Moderate global parenchymal volume loss with  consequent ex vacuo prominence of the ventricular system sulci and  cisterns. Minimal nonspecific periventricular T2 FLAIR hyperintensity  may reflect chronic small vessel ischemic disease given the age of  the patient. No mass effect or midline shift. Visualized paranasal  sinuses and mastoid air cells are clear. Patient is status post  bilateral cataract extraction.      Findings MRA emanwx-tc-Ajxbvi: Evaluation is minimally to moderately  limited due to patient motion. Distal internal carotid arteries are  patent. Proximal anterior and middle cerebral arteries  are patent  without definite hemodynamically significant stenosis or other  abnormality identified. The distal vertebral arteries are patent and  join appropriately to form the basilar artery which gives rise to the  right posterior cerebral artery which is proximally patent. The left  posterior cerebral artery arises via a prominent left posterior  communicating artery, so-called fetal type variant.      Findings MRA neck:  Evaluation is moderately limited due to patient motion. Visualized  common carotid arteries are patent. Carotid bifurcations are patent.  Visualized cervical internal carotid arteries are patent. Vertebral  arteries appear to be patent in the region of the neck with the left  side being dominant.        Impression    Moderate number of scattered foci of hyperintensity on B 1000  diffusion-weighted imaging in the left middle cerebral artery  territory with equivocal signal on ADC map suggesting potential  pseudonormalization/subacute infarcts. Elevated susceptibility in the  region of some of these foci in the left parietal area could reflect  minimal petechial hemorrhage. MRA evaluation was limited due to  patient motion without hemodynamically significant stenosis  identified. Please correlate clinically.      MACRO:  Critical Finding:  See findings. Notification was initiated on  10/13/2023 at 6:42 pm by  Cali Johnson.  (**-OCF-**) Instructions:      Signed by: Cali Johnson 10/13/2023 6:42 PM  Dictation workstation:   [id number]   MR angio neck wo IV contrast    Narrative    Interpreted By:  Cali Johnson,   STUDY:  MR BRAIN WO IV CONTRAST; MR ANGIO HEAD WO IV CONTRAST; MR ANGIO NECK  WO IV CONTRAST;  10/13/2023 1:26 pm      INDICATION:  Signs/Symptoms:Acute ischemic stroke.      COMPARISON:  10/12/2023.      ACCESSION NUMBER(S):  [id number]; [id number]; [id number]      ORDERING CLINICIAN:  YNES FLEMING      TECHNIQUE:  Axial T2, FLAIR, DWI, gradient echo T2 and sagittal and coronal  T1  weighted images of brain were acquired.  Axial time-of-flight MRA  through the zjrgqo-vs-Slcnzu and midportion of the neck with maximum  intensity projected images.      FINDINGS:  Findings head MRI:  Evaluation is limited due to lack of intravenous contrast. Moderate  number of scattered foci of restricted diffusion within the lateral  aspect of the left cerebral hemisphere within the left middle  cerebral artery territory associated with FLAIR hyperintensity with  equivocal signal on ADC map. Focal increased susceptibility in the  region of foci in the left parietal region may reflect susceptibility  due to blood products. Moderate global parenchymal volume loss with  consequent ex vacuo prominence of the ventricular system sulci and  cisterns. Minimal nonspecific periventricular T2 FLAIR hyperintensity  may reflect chronic small vessel ischemic disease given the age of  the patient. No mass effect or midline shift. Visualized paranasal  sinuses and mastoid air cells are clear. Patient is status post  bilateral cataract extraction.      Findings MRA zenwft-gy-Shqsma: Evaluation is minimally to moderately  limited due to patient motion. Distal internal carotid arteries are  patent. Proximal anterior and middle cerebral arteries are patent  without definite hemodynamically significant stenosis or other  abnormality identified. The distal vertebral arteries are patent and  join appropriately to form the basilar artery which gives rise to the  right posterior cerebral artery which is proximally patent. The left  posterior cerebral artery arises via a prominent left posterior  communicating artery, so-called fetal type variant.      Findings MRA neck:  Evaluation is moderately limited due to patient motion. Visualized  common carotid arteries are patent. Carotid bifurcations are patent.  Visualized cervical internal carotid arteries are patent. Vertebral  arteries appear to be patent in the region of the neck with  the left  side being dominant.        Impression    Moderate number of scattered foci of hyperintensity on B 1000  diffusion-weighted imaging in the left middle cerebral artery  territory with equivocal signal on ADC map suggesting potential  pseudonormalization/subacute infarcts. Elevated susceptibility in the  region of some of these foci in the left parietal area could reflect  minimal petechial hemorrhage. MRA evaluation was limited due to  patient motion without hemodynamically significant stenosis  identified. Please correlate clinically.      MACRO:  Critical Finding:  See findings. Notification was initiated on  10/13/2023 at 6:42 pm by  Cali Johnson.  (**-OCF-**) Instructions:      Signed by: Cali Johnson 10/13/2023 6:42 PM  Dictation workstation:   [id number]   MR angio head wo IV contrast    Narrative    Interpreted By:  Cali Johnson,   STUDY:  MR BRAIN WO IV CONTRAST; MR ANGIO HEAD WO IV CONTRAST; MR ANGIO NECK  WO IV CONTRAST;  10/13/2023 1:26 pm      INDICATION:  Signs/Symptoms:Acute ischemic stroke.      COMPARISON:  10/12/2023.      ACCESSION NUMBER(S):  [id number]; [id number]; [id number]      ORDERING CLINICIAN:  YNES FLEMING      TECHNIQUE:  Axial T2, FLAIR, DWI, gradient echo T2 and sagittal and coronal T1  weighted images of brain were acquired.  Axial time-of-flight MRA  through the alqhgh-an-Arxdid and midportion of the neck with maximum  intensity projected images.      FINDINGS:  Findings head MRI:  Evaluation is limited due to lack of intravenous contrast. Moderate  number of scattered foci of restricted diffusion within the lateral  aspect of the left cerebral hemisphere within the left middle  cerebral artery territory associated with FLAIR hyperintensity with  equivocal signal on ADC map. Focal increased susceptibility in the  region of foci in the left parietal region may reflect susceptibility  due to blood products. Moderate global parenchymal volume loss with  consequent ex vacuo  prominence of the ventricular system sulci and  cisterns. Minimal nonspecific periventricular T2 FLAIR hyperintensity  may reflect chronic small vessel ischemic disease given the age of  the patient. No mass effect or midline shift. Visualized paranasal  sinuses and mastoid air cells are clear. Patient is status post  bilateral cataract extraction.      Findings MRA ouilgf-hl-Scybla: Evaluation is minimally to moderately  limited due to patient motion. Distal internal carotid arteries are  patent. Proximal anterior and middle cerebral arteries are patent  without definite hemodynamically significant stenosis or other  abnormality identified. The distal vertebral arteries are patent and  join appropriately to form the basilar artery which gives rise to the  right posterior cerebral artery which is proximally patent. The left  posterior cerebral artery arises via a prominent left posterior  communicating artery, so-called fetal type variant.      Findings MRA neck:  Evaluation is moderately limited due to patient motion. Visualized  common carotid arteries are patent. Carotid bifurcations are patent.  Visualized cervical internal carotid arteries are patent. Vertebral  arteries appear to be patent in the region of the neck with the left  side being dominant.        Impression    Moderate number of scattered foci of hyperintensity on B 1000  diffusion-weighted imaging in the left middle cerebral artery  territory with equivocal signal on ADC map suggesting potential  pseudonormalization/subacute infarcts. Elevated susceptibility in the  region of some of these foci in the left parietal area could reflect  minimal petechial hemorrhage. MRA evaluation was limited due to  patient motion without hemodynamically significant stenosis  identified. Please correlate clinically.      MACRO:  Critical Finding:  See findings. Notification was initiated on  10/13/2023 at 6:42 pm by  Cali Johnson.  (**-OCF-**) Instructions:      Signed  by: Cali Johnson 10/13/2023 6:42 PM  Dictation workstation:   [id number]   CT head wo IV contrast    Narrative    Interpreted By:  Estevan Gutiérrez,   STUDY:  CT HEAD WO IV CONTRAST;  10/12/2023 8:43 pm      INDICATION:  Aphasia, dysarthria and facial droop greater than 24 hours since last  known well.      COMPARISON:  1/27/2015      ACCESSION NUMBER(S):  [id number]      ORDERING CLINICIAN:  MARTIN MENESES      TECHNIQUE:  Contiguous axial images of the head were obtained without intravenous  contrast.      FINDINGS:  The examination is limited secondary to patient motion.      BRAIN PARENCHYMA:  There is cerebral atrophy and chronic  periventricular white matter small vessel ischemic change. The gray  white matter differentiation is preserved.  No mass effect or midline  shift.      HEMORRHAGE:  No evidence of acute intracranial hemorrhage.  VENTRICLES AND EXTRA-AXIAL SPACES:  The ventricles are within normal  limits in size for brain volume.  No evidence of abnormal extraaxial  fluid collection. EXTRACRANIAL SOFT TISSUES:  Within normal limits.  PARANASAL SINUSES/MASTOIDS:  The visualized paranasal sinuses and  mastoid air cells are clear and well pneumatized. CALVARIUM:  No  evidence of depressed calvarial fracture.      OTHER FINDINGS:  None        Impression    Cerebral atrophy and chronic periventricular white matter small  vessel ischemic change.      No evidence of acute intracranial hemorrhage.      If there is persist concern for acute ischemia or intracranial  process, MRI may be obtained for further evaluation as clinically  indicated.              MACRO:  None      Signed by: Estevan Gutiérrez 10/12/2023 9:31 PM  Dictation workstation:   [id number]   Results for orders placed or performed in visit on 01/27/15   CT HEAD W AND WO IV CONTRAST    Narrative    EXAMINATION:  CT of head without and with contrast     CLINICAL HISTORY:  Signs/Symptoms: s/p fall was referred to neurosurg for eval.   bilateral  subdural hygromas, eval for change     COMPARISON:  None     TECHNIQUE:  Contiguous axial CT images were obtained  through the head at 3 mm   slice thickness prior to and following intravenous contrast   administration.    50 cc of Optiray 320 contrast was administered.     FINDINGS:  The ventricles, sulci and cisterns are within normal limits.  The   gray white junction is preserved.  There is no evidence of hemorrhage   or fracture.  There is no enhancing mass or mass effect identified.   No abnormal intracranial fluid collections identified.  The   visualized paranasal sinuses are clear.         IMPRESSION:  1: No evidence of acute hemorrhage or infarct.        2. No enhancing mass or mass effect identified.     This study was interpreted and dictated at [facility] in [place], [place]   Results for orders placed or performed in visit on 11/04/14   XR LUMBAR SPINE COMPLETE 4+ VIEWS    Narrative    X-ray lumbar spine: 5 views.     Clinical: Back pain     Findings:      Endplate sclerosis and spur formation is seen throughout the lumbar   spine. Narrowing of L4-L5 disc space is seen. Facet hypertrophy is   present.     No fractures or dislocations are seen.      Impression:     Degenerative changes of the lumbar spine.   XR THORACIC SPINE 3 VIEWS    Narrative    X-ray thoracic spine: 3 views.     Clinical: Pain. Prostate cancer     Findings:      Endplate sclerosis and spur formation is seen throughout the thoracic   spine. Mild disc space narrowing is seen throughout the thoracic   spine.     No fractures or dislocations are seen.      Impression:     Degenerative changes of the thoracic spine.              Assessment/Plan   Problem List Items Addressed This Visit       Benign essential hypertension    Relevant Medications    atorvastatin (Lipitor) 80 mg tablet    Persistent atrial fibrillation (CMS/HCC)    Relevant Medications    rivaroxaban (Xarelto) 10 mg tablet    Type 2 diabetes  mellitus with hyperglycemia, without long-term current use of insulin (CMS/[facility])    Relevant Medications    atorvastatin (Lipitor) 80 mg tablet     Other Visit Diagnoses       Stroke-like symptoms        Relevant Medications    atorvastatin (Lipitor) 80 mg tablet

## 2023-11-08 NOTE — PATIENT INSTRUCTIONS
"You are doing well!   Continue xarelto and lipitor   Follow up as needed     STROKE AND TIA EDUCATION     You have had a stroke. Strokes can happen when either: an artery going to the brain gets clogged or closes off and part of the brain goes without blood for too long (ischemic stroke); or, an artery breaks open and starts bleeding into or around the brain (hemorrhagic stroke). During recovery, people work to regain some of the abilities they lost. Even though a part of their brain was damaged by the stroke, their brain might be able re-learn how to do some or all of the things it used to do. In general, the majority of a person's recovery happens in the first three to six months after the stroke; after this, improvements in physical and mental function can still happen, but progress tends to slow down. After about 1.5-2 years, what symptoms that are left over are likely your new normal.    The Acronym \"BE FAST\" can help you remember stroke symptoms to watch out for:  Balance - Is the person having trouble standing or walking?  Eyes - Is the person having trouble with their vision?  Face - Does the person's face look uneven or droop on 1 side?  Arm - Does the person have weakness or numbness in 1 or both arms? Does 1 arm drift down if they try to hold both arms out?  Speech - Is the person having trouble speaking? Does their speech sound strange?  Time - If you notice any of these stroke signs, call for an ambulance (call 9-1-1). You need to act FAST. The sooner treatment begins, the better the chances of recovery.    A stroke caused by bleeding in the brain can also cause a sudden, severe headache.    TIA stands for \"transient ischemic attack.\" This is what people call a mini-stroke. It is like a stroke, but does not cause permanent damage to the brain. TIAs happen when an artery in the brain gets clogged or closes off, then reopens on its own. This can happen if a blood clot forms and then moves away or " dissolves.Even though TIAs do not cause lasting symptoms, they are serious. If you have a TIA, you are at high risk of having a stroke. It's important to see a doctor and take steps to prevent that from happening. Do not ignore the symptoms of a stroke even if they go away!     It is important to control your stroke risk factors. These risk factors can increase the risk of stroke but strokes can still happen in people who do not know they are at risk or do not have risk factors. Ischemic stroke risk factors include the following: age older than 40 years, heart disease, high blood pressure, smoking, diabetes, high cholesterol, illegal drug use, recent childbirth, previous TIA history, lack of exercise, obesity, current or past history of blood clots, and family history of heart disease and/or stroke. Try to eat a Mediterranean style diet including lean meats and proteins, healthy fats, limiting sugars, and salt. The American Heart Association recommends 150 minutes of moderate intensity aerobic exercise per week-- strive for this as tolerated and safe for you. Continue tobacco cessation and limiting alcohol.     Hemorrhagic stroke risk factors include the following: high blood pressure, smoking, illegal drug use, and use of blood thinners.

## 2023-11-21 DIAGNOSIS — I48.19 PERSISTENT ATRIAL FIBRILLATION (MULTI): ICD-10-CM

## 2023-11-21 DIAGNOSIS — E11.65 TYPE 2 DIABETES MELLITUS WITH HYPERGLYCEMIA, WITHOUT LONG-TERM CURRENT USE OF INSULIN (MULTI): ICD-10-CM

## 2023-11-21 RX ORDER — GLIPIZIDE 5 MG/1
5 TABLET ORAL 2 TIMES DAILY
Qty: 180 TABLET | Refills: 0 | Status: SHIPPED | OUTPATIENT
Start: 2023-11-21 | End: 2024-02-28

## 2023-11-22 ENCOUNTER — PATIENT OUTREACH (OUTPATIENT)
Dept: CARE COORDINATION | Facility: CLINIC | Age: 83
End: 2023-11-22
Payer: MEDICARE

## 2023-11-22 NOTE — PROGRESS NOTES
Dr. Olvera is going to supply patient with a weeks supply of Eliqus.    Dr. Olvera also sent a prescription to the patient's retail pharmacy to see if patient's insurance will cover the cost of Eliquis.  CM contacted patient, spoke with Angela who states, Johnnie is on his way to Dr. Olvera office to get the samples  of Eliquis.     Tosha Saldana RN

## 2023-11-22 NOTE — PROGRESS NOTES
Outreach call to patient to check in 30 days after hospital discharge to support smooth transition of care. Patient is unable to afford the cost of the Xarelto 20mg; take 1 tab daily. Patient states, when they received the medication from the hospital pharmacy it was only $42.00.    CM contacted patient's retail pharmacy and CM was told the Xarelto requires prior authorization. Pharmacy states, they sent a request over to the physician but have not received a response to their request. They did not say which physician they contacted regarding the the need for prior authorization request.    CM outreach to Izzy Odom and Dr. Olvera was added in the conversation to see how we can assist this patient until Xarelto is approved by patient's insurance.  CM updated patient and spouse on status of prescription with reason for delay in refill request.    CM will continue to follow.  Tosha Saldana RN

## 2023-11-24 ENCOUNTER — OFFICE VISIT (OUTPATIENT)
Dept: PRIMARY CARE | Facility: CLINIC | Age: 83
End: 2023-11-24
Payer: MEDICARE

## 2023-11-24 DIAGNOSIS — I48.19 PERSISTENT ATRIAL FIBRILLATION (MULTI): ICD-10-CM

## 2023-11-24 PROCEDURE — 1159F MED LIST DOCD IN RCRD: CPT | Performed by: FAMILY MEDICINE

## 2023-11-24 PROCEDURE — 1126F AMNT PAIN NOTED NONE PRSNT: CPT | Performed by: FAMILY MEDICINE

## 2023-11-24 PROCEDURE — 1160F RVW MEDS BY RX/DR IN RCRD: CPT | Performed by: FAMILY MEDICINE

## 2023-11-24 PROCEDURE — 1036F TOBACCO NON-USER: CPT | Performed by: FAMILY MEDICINE

## 2023-11-24 PROCEDURE — 96372 THER/PROPH/DIAG INJ SC/IM: CPT | Performed by: FAMILY MEDICINE

## 2023-11-24 RX ADMIN — CYANOCOBALAMIN 1000 MCG: 1000 INJECTION, SOLUTION INTRAMUSCULAR; SUBCUTANEOUS at 10:05

## 2023-12-01 ENCOUNTER — PATIENT OUTREACH (OUTPATIENT)
Dept: CARE COORDINATION | Facility: CLINIC | Age: 83
End: 2023-12-01
Payer: MEDICARE

## 2023-12-01 NOTE — PROGRESS NOTES
ALFREDO spoke with Angela and patients anticoagulant was changed from Xarelto to Eliquis.  Angela states, the Eliquis cost $42.00 which is affordable for them. Angela with no additional needs noted. No additional outreach needed at this time.   Tosha Saldana RN

## 2023-12-26 ENCOUNTER — CLINICAL SUPPORT (OUTPATIENT)
Dept: PRIMARY CARE | Facility: CLINIC | Age: 83
End: 2023-12-26
Payer: MEDICARE

## 2023-12-26 DIAGNOSIS — E53.8 VITAMIN B12 DEFICIENCY: ICD-10-CM

## 2023-12-26 PROCEDURE — 96372 THER/PROPH/DIAG INJ SC/IM: CPT | Performed by: FAMILY MEDICINE

## 2023-12-26 RX ORDER — CYANOCOBALAMIN 1000 UG/ML
1000 INJECTION, SOLUTION INTRAMUSCULAR; SUBCUTANEOUS ONCE
Status: COMPLETED | OUTPATIENT
Start: 2023-12-26 | End: 2023-12-26

## 2023-12-26 RX ADMIN — CYANOCOBALAMIN 1000 MCG: 1000 INJECTION, SOLUTION INTRAMUSCULAR; SUBCUTANEOUS at 08:54

## 2024-01-29 ENCOUNTER — CLINICAL SUPPORT (OUTPATIENT)
Dept: PRIMARY CARE | Facility: CLINIC | Age: 84
End: 2024-01-29
Payer: MEDICARE

## 2024-01-29 DIAGNOSIS — E53.8 VITAMIN B12 DEFICIENCY: ICD-10-CM

## 2024-01-29 PROCEDURE — 96372 THER/PROPH/DIAG INJ SC/IM: CPT | Performed by: FAMILY MEDICINE

## 2024-01-29 RX ORDER — CYANOCOBALAMIN 1000 UG/ML
1000 INJECTION, SOLUTION INTRAMUSCULAR; SUBCUTANEOUS ONCE
Status: COMPLETED | OUTPATIENT
Start: 2024-01-29 | End: 2024-01-29

## 2024-01-29 RX ADMIN — CYANOCOBALAMIN 1000 MCG: 1000 INJECTION, SOLUTION INTRAMUSCULAR; SUBCUTANEOUS at 08:58

## 2024-02-11 ENCOUNTER — APPOINTMENT (OUTPATIENT)
Dept: RADIOLOGY | Facility: HOSPITAL | Age: 84
DRG: 082 | End: 2024-02-11
Payer: MEDICARE

## 2024-02-11 ENCOUNTER — APPOINTMENT (OUTPATIENT)
Dept: CARDIOLOGY | Facility: HOSPITAL | Age: 84
DRG: 082 | End: 2024-02-11
Payer: MEDICARE

## 2024-02-11 ENCOUNTER — APPOINTMENT (OUTPATIENT)
Dept: RADIOLOGY | Facility: HOSPITAL | Age: 84
End: 2024-02-11
Payer: MEDICARE

## 2024-02-11 ENCOUNTER — HOSPITAL ENCOUNTER (EMERGENCY)
Facility: HOSPITAL | Age: 84
Discharge: OTHER INSTITUTION | End: 2024-02-11
Attending: EMERGENCY MEDICINE
Payer: MEDICARE

## 2024-02-11 ENCOUNTER — HOSPITAL ENCOUNTER (INPATIENT)
Facility: HOSPITAL | Age: 84
LOS: 11 days | Discharge: HOSPICE | DRG: 082 | End: 2024-02-22
Attending: EMERGENCY MEDICINE | Admitting: SURGERY
Payer: MEDICARE

## 2024-02-11 ENCOUNTER — HOSPITAL ENCOUNTER (EMERGENCY)
Facility: HOSPITAL | Age: 84
End: 2024-02-11
Payer: MEDICARE

## 2024-02-11 VITALS
BODY MASS INDEX: 27.9 KG/M2 | SYSTOLIC BLOOD PRESSURE: 158 MMHG | WEIGHT: 210.54 LBS | HEIGHT: 73 IN | TEMPERATURE: 97.5 F | OXYGEN SATURATION: 98 % | DIASTOLIC BLOOD PRESSURE: 92 MMHG | HEART RATE: 89 BPM | RESPIRATION RATE: 19 BRPM

## 2024-02-11 DIAGNOSIS — S00.01XA ABRASION OF SCALP, INITIAL ENCOUNTER: ICD-10-CM

## 2024-02-11 DIAGNOSIS — S06.5XAA SUBDURAL HEMATOMA (MULTI): ICD-10-CM

## 2024-02-11 DIAGNOSIS — R55 SYNCOPE AND COLLAPSE: ICD-10-CM

## 2024-02-11 DIAGNOSIS — I61.9 INTRAPARENCHYMAL HEMORRHAGE OF BRAIN (MULTI): Primary | ICD-10-CM
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
## 2024-02-11 DIAGNOSIS — I63.412 CEREBROVASCULAR ACCIDENT (CVA) DUE TO EMBOLISM OF LEFT MIDDLE CEREBRAL ARTERY (MULTI): ICD-10-CM

## 2024-02-11 DIAGNOSIS — S06.6XAA TRAUMATIC SUBARACHNOID HEMORRHAGE WITH UNKNOWN LOSS OF CONSCIOUSNESS STATUS, INITIAL ENCOUNTER (MULTI): ICD-10-CM

## 2024-02-11 DIAGNOSIS — D68.9 COAGULOPATHY (MULTI): ICD-10-CM

## 2024-02-11 LAB
ABO GROUP (TYPE) IN BLOOD: NORMAL
ALBUMIN SERPL BCP-MCNC: 3.6 G/DL (ref 3.4–5)
ALBUMIN SERPL BCP-MCNC: 4.1 G/DL (ref 3.4–5)
ALP SERPL-CCNC: 81 U/L (ref 33–136)
ALP SERPL-CCNC: 92 U/L (ref 33–136)
ALT SERPL W P-5'-P-CCNC: 27 U/L (ref 10–52)
ALT SERPL W P-5'-P-CCNC: 31 U/L (ref 10–52)
ANION GAP SERPL CALC-SCNC: 13 MMOL/L (ref 10–20)
ANION GAP SERPL CALC-SCNC: 14 MMOL/L (ref 10–20)
ANTIBODY SCREEN: NORMAL
ANTIBODY SCREEN: NORMAL
APTT PPP: 43 SECONDS (ref 27–38)
AST SERPL W P-5'-P-CCNC: 29 U/L (ref 9–39)
AST SERPL W P-5'-P-CCNC: 34 U/L (ref 9–39)
BASOPHILS # BLD AUTO: 0.04 X10*3/UL (ref 0–0.1)
BASOPHILS # BLD AUTO: 0.06 X10*3/UL (ref 0–0.1)
BASOPHILS NFR BLD AUTO: 0.3 %
BASOPHILS NFR BLD AUTO: 0.8 %
BILIRUB SERPL-MCNC: 0.9 MG/DL (ref 0–1.2)
BILIRUB SERPL-MCNC: 1 MG/DL (ref 0–1.2)
BUN SERPL-MCNC: 12 MG/DL (ref 6–23)
BUN SERPL-MCNC: 13 MG/DL (ref 6–23)
CALCIUM SERPL-MCNC: 9.3 MG/DL (ref 8.6–10.3)
CALCIUM SERPL-MCNC: 9.5 MG/DL (ref 8.6–10.6)
CARDIAC TROPONIN I PNL SERPL HS: 5 NG/L (ref 0–20)
CHLORIDE SERPL-SCNC: 101 MMOL/L (ref 98–107)
CHLORIDE SERPL-SCNC: 106 MMOL/L (ref 98–107)
CO2 SERPL-SCNC: 26 MMOL/L (ref 21–32)
CO2 SERPL-SCNC: 27 MMOL/L (ref 21–32)
CREAT SERPL-MCNC: 0.69 MG/DL (ref 0.5–1.3)
CREAT SERPL-MCNC: 0.83 MG/DL (ref 0.5–1.3)
EGFRCR SERPLBLD CKD-EPI 2021: 87 ML/MIN/1.73M*2
EGFRCR SERPLBLD CKD-EPI 2021: >90 ML/MIN/1.73M*2
EOSINOPHIL # BLD AUTO: 0.04 X10*3/UL (ref 0–0.4)
EOSINOPHIL # BLD AUTO: 0.1 X10*3/UL (ref 0–0.4)
EOSINOPHIL NFR BLD AUTO: 0.3 %
EOSINOPHIL NFR BLD AUTO: 1.3 %
ERYTHROCYTE [DISTWIDTH] IN BLOOD BY AUTOMATED COUNT: 13.3 % (ref 11.5–14.5)
ERYTHROCYTE [DISTWIDTH] IN BLOOD BY AUTOMATED COUNT: 13.3 % (ref 11.5–14.5)
ETHANOL SERPL-MCNC: <10 MG/DL
FIBRINOGEN PPP-MCNC: 260 MG/DL (ref 200–400)
GLUCOSE BLD MANUAL STRIP-MCNC: 94 MG/DL (ref 74–99)
GLUCOSE SERPL-MCNC: 197 MG/DL (ref 74–99)
GLUCOSE SERPL-MCNC: 86 MG/DL (ref 74–99)
HCT VFR BLD AUTO: 37 % (ref 41–52)
HCT VFR BLD AUTO: 37.2 % (ref 41–52)
HGB BLD-MCNC: 13 G/DL (ref 13.5–17.5)
HGB BLD-MCNC: 13.6 G/DL (ref 13.5–17.5)
IMM GRANULOCYTES # BLD AUTO: 0.05 X10*3/UL (ref 0–0.5)
IMM GRANULOCYTES # BLD AUTO: 0.06 X10*3/UL (ref 0–0.5)
IMM GRANULOCYTES NFR BLD AUTO: 0.5 % (ref 0–0.9)
IMM GRANULOCYTES NFR BLD AUTO: 0.7 % (ref 0–0.9)
INR PPP: 1.7 (ref 0.9–1.1)
INR PPP: 2.9 (ref 0.9–1.1)
LACTATE SERPL-SCNC: 1.8 MMOL/L (ref 0.4–2)
LYMPHOCYTES # BLD AUTO: 1.56 X10*3/UL (ref 0.8–3)
LYMPHOCYTES # BLD AUTO: 1.93 X10*3/UL (ref 0.8–3)
LYMPHOCYTES NFR BLD AUTO: 12.1 %
LYMPHOCYTES NFR BLD AUTO: 25.7 %
MCH RBC QN AUTO: 31.9 PG (ref 26–34)
MCH RBC QN AUTO: 33.3 PG (ref 26–34)
MCHC RBC AUTO-ENTMCNC: 34.9 G/DL (ref 32–36)
MCHC RBC AUTO-ENTMCNC: 36.8 G/DL (ref 32–36)
MCV RBC AUTO: 91 FL (ref 80–100)
MCV RBC AUTO: 91 FL (ref 80–100)
MONOCYTES # BLD AUTO: 0.79 X10*3/UL (ref 0.05–0.8)
MONOCYTES # BLD AUTO: 0.96 X10*3/UL (ref 0.05–0.8)
MONOCYTES NFR BLD AUTO: 10.5 %
MONOCYTES NFR BLD AUTO: 7.4 %
NEUTROPHILS # BLD AUTO: 10.26 X10*3/UL (ref 1.6–5.5)
NEUTROPHILS # BLD AUTO: 4.58 X10*3/UL (ref 1.6–5.5)
NEUTROPHILS NFR BLD AUTO: 61 %
NEUTROPHILS NFR BLD AUTO: 79.4 %
NRBC BLD-RTO: 0 /100 WBCS (ref 0–0)
NRBC BLD-RTO: 0 /100 WBCS (ref 0–0)
PLATELET # BLD AUTO: 201 X10*3/UL (ref 150–450)
PLATELET # BLD AUTO: 208 X10*3/UL (ref 150–450)
POTASSIUM SERPL-SCNC: 3.6 MMOL/L (ref 3.5–5.3)
POTASSIUM SERPL-SCNC: 3.7 MMOL/L (ref 3.5–5.3)
PROT SERPL-MCNC: 6.2 G/DL (ref 6.4–8.2)
PROT SERPL-MCNC: 7.1 G/DL (ref 6.4–8.2)
PROTHROMBIN TIME: 19.6 SECONDS (ref 9.8–12.8)
PROTHROMBIN TIME: 33.1 SECONDS (ref 9.8–12.8)
RBC # BLD AUTO: 4.08 X10*6/UL (ref 4.5–5.9)
RBC # BLD AUTO: 4.08 X10*6/UL (ref 4.5–5.9)
RH FACTOR (ANTIGEN D): NORMAL
SODIUM SERPL-SCNC: 137 MMOL/L (ref 136–145)
SODIUM SERPL-SCNC: 142 MMOL/L (ref 136–145)
WBC # BLD AUTO: 12.9 X10*3/UL (ref 4.4–11.3)
WBC # BLD AUTO: 7.5 X10*3/UL (ref 4.4–11.3)

## 2024-02-11 PROCEDURE — 2500000001 HC RX 250 WO HCPCS SELF ADMINISTERED DRUGS (ALT 637 FOR MEDICARE OP): Performed by: NURSE PRACTITIONER

## 2024-02-11 PROCEDURE — 70450 CT HEAD/BRAIN W/O DYE: CPT

## 2024-02-11 PROCEDURE — 80053 COMPREHEN METABOLIC PANEL: CPT | Performed by: EMERGENCY MEDICINE

## 2024-02-11 PROCEDURE — 86901 BLOOD TYPING SEROLOGIC RH(D): CPT | Performed by: STUDENT IN AN ORGANIZED HEALTH CARE EDUCATION/TRAINING PROGRAM

## 2024-02-11 PROCEDURE — 99285 EMERGENCY DEPT VISIT HI MDM: CPT | Performed by: EMERGENCY MEDICINE

## 2024-02-11 PROCEDURE — 85384 FIBRINOGEN ACTIVITY: CPT | Performed by: STUDENT IN AN ORGANIZED HEALTH CARE EDUCATION/TRAINING PROGRAM

## 2024-02-11 PROCEDURE — 70450 CT HEAD/BRAIN W/O DYE: CPT | Performed by: RADIOLOGY

## 2024-02-11 PROCEDURE — 36415 COLL VENOUS BLD VENIPUNCTURE: CPT | Performed by: STUDENT IN AN ORGANIZED HEALTH CARE EDUCATION/TRAINING PROGRAM

## 2024-02-11 PROCEDURE — 2500000004 HC RX 250 GENERAL PHARMACY W/ HCPCS (ALT 636 FOR OP/ED)

## 2024-02-11 PROCEDURE — 99285 EMERGENCY DEPT VISIT HI MDM: CPT | Mod: 25 | Performed by: EMERGENCY MEDICINE

## 2024-02-11 PROCEDURE — 82077 ASSAY SPEC XCP UR&BREATH IA: CPT | Performed by: EMERGENCY MEDICINE

## 2024-02-11 PROCEDURE — 85025 COMPLETE CBC W/AUTO DIFF WBC: CPT | Performed by: STUDENT IN AN ORGANIZED HEALTH CARE EDUCATION/TRAINING PROGRAM

## 2024-02-11 PROCEDURE — 85610 PROTHROMBIN TIME: CPT | Performed by: STUDENT IN AN ORGANIZED HEALTH CARE EDUCATION/TRAINING PROGRAM

## 2024-02-11 PROCEDURE — 83605 ASSAY OF LACTIC ACID: CPT | Performed by: EMERGENCY MEDICINE

## 2024-02-11 PROCEDURE — 82947 ASSAY GLUCOSE BLOOD QUANT: CPT

## 2024-02-11 PROCEDURE — 86901 BLOOD TYPING SEROLOGIC RH(D): CPT | Performed by: EMERGENCY MEDICINE

## 2024-02-11 PROCEDURE — 85025 COMPLETE CBC W/AUTO DIFF WBC: CPT | Performed by: EMERGENCY MEDICINE

## 2024-02-11 PROCEDURE — 80053 COMPREHEN METABOLIC PANEL: CPT | Performed by: STUDENT IN AN ORGANIZED HEALTH CARE EDUCATION/TRAINING PROGRAM

## 2024-02-11 PROCEDURE — 96374 THER/PROPH/DIAG INJ IV PUSH: CPT

## 2024-02-11 PROCEDURE — 93005 ELECTROCARDIOGRAM TRACING: CPT

## 2024-02-11 PROCEDURE — 84484 ASSAY OF TROPONIN QUANT: CPT | Performed by: EMERGENCY MEDICINE

## 2024-02-11 PROCEDURE — 99291 CRITICAL CARE FIRST HOUR: CPT | Mod: 25,27 | Performed by: EMERGENCY MEDICINE

## 2024-02-11 PROCEDURE — 36415 COLL VENOUS BLD VENIPUNCTURE: CPT | Performed by: EMERGENCY MEDICINE

## 2024-02-11 PROCEDURE — 70450 CT HEAD/BRAIN W/O DYE: CPT | Performed by: STUDENT IN AN ORGANIZED HEALTH CARE EDUCATION/TRAINING PROGRAM

## 2024-02-11 PROCEDURE — 2500000004 HC RX 250 GENERAL PHARMACY W/ HCPCS (ALT 636 FOR OP/ED): Performed by: NURSE PRACTITIONER

## 2024-02-11 PROCEDURE — 85610 PROTHROMBIN TIME: CPT | Performed by: EMERGENCY MEDICINE

## 2024-02-11 PROCEDURE — 72125 CT NECK SPINE W/O DYE: CPT | Performed by: RADIOLOGY

## 2024-02-11 PROCEDURE — 2020000001 HC ICU ROOM DAILY

## 2024-02-11 PROCEDURE — 99223 1ST HOSP IP/OBS HIGH 75: CPT | Performed by: SURGERY

## 2024-02-11 PROCEDURE — A4217 STERILE WATER/SALINE, 500 ML: HCPCS

## 2024-02-11 PROCEDURE — G0390 TRAUMA RESPONS W/HOSP CRITI: HCPCS

## 2024-02-11 PROCEDURE — 72125 CT NECK SPINE W/O DYE: CPT

## 2024-02-11 PROCEDURE — 6360000002 HC RX 636 FACTOR: Mod: JZ

## 2024-02-11 RX ORDER — ATORVASTATIN CALCIUM 80 MG/1
80 TABLET, FILM COATED ORAL NIGHTLY
Status: DISCONTINUED | OUTPATIENT
Start: 2024-02-11 | End: 2024-02-20

## 2024-02-11 RX ORDER — LEVETIRACETAM 5 MG/ML
500 INJECTION INTRAVASCULAR EVERY 12 HOURS
Status: DISCONTINUED | OUTPATIENT
Start: 2024-02-11 | End: 2024-02-16

## 2024-02-11 RX ORDER — INSULIN LISPRO 100 [IU]/ML
0-5 INJECTION, SOLUTION INTRAVENOUS; SUBCUTANEOUS EVERY 4 HOURS
Status: DISCONTINUED | OUTPATIENT
Start: 2024-02-11 | End: 2024-02-19

## 2024-02-11 RX ORDER — DEXTROSE 50 % IN WATER (D50W) INTRAVENOUS SYRINGE
25
Status: DISCONTINUED | OUTPATIENT
Start: 2024-02-11 | End: 2024-02-20

## 2024-02-11 RX ORDER — METOPROLOL TARTRATE 25 MG/1
25 TABLET, FILM COATED ORAL 2 TIMES DAILY
Status: DISCONTINUED | OUTPATIENT
Start: 2024-02-11 | End: 2024-02-13

## 2024-02-11 RX ORDER — DEXTROSE MONOHYDRATE 100 MG/ML
0.3 INJECTION, SOLUTION INTRAVENOUS ONCE AS NEEDED
Status: DISCONTINUED | OUTPATIENT
Start: 2024-02-11 | End: 2024-02-20

## 2024-02-11 RX ADMIN — LEVETIRACETAM 500 MG: 5 INJECTION INTRAVENOUS at 21:22

## 2024-02-11 RX ADMIN — ANDEXANET ALFA 4 MG/MIN: 200 INJECTION, POWDER, LYOPHILIZED, FOR SOLUTION INTRAVENOUS at 18:39

## 2024-02-11 RX ADMIN — ATORVASTATIN CALCIUM 80 MG: 80 TABLET, FILM COATED ORAL at 21:21

## 2024-02-11 RX ADMIN — METOPROLOL TARTRATE 25 MG: 25 TABLET, FILM COATED ORAL at 21:21

## 2024-02-11 ASSESSMENT — ENCOUNTER SYMPTOMS
CHEST TIGHTNESS: 0
DIZZINESS: 0
COLOR CHANGE: 0
TROUBLE SWALLOWING: 0
SHORTNESS OF BREATH: 0
WOUND: 1
SPEECH DIFFICULTY: 0
WEAKNESS: 0
ABDOMINAL PAIN: 0
NAUSEA: 0
ABDOMINAL DISTENTION: 0

## 2024-02-11 ASSESSMENT — PAIN SCALES - GENERAL
PAINLEVEL_OUTOF10: 0 - NO PAIN

## 2024-02-11 ASSESSMENT — PAIN - FUNCTIONAL ASSESSMENT
PAIN_FUNCTIONAL_ASSESSMENT: 0-10

## 2024-02-11 ASSESSMENT — LIFESTYLE VARIABLES
EVER HAD A DRINK FIRST THING IN THE MORNING TO STEADY YOUR NERVES TO GET RID OF A HANGOVER: NO
EVER FELT BAD OR GUILTY ABOUT YOUR DRINKING: NO
HAVE YOU EVER FELT YOU SHOULD CUT DOWN ON YOUR DRINKING: NO
HAVE PEOPLE ANNOYED YOU BY CRITICIZING YOUR DRINKING: NO

## 2024-02-11 ASSESSMENT — COLUMBIA-SUICIDE SEVERITY RATING SCALE - C-SSRS
1. IN THE PAST MONTH, HAVE YOU WISHED YOU WERE DEAD OR WISHED YOU COULD GO TO SLEEP AND NOT WAKE UP?: NO
2. HAVE YOU ACTUALLY HAD ANY THOUGHTS OF KILLING YOURSELF?: NO
1. IN THE PAST MONTH, HAVE YOU WISHED YOU WERE DEAD OR WISHED YOU COULD GO TO SLEEP AND NOT WAKE UP?: NO
2. HAVE YOU ACTUALLY HAD ANY THOUGHTS OF KILLING YOURSELF?: NO
6. HAVE YOU EVER DONE ANYTHING, STARTED TO DO ANYTHING, OR PREPARED TO DO ANYTHING TO END YOUR LIFE?: NO
6. HAVE YOU EVER DONE ANYTHING, STARTED TO DO ANYTHING, OR PREPARED TO DO ANYTHING TO END YOUR LIFE?: NO

## 2024-02-11 NOTE — SIGNIFICANT EVENT
Patient was evaluated bedside after repeat CT head was performed. Exam is as follows:    Awake, Alert, Oriented x2-3 with choice  OU3R  Fcx4 5/5  No pronator drift    The following recommendation was re-iterated with the team:   TSICU admission with q1hr neuro checks  Reversal for Xarelto  Keppra for seizure prophylaxis  Repeat CT head in 6 hrs from last scan    Patient is discussed with chief resident and attending physician, who agrees with above plan    Tod Dodson MD

## 2024-02-11 NOTE — CONSULTS
Consults    Reason For Consult  tSAH    History Of Present Illness  Johnnie Rudolph is a 83 y.o. male with HTN, HLD, A-fib (on xarelto), recent ischemic stroke (10/2023, not on ASA), T2DM, cataracts, glaucoma, p/w fall (+HS, +LOC), CTH small LF tSAH.    Patient states that he failed 2 quarts around noon 2/11/2023.  Endorses head strike and loss of consciousness.  Denies headache, change in vision, change sensation, nausea, vomiting, weakness, or any other focal neurologic deficits    Patient is on Xarelto, last dose 0900 AM on 2/11/2023.    Imaging is personally reveiwed and CTH is notable small LF  tSAH.     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    Surgical History  He has a past surgical history that includes Knee surgery (06/02/2014); Hernia repair (06/02/2014); Other surgical history (03/25/2022); MR angio head wo IV contrast (10/13/2023); and MR angio neck wo IV contrast (10/13/2023).     Social History  He reports that he has never smoked. He has never used smokeless tobacco. He reports that he does not drink alcohol and does not use drugs.    Family History  Family History   Problem Relation Name Age of Onset    Skin  "cancer Father      Hypertension Son      Diabetes Other      Prostate cancer Other          Allergies  Patient has no known allergies.    Review of Systems  Review of systems was reviewed and otherwise negative other than what was listed in the HPI.    Physical Exam  NAD, A&Ox3  PERRL, EOMI, Face symmetric, Facial SILT, Palate/Tongue midline and symmetric, shoulder shrugs symmetric, hearing intact to finger rubs bilaterally  Motor: 5/5, no pronator drift  Sensation: SILT throughout all extremities  DTRS: 2+ Throughout, No Hoffmans or Clonus  Small superficial abrasion on posterior scalp  Last Recorded Vitals  Blood pressure (!) 153/101, pulse 94, temperature 36.8 °C (98.3 °F), temperature source Oral, resp. rate 16, height 1.854 m (6' 1\"), weight 95.5 kg (210 lb 8.6 oz), SpO2 96 %.    Relevant Results  Results for orders placed or performed during the hospital encounter of 02/11/24 (from the past 24 hour(s))   CBC and Auto Differential   Result Value Ref Range    WBC 7.5 4.4 - 11.3 x10*3/uL    nRBC 0.0 0.0 - 0.0 /100 WBCs    RBC 4.08 (L) 4.50 - 5.90 x10*6/uL    Hemoglobin 13.0 (L) 13.5 - 17.5 g/dL    Hematocrit 37.2 (L) 41.0 - 52.0 %    MCV 91 80 - 100 fL    MCH 31.9 26.0 - 34.0 pg    MCHC 34.9 32.0 - 36.0 g/dL    RDW 13.3 11.5 - 14.5 %    Platelets 201 150 - 450 x10*3/uL    Neutrophils % 61.0 40.0 - 80.0 %    Immature Granulocytes %, Automated 0.7 0.0 - 0.9 %    Lymphocytes % 25.7 13.0 - 44.0 %    Monocytes % 10.5 2.0 - 10.0 %    Eosinophils % 1.3 0.0 - 6.0 %    Basophils % 0.8 0.0 - 2.0 %    Neutrophils Absolute 4.58 1.60 - 5.50 x10*3/uL    Immature Granulocytes Absolute, Automated 0.05 0.00 - 0.50 x10*3/uL    Lymphocytes Absolute 1.93 0.80 - 3.00 x10*3/uL    Monocytes Absolute 0.79 0.05 - 0.80 x10*3/uL    Eosinophils Absolute 0.10 0.00 - 0.40 x10*3/uL    Basophils Absolute 0.06 0.00 - 0.10 x10*3/uL   Comprehensive Metabolic Panel   Result Value Ref Range    Glucose 197 (H) 74 - 99 mg/dL    Sodium 137 136 - 145 " mmol/L    Potassium 3.7 3.5 - 5.3 mmol/L    Chloride 101 98 - 107 mmol/L    Bicarbonate 27 21 - 32 mmol/L    Anion Gap 13 10 - 20 mmol/L    Urea Nitrogen 13 6 - 23 mg/dL    Creatinine 0.83 0.50 - 1.30 mg/dL    eGFR 87 >60 mL/min/1.73m*2    Calcium 9.3 8.6 - 10.3 mg/dL    Albumin 4.1 3.4 - 5.0 g/dL    Alkaline Phosphatase 92 33 - 136 U/L    Total Protein 7.1 6.4 - 8.2 g/dL    AST 29 9 - 39 U/L    Bilirubin, Total 1.0 0.0 - 1.2 mg/dL    ALT 31 10 - 52 U/L   Alcohol   Result Value Ref Range    Alcohol <10 <=10 mg/dL   Lactate   Result Value Ref Range    Lactate 1.8 0.4 - 2.0 mmol/L   Protime-INR   Result Value Ref Range    Protime 19.6 (H) 9.8 - 12.8 seconds    INR 1.7 (H) 0.9 - 1.1   Type And Screen   Result Value Ref Range    ABO TYPE O     Rh TYPE POS     ANTIBODY SCREEN NEG    Troponin I, High Sensitivity   Result Value Ref Range    Troponin I, High Sensitivity 5 0 - 20 ng/L     CT head W O contrast trauma protocol    Result Date: 2/11/2024  Interpreted By:  Drew Huddleston, STUDY: CT HEAD W/O CONTRAST TRAUMA PROTOCOL  2/11/2024 1:26 pm   INDICATION: Signs/Symptoms:fell hit head on thinners       Positive study for acute intracranial hemorrhage. Trace acute subarachnoid hemorrhage involving the left frontal lobe anteriorly. No significant mass effect otherwise.         CT cervical spine wo IV contrast    Result Date: 2/11/2024  Interpreted By:  Drew Huddleston, STUDY: CT CERVICAL SPINE WO IV CONTRAST;  2/11/2024 1:26 pm   INDICATION: Signs/Symptoms:fall hit head.       No evidence for an acute fracture or subluxation of the cervical spine. Mild biapical interstitial edema.        Assessment/Plan     Johnnie Rudolph is a 83 y.o. male with HTN, HLD, A-fib (on xarelto, LD 2/11/23 0900 AM), recent ischemic stroke (10/2023, not on ASA), T2DM, cataracts, glaucoma, p/w fall (+HS, +LOC), CTH small LF tSAH, contusion.    Patient if intact on neurological exam.  However, given the patient is on Xarelto with last dose being 0900 AM  today, we recommend repeat CT head 4 hours from initial scan.  Will determine the need for reversal based on repeat CT head.    Recs  - trauma primary  - repeat CT head 4 hrs form initial scan  - CBC/RFP/coag/T&S/UA/EKG/CXR  - Keppra 500mg BID   - further recs pending repeat CTH       Updated Recs  - repeat CTH is notable for worsening bleeding/contusion  - Please reverse Xarelto juancho  - TSICU admission with q1hr neuro checks  - Recommend another repeat CTH in 6 hrs    Patient is discussed with chief resident and attending physician, who agrees with above assessment and plan.    Tod Dodson MD

## 2024-02-11 NOTE — PROGRESS NOTES
Pharmacy Medication History Review    Johnnie Rudolph is a 83 y.o. male admitted for No Principal Problem: There is no principal problem currently on the Problem List. Please update the Problem List and refresh.. Pharmacy reviewed the patient's [id number] medications and allergies for accuracy.    The list below reflects the updated PTA list. Comments regarding how patient may be taking medications differently can be found in the Admit Orders Activity  Prior to Admission Medications   Prescriptions Last Dose             atorvastatin (Lipitor) 80 mg tablet    Sig: Take 1 tablet (80 mg) by mouth once daily at bedtime.   glipiZIDE (Glucotrol) 5 mg tablet    Sig: Take 1 tablet (5 mg) by mouth 2 times a day.   lisinopriL-hydrochlorothiazide 20-12.5 mg tablet    Sig: Take 1 tablet by mouth once daily.   metFORMIN (Glucophage) 1,000 mg tablet    Sig: Take 1 tablet (1,000 mg) by mouth 2 times a day with meals.   metoprolol tartrate (Lopressor) 25 mg tablet    Sig: Take 1 tablet (25 mg) by mouth 2 times a day.   rivaroxaban (Xarelto) 20 mg tablet    Sig: Take 1 tablet (20 mg) by mouth once daily.      Facility-Administered Medications: None        The list below reflects the updated allergy list. Please review each documented allergy for additional clarification and justification.  Allergies  Reviewed by Jonny Coughlin RN on 2/11/2024   No Known Allergies         Patient declines M2B at discharge. Pharmacy has been updated to [facility] in [place], [place].    Sources:    -went to patient room, he was out for a test. Interviewed wife about medications and she had all prescription bottles from home with her  -outpatient pharmacy dispense history with [facility]  -PCP office visit note/med list with Dr. Olvera 11/24/23  -[facility]    Below are additional concerns with the patient's PTA list:    -Wife confirms plavix was stopped a while ago (last filled 10/14/23 x 30 days) and that patient does NOT take aspirin at  home    -some records of eliquis and xarelto in the chart in PCP note from 11/24??  but wife confirms patients is NOT on eliquis and there was NO pharmacy fill history for eliquis.  Also, only had a bottle of xarelto from home.          Case Huddleston, PharmD  Transitions of Care Pharmacist  [facility]  Please reach out via Secure Chat for questions, or if no response call [phone] or vocera [facility]

## 2024-02-11 NOTE — SIGNIFICANT EVENT
Brief Trauma Update  6:13 PM 02/11/24    83M Afib (Eliquis) who feel down three steps earlier today with ?LOC. Initially presented to [facility] and found to have trace L frontal SAH with GCS 15. Plan had been observation with repeat CT. On my review of the repeat scan, there is significant worsening of the L frontal SAH, and a new left frontal hemorrhage. He remains GCS 15 on my exam, Aox2 (knew month but not year). Family at bedside feels that he is at neurologic baseline. He is getting Andexanet for reversal and we will admit to the ICU for close monitoring. NSG aware of the worsened scan. Plan for repeat CT in 4-6h.    Damon Ugalde MD  Trauma, Critical Care, and Acute Care Surgery  Cell: [phone]  Pager: [phone]

## 2024-02-11 NOTE — ED PROCEDURE NOTE
Procedure  Critical Care    Performed by: Usama Prieto MD  Authorized by: Usama Prieto MD    Critical care provider statement:     Critical care time (minutes):  45    Critical care time was exclusive of:  Separately billable procedures and treating other patients    Critical care was necessary to treat or prevent imminent or life-threatening deterioration of the following conditions:  Trauma    Critical care was time spent personally by me on the following activities:  Development of treatment plan with patient or surrogate, discussions with consultants, examination of patient, obtaining history from patient or surrogate, ordering and performing treatments and interventions, ordering and review of laboratory studies, ordering and review of radiographic studies, pulse oximetry and re-evaluation of patient's condition               Usama Prieto MD  02/11/24 [id number]

## 2024-02-11 NOTE — ED TRIAGE NOTES
Pt BIB EMS as a transfer from [facility] after being seen for a fall at home today. Pt states he fell at home(witnessed by girlfriend - states he fell backwards when decending two steps), but does not remember the events of the fall. Pt found to have a SAH and transferred here for a trauma evaluation. PT denies any complaints at this time as well as as any medical hx. Pt is Aox3. Initial neurological assessment was unremarkable except for patient is hard of hearing.

## 2024-02-11 NOTE — ED PROVIDER NOTES
HPI   Chief Complaint   Patient presents with    Fall     HIA       Johnnie is a 83-year-old male who fell down 3 steps and bumped the back of his head.  Unsure if he lost consciousness or not he does take Eliquis for previous stroke.  He has had previous falls with similar confusion surrounding it.  He is supposed to use walker but tends not to according to his son by phone.  Patient had an Accu-Chek done by EMS prior to arrival and it was approximate 200.  Unknown loss of consciousness because the patient cannot recall.  Because he takes blood thinners and hit his head he was worked up as an HIA.  He is believed to have fallen earlier today.  Very limited historian comes mostly from EMS his son and EMR.  Son reports that this is not a new phenomenon.                          [place] Coma Scale Score: 15                     Patient History   Past Medical History:   Diagnosis Date    Body mass index (BMI) 29.0-29.9, adult 12/11/2020    Body mass index (BMI) of 29.0 to 29.9 in adult    Body mass index (BMI) 31.0-31.9, adult 04/26/2019    BMI 31.0-31.9,adult    Contact with and (suspected) exposure to potentially hazardous body fluids 04/02/2018    Exposure to blood    Disorder of male genital organs, unspecified 04/02/2018    Disorder of male genital organs    History of falling 01/16/2015    History of fall    Laceration without foreign body of unspecified part of head, initial encounter 05/15/2018    Laceration of head    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    Nontraumatic chronic subdural hemorrhage    Other cranial cerebrospinal fluid leak     Subdural hygroma    Other fecal abnormalities 05/15/2017    Occult blood positive stool    Other symptoms and signs involving cognitive functions and awareness 08/03/2015    Cognitive impairment    Pain in left shoulder 08/08/2016    Acute pain of left shoulder    Personal history of other diseases of the musculoskeletal system and connective tissue  11/04/2014    History of back pain    Postconcussional syndrome 08/03/2015    Post concussion syndrome     Past Surgical History:   Procedure Laterality Date    HERNIA REPAIR  06/02/2014    Hernia Repair    KNEE SURGERY  06/02/2014    Knee Surgery    MR HEAD ANGIO WO IV CONTRAST  10/13/2023    MR HEAD ANGIO WO IV CONTRAST 10/13/2023 [facility]    MR NECK ANGIO WO IV CONTRAST  10/13/2023    MR NECK ANGIO WO IV CONTRAST 10/13/2023 [facility]    OTHER SURGICAL HISTORY  03/25/2022    Inguinal hernia repair laparoscopic     Family History   Problem Relation Name Age of Onset    Skin cancer Father      Hypertension Son      Diabetes Other      Prostate cancer Other       Social History     Tobacco Use    Smoking status: Never    Smokeless tobacco: Never   Substance Use Topics    Alcohol use: Never    Drug use: Never       Physical Exam   ED Triage Vitals [02/11/24 1303]   Temperature Heart Rate Respirations BP   35.4 °C (95.7 °F) 74 18 133/84      Pulse Ox Temp src Heart Rate Source Patient Position   98 % -- -- --      BP Location FiO2 (%)     -- --       Physical Exam  Vitals reviewed.   Constitutional:       General: He is awake.      Appearance: Normal appearance.   HENT:      Head: Normocephalic.      Nose: Nose normal.   Neck:      Comments: Good range of motion and no pain with palpation  Cardiovascular:      Rate and Rhythm: Normal rate and regular rhythm.   Pulmonary:      Effort: Pulmonary effort is normal.      Breath sounds: Normal breath sounds.   Abdominal:      Palpations: Abdomen is soft.   Musculoskeletal:      Cervical back: Normal range of motion.   Skin:     General: Skin is warm.      Capillary Refill: Capillary refill takes less than 2 seconds.      Comments: Posterior scalp has some superficial abrasions which are currently not bleeding dressing in place   Neurological:      Mental Status: He is alert.      Comments: Patient has very limited recall.  He does not recall falling and is unsure if he lost  consciousness.  He does not know why he fell.         ED Course & MDM   ED Course as of 02/11/24 1421   Sun Feb 11, 2024   1313 Patient was worked up as an HIA.  He fell down 3 stairs.  He is a very limited historian and does not recall the events.  His memory issues may or may not be new.  He is unable to recall exactly what happened and only notes that he bumped his head.  He has an abrasion on the back of the head that was identified by EMS.  Worked up as an HIA CT scan of head and C-spine were requested along with blood work.  He has not any other pain or complaints. [RZ]   1320 EKG interpreted by myself done at 1:02 PM shows atrial fibrillation rate of 75 bpm this is similar to previous EKG October 12, 2023 no new ischemia [RZ]   1320 Talk to the patient's son Glen Crenshaw by phone.  He called inquiring about his father.  His phone number is [phone].  He told me that his father has had a stroke in the past supposed use a walker but tends not to and has had falls similar to this injury does not recall the events around it. [RZ]   1418 Call from the radiologist saying there is a small subarachnoid hemorrhage which they read as trace.  I spoke to the transfer center Dr. Camarena and Dr. IBANEZ.  Initially they recommended I consider keeping the patient here and repeat imaging in 6 hours. Upon further evaluation of the Head Injury Protocol it recommends transfer if on thinners. I called back and talked to Dr. Camarena again and he accepted patient in the ED and transport was arranged by transfer center. [RZ]   1420 I talked to the patient's son Glen who will inform other family members.  I updated the patient.  Transport center arranged transfer with the squad being here in approximately 20 minutes. Scalp wound was cleaned by nursing.  [RZ]      ED Course User Index  [RZ] Usama Prieto MD         Diagnoses as of 02/11/24 1421   Fall, initial encounter   Coagulopathy (CMS/HCC)   Abrasion of scalp, initial  encounter       Medical Decision Making      Procedure  Procedures     Usama Prieto MD  02/11/24 2317

## 2024-02-11 NOTE — CONSULTS
[facility]  TRAUMA SERVICE - HISTORY AND PHYSICAL / CONSULT    Patient Name: Johnnie Rudolph  MRN: [id number]  Admit Date: 211  : 1940  AGE: 83 y.o.   GENDER: male  ==============================================================================  MECHANISM OF INJURY / CHIEF COMPLAINT:   82 yo M who fell down 3 steps and bumped the back of his head. Unsure if he lost consciousness or not. Patient currently takes Eliquis for ischemic stroke in 10/23. Patient has history of falls.    LOC (yes/no?): Unknown  Anticoagulant / Anti-platelet Rx? (for what dx?): Eliquis (Afib)  Referring Facility Name (N/A for scene EMR run): [facility]    INJURIES:   Trace acute subarachnoid hemorrhage involving L frontal lobe    OTHER MEDICAL PROBLEMS:  Ischemic stroke (10/23)  HTN  A-Fib  T2DM  Glaucoma    INCIDENTAL FINDINGS:  N/A    ==============================================================================  ADMISSION PLAN OF CARE:  82 yo M who fell down 3 steps and bumped the back of his head around 12 pm today. Unknown loss of consciousness. Currently takes Eliquis for history of Afib. Patient with recent ischemic stroke 10/23. Patient has had several falls in the past. Only symptom following the fall was confusion. Patient presented to [facility] and underwent imaging which demonstrated trace L frontal subarachnoid hemorrhage without signs of mass effect. Patient transferred to [facility] for evaluation by trauma and neurosurgery. Patient is asymptomatic from the fall aside from confusion and minor abrasion. He has no focal deficits.     #Trace, Acute Left Frontal Subarachnoid Hemorrhage  - Recommend neurosurgery evaluation   - Hold anticoagulation, can resume per NSGY protocol  - Recommend admission to the CDU for close clinical monitoring  - Repeat CT scan in 4 hours, assess size of bleed  - No other traumatic injuries  observed    -----------------------------------------------------------------  ADDENDUM 1811  -Repeat head CT on arrival shows moderate/large increase in size of left frontal subarachnoid hemorrhage.  - Plan for admission to ICU for close monitoring  - Repeat CT in 6 hours.    -Updated clinical events relayed to attending Dr. Ugalde.    Patient discussed with attending physician, Dr. Jessica Bruce,   Department of Surgery / [facility] PGY1  Trauma [id number]  =============================================================================  PAST MEDICAL HISTORY:   PMH:   Past Medical History:   Diagnosis Date    Body mass index (BMI) 29.0-29.9, adult 12/11/2020    Body mass index (BMI) of 29.0 to 29.9 in adult    Body mass index (BMI) 31.0-31.9, adult 04/26/2019    BMI 31.0-31.9,adult    Contact with and (suspected) exposure to potentially hazardous body fluids 04/02/2018    Exposure to blood    Disorder of male genital organs, unspecified 04/02/2018    Disorder of male genital organs    History of falling 01/16/2015    History of fall    Laceration without foreign body of unspecified part of head, initial encounter 05/15/2018    Laceration of head    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    Nontraumatic chronic subdural hemorrhage    Other cranial cerebrospinal fluid leak     Subdural hygroma    Other fecal abnormalities 05/15/2017    Occult blood positive stool    Other symptoms and signs involving cognitive functions and awareness 08/03/2015    Cognitive impairment    Pain in left shoulder 08/08/2016    Acute pain of left shoulder    Personal history of other diseases of the musculoskeletal system and connective tissue 11/04/2014    History of back pain    Postconcussional syndrome 08/03/2015    Post concussion syndrome     Last menstrual period: N/A    PSH:   Past Surgical History:   Procedure Laterality Date    HERNIA REPAIR  06/02/2014    Hernia Repair    KNEE SURGERY  06/02/2014     Knee Surgery    MR HEAD ANGIO WO IV CONTRAST  10/13/2023    MR HEAD ANGIO WO IV CONTRAST 10/13/2023 [facility]    MR NECK ANGIO WO IV CONTRAST  10/13/2023    MR NECK ANGIO WO IV CONTRAST 10/13/2023 [facility]    OTHER SURGICAL HISTORY  03/25/2022    Inguinal hernia repair laparoscopic     FH:   Family History   Problem Relation Name Age of Onset    Skin cancer Father      Hypertension Son      Diabetes Other      Prostate cancer Other       SOCIAL HISTORY:    Smoking:   Social History     Tobacco Use   Smoking Status Never   Smokeless Tobacco Never       Alcohol:   Social History     Substance and Sexual Activity   Alcohol Use Never       Drug use: N/A    MEDICATIONS:   Prior to Admission medications    Medication Sig Start Date End Date Taking? Authorizing Provider   apixaban (Eliquis) 5 mg tablet Take 1 tablet (5 mg) by mouth 2 times a day. 11/28/23 11/27/24  Jason Olvera DO   atorvastatin (Lipitor) 80 mg tablet Take 1 tablet (80 mg) by mouth once daily at bedtime. 11/8/23 11/7/24  Izzy Juarez PA-C   glipiZIDE (Glucotrol) 5 mg tablet Take 1 tablet (5 mg) by mouth 2 times a day. 11/21/23 5/19/24  Jason Olvera DO   lisinopriL-hydrochlorothiazide 20-12.5 mg tablet Take 1 tablet by mouth once daily. 10/19/23 4/16/24  Jason Olvera DO   metFORMIN (Glucophage) 1,000 mg tablet Take 1 tablet (1,000 mg) by mouth 2 times a day with meals. 7/24/23 1/20/24  Jason Olvera DO   metoprolol tartrate (Lopressor) 25 mg tablet Take 1 tablet (25 mg) by mouth 2 times a day. 7/24/23 1/20/24  Jason Olvera DO   rivaroxaban (Xarelto) 20 mg tablet Take 1 tablet (20 mg) by mouth once daily. 11/28/23 11/27/24  Jason Olvera DO     ALLERGIES:   No Known Allergies    REVIEW OF SYSTEMS:  Review of Systems   HENT:  Negative for ear discharge and trouble swallowing.    Eyes:  Negative for visual disturbance.   Respiratory:  Negative for chest tightness and shortness of breath.    Cardiovascular:  Negative for chest pain.    Gastrointestinal:  Negative for abdominal distention, abdominal pain and nausea.   Musculoskeletal:  Positive for gait problem.   Skin:  Positive for wound. Negative for color change and rash.        Small abrasion on posterior scalp   Neurological:  Negative for dizziness, speech difficulty and weakness.     PHYSICAL EXAM:  PRIMARY SURVEY:  Airway  Airway is patent.     Breathing  Breathing is normal. Right breath sounds are normal. Left breath sounds are normal.     Circulation  Cardiac rhythm is regular. Rate is regular.   Pulses  Radial: 2+ on the right; 2+ on the left.  Femoral: 2+ on the right; 2+ on the left.  Pedal: 2+ on the right; 2+ on the left.    Disability  [place] Coma Score  Eye:4   Verbal:5   Motor:6      15       Motor Strength   strength:  5/5 on the right  5/5 on the left  Dorsiflex strength:  5/5 on the right  5/5 on the left  Plantarflex strength:  5/5 on the right  5/5 on the left  The patient does not have a sensory deficit.       SECONDARY SURVEY/PHYSICAL EXAM:  Physical Exam  Constitutional:       General: He is not in acute distress.     Appearance: Normal appearance. He is not ill-appearing.   HENT:      Head: Normocephalic.      Comments: Abrasion on posterior scalp     Right Ear: External ear normal.      Left Ear: External ear normal.      Nose: Nose normal.      Mouth/Throat:      Mouth: Mucous membranes are dry.      Pharynx: Oropharynx is clear.   Eyes:      Extraocular Movements: Extraocular movements intact.      Conjunctiva/sclera: Conjunctivae normal.      Pupils: Pupils are equal, round, and reactive to light.   Cardiovascular:      Rate and Rhythm: Normal rate and regular rhythm.      Pulses: Normal pulses.   Pulmonary:      Effort: Pulmonary effort is normal.      Breath sounds: Normal breath sounds.   Abdominal:      General: Abdomen is flat. There is no distension.      Palpations: Abdomen is soft.      Tenderness: There is no abdominal tenderness.   Musculoskeletal:          General: No tenderness. Normal range of motion.      Cervical back: Normal range of motion and neck supple. No rigidity or tenderness.   Skin:     General: Skin is warm and dry.   Neurological:      General: No focal deficit present.      Mental Status: He is alert and oriented to person, place, and time. Mental status is at baseline.   Psychiatric:         Mood and Affect: Mood normal.         Behavior: Behavior normal.       IMAGING SUMMARY:  (summary of findings, not a copy of dictation)  CT head W O contrast: Trace acute subarachnoid hemorrhage involving the left frontal lobe anteriorly  CT Cervical Spine: No fracture or subluxation    LABS:  Results from last 7 days   Lab Units 02/11/24  1316   WBC AUTO x10*3/uL 7.5   HEMOGLOBIN g/dL 13.0*   HEMATOCRIT % 37.2*   PLATELETS AUTO x10*3/uL 201   NEUTROS PCT AUTO % 61.0   LYMPHS PCT AUTO % 25.7   MONOS PCT AUTO % 10.5   EOS PCT AUTO % 1.3     Results from last 7 days   Lab Units 02/11/24  1316   INR  1.7*     Results from last 7 days   Lab Units 02/11/24  1316   SODIUM mmol/L 137   POTASSIUM mmol/L 3.7   CHLORIDE mmol/L 101   CO2 mmol/L 27   BUN mg/dL 13   CREATININE mg/dL 0.83   CALCIUM mg/dL 9.3   PROTEIN TOTAL g/dL 7.1   BILIRUBIN TOTAL mg/dL 1.0   ALK PHOS U/L 92   ALT U/L 31   AST U/L 29   GLUCOSE mg/dL 197*     Results from last 7 days   Lab Units 02/11/24  1316   BILIRUBIN TOTAL mg/dL 1.0           I have reviewed all laboratory and imaging results ordered/pertinent for this encounter.

## 2024-02-12 ENCOUNTER — APPOINTMENT (OUTPATIENT)
Dept: RADIOLOGY | Facility: HOSPITAL | Age: 84
DRG: 082 | End: 2024-02-12
Payer: MEDICARE

## 2024-02-12 LAB
ALBUMIN SERPL BCP-MCNC: 3.5 G/DL (ref 3.4–5)
ANION GAP SERPL CALC-SCNC: 13 MMOL/L (ref 10–20)
BUN SERPL-MCNC: 10 MG/DL (ref 6–23)
CA-I BLD-SCNC: 1.12 MMOL/L (ref 1.1–1.33)
CALCIUM SERPL-MCNC: 8.8 MG/DL (ref 8.6–10.6)
CFT BLD TEG: 1.1 MIN (ref 0.8–2.1)
CHLORIDE SERPL-SCNC: 105 MMOL/L (ref 98–107)
CLOT ANGLE BLD TEG: 75 DEG (ref 63–78)
CLOT INIT BLD TEG: 5.9 MIN (ref 4.6–9.1)
CLOT INIT P HPASE BLD TEG: 6.4 MIN (ref 4.3–8.3)
CO2 SERPL-SCNC: 27 MMOL/L (ref 21–32)
CREAT SERPL-MCNC: 0.57 MG/DL (ref 0.5–1.3)
EGFRCR SERPLBLD CKD-EPI 2021: >90 ML/MIN/1.73M*2
ERYTHROCYTE [DISTWIDTH] IN BLOOD BY AUTOMATED COUNT: 13.5 % (ref 11.5–14.5)
FIBRINOGEN BLD CALC-MCNC: 381 MG/DL (ref 278–581)
GLUCOSE BLD MANUAL STRIP-MCNC: 101 MG/DL (ref 74–99)
GLUCOSE BLD MANUAL STRIP-MCNC: 110 MG/DL (ref 74–99)
GLUCOSE BLD MANUAL STRIP-MCNC: 111 MG/DL (ref 74–99)
GLUCOSE BLD MANUAL STRIP-MCNC: 111 MG/DL (ref 74–99)
GLUCOSE BLD MANUAL STRIP-MCNC: 120 MG/DL (ref 74–99)
GLUCOSE BLD MANUAL STRIP-MCNC: 123 MG/DL (ref 74–99)
GLUCOSE BLD MANUAL STRIP-MCNC: 125 MG/DL (ref 74–99)
GLUCOSE BLD MANUAL STRIP-MCNC: 70 MG/DL (ref 74–99)
GLUCOSE SERPL-MCNC: 101 MG/DL (ref 74–99)
HCT VFR BLD AUTO: 39.6 % (ref 41–52)
HGB BLD-MCNC: 13.7 G/DL (ref 13.5–17.5)
MAGNESIUM SERPL-MCNC: 1.45 MG/DL (ref 1.6–2.4)
MCF BLD TEG: 21 MM (ref 15–32)
MCF BLD TEG: 62 MM (ref 52–69)
MCF BLD TEG: 62 MM (ref 52–70)
MCH RBC QN AUTO: 33 PG (ref 26–34)
MCHC RBC AUTO-ENTMCNC: 34.6 G/DL (ref 32–36)
MCV RBC AUTO: 95 FL (ref 80–100)
NRBC BLD-RTO: 0 /100 WBCS (ref 0–0)
PHOSPHATE SERPL-MCNC: 3.6 MG/DL (ref 2.5–4.9)
PLATELET # BLD AUTO: 203 X10*3/UL (ref 150–450)
POTASSIUM SERPL-SCNC: 3.6 MMOL/L (ref 3.5–5.3)
Q ONSET: 223 MS
QRS COUNT: 12 BEATS
QRS DURATION: 102 MS
QT INTERVAL: 388 MS
QTC CALCULATION(BAZETT): 433 MS
QTC FREDERICIA: 417 MS
R AXIS: 31 DEGREES
RBC # BLD AUTO: 4.15 X10*6/UL (ref 4.5–5.9)
SODIUM SERPL-SCNC: 141 MMOL/L (ref 136–145)
T AXIS: 52 DEGREES
T OFFSET: 417 MS
VENTRICULAR RATE: 75 BPM
WBC # BLD AUTO: 11 X10*3/UL (ref 4.4–11.3)

## 2024-02-12 PROCEDURE — 2500000002 HC RX 250 W HCPCS SELF ADMINISTERED DRUGS (ALT 637 FOR MEDICARE OP, ALT 636 FOR OP/ED): Performed by: NURSE PRACTITIONER

## 2024-02-12 PROCEDURE — 2500000001 HC RX 250 WO HCPCS SELF ADMINISTERED DRUGS (ALT 637 FOR MEDICARE OP): Performed by: NURSE PRACTITIONER

## 2024-02-12 PROCEDURE — 82947 ASSAY GLUCOSE BLOOD QUANT: CPT

## 2024-02-12 PROCEDURE — 70450 CT HEAD/BRAIN W/O DYE: CPT

## 2024-02-12 PROCEDURE — 70450 CT HEAD/BRAIN W/O DYE: CPT | Performed by: RADIOLOGY

## 2024-02-12 PROCEDURE — 80069 RENAL FUNCTION PANEL: CPT | Performed by: NURSE PRACTITIONER

## 2024-02-12 PROCEDURE — 2500000004 HC RX 250 GENERAL PHARMACY W/ HCPCS (ALT 636 FOR OP/ED): Performed by: STUDENT IN AN ORGANIZED HEALTH CARE EDUCATION/TRAINING PROGRAM

## 2024-02-12 PROCEDURE — 36600 WITHDRAWAL OF ARTERIAL BLOOD: CPT

## 2024-02-12 PROCEDURE — 82330 ASSAY OF CALCIUM: CPT | Performed by: NURSE PRACTITIONER

## 2024-02-12 PROCEDURE — 2020000001 HC ICU ROOM DAILY

## 2024-02-12 PROCEDURE — 97161 PT EVAL LOW COMPLEX 20 MIN: CPT | Mod: GP

## 2024-02-12 PROCEDURE — 85027 COMPLETE CBC AUTOMATED: CPT | Performed by: NURSE PRACTITIONER

## 2024-02-12 PROCEDURE — 36415 COLL VENOUS BLD VENIPUNCTURE: CPT | Performed by: NURSE PRACTITIONER

## 2024-02-12 PROCEDURE — 2500000004 HC RX 250 GENERAL PHARMACY W/ HCPCS (ALT 636 FOR OP/ED): Performed by: NURSE PRACTITIONER

## 2024-02-12 PROCEDURE — 99232 SBSQ HOSP IP/OBS MODERATE 35: CPT | Performed by: STUDENT IN AN ORGANIZED HEALTH CARE EDUCATION/TRAINING PROGRAM

## 2024-02-12 PROCEDURE — 2500000004 HC RX 250 GENERAL PHARMACY W/ HCPCS (ALT 636 FOR OP/ED)

## 2024-02-12 PROCEDURE — 83735 ASSAY OF MAGNESIUM: CPT | Performed by: NURSE PRACTITIONER

## 2024-02-12 PROCEDURE — 99291 CRITICAL CARE FIRST HOUR: CPT | Performed by: STUDENT IN AN ORGANIZED HEALTH CARE EDUCATION/TRAINING PROGRAM

## 2024-02-12 PROCEDURE — 85384 FIBRINOGEN ACTIVITY: CPT

## 2024-02-12 RX ORDER — HALOPERIDOL 5 MG/ML
5 INJECTION INTRAMUSCULAR EVERY 4 HOURS PRN
Status: DISCONTINUED | OUTPATIENT
Start: 2024-02-12 | End: 2024-02-13

## 2024-02-12 RX ORDER — QUETIAPINE FUMARATE 50 MG/1
50 TABLET, FILM COATED ORAL ONCE
Status: COMPLETED | OUTPATIENT
Start: 2024-02-12 | End: 2024-02-12

## 2024-02-12 RX ORDER — SODIUM CHLORIDE 9 MG/ML
75 INJECTION, SOLUTION INTRAVENOUS CONTINUOUS
Status: DISCONTINUED | OUTPATIENT
Start: 2024-02-12 | End: 2024-02-15

## 2024-02-12 RX ORDER — HALOPERIDOL 5 MG/ML
INJECTION INTRAMUSCULAR
Status: COMPLETED
Start: 2024-02-12 | End: 2024-02-12

## 2024-02-12 RX ORDER — ONDANSETRON 4 MG/1
TABLET, ORALLY DISINTEGRATING ORAL
Status: DISPENSED
Start: 2024-02-12 | End: 2024-02-13

## 2024-02-12 RX ORDER — HALOPERIDOL 5 MG/ML
2.5 INJECTION INTRAMUSCULAR ONCE
Status: COMPLETED | OUTPATIENT
Start: 2024-02-12 | End: 2024-02-12

## 2024-02-12 RX ORDER — HALOPERIDOL 5 MG/ML
2.5 INJECTION INTRAMUSCULAR EVERY 6 HOURS PRN
Status: DISCONTINUED | OUTPATIENT
Start: 2024-02-12 | End: 2024-02-12

## 2024-02-12 RX ORDER — POTASSIUM CHLORIDE 14.9 MG/ML
20 INJECTION INTRAVENOUS ONCE
Status: COMPLETED | OUTPATIENT
Start: 2024-02-12 | End: 2024-02-12

## 2024-02-12 RX ORDER — MAGNESIUM SULFATE HEPTAHYDRATE 40 MG/ML
2 INJECTION, SOLUTION INTRAVENOUS ONCE
Status: COMPLETED | OUTPATIENT
Start: 2024-02-12 | End: 2024-02-12

## 2024-02-12 RX ADMIN — POTASSIUM CHLORIDE 20 MEQ: 14.9 INJECTION, SOLUTION INTRAVENOUS at 04:22

## 2024-02-12 RX ADMIN — SODIUM CHLORIDE 75 ML/HR: 9 INJECTION, SOLUTION INTRAVENOUS at 20:20

## 2024-02-12 RX ADMIN — LEVETIRACETAM 500 MG: 5 INJECTION INTRAVENOUS at 08:16

## 2024-02-12 RX ADMIN — QUETIAPINE FUMARATE 50 MG: 50 TABLET ORAL at 19:27

## 2024-02-12 RX ADMIN — MAGNESIUM SULFATE HEPTAHYDRATE 2 G: 40 INJECTION, SOLUTION INTRAVENOUS at 04:22

## 2024-02-12 RX ADMIN — ATORVASTATIN CALCIUM 80 MG: 80 TABLET, FILM COATED ORAL at 20:54

## 2024-02-12 RX ADMIN — LEVETIRACETAM 500 MG: 5 INJECTION INTRAVENOUS at 20:54

## 2024-02-12 RX ADMIN — METOPROLOL TARTRATE 25 MG: 25 TABLET, FILM COATED ORAL at 08:15

## 2024-02-12 RX ADMIN — HALOPERIDOL LACTATE 2.5 MG: 5 INJECTION, SOLUTION INTRAMUSCULAR at 18:48

## 2024-02-12 RX ADMIN — METOPROLOL TARTRATE 25 MG: 25 TABLET, FILM COATED ORAL at 20:54

## 2024-02-12 RX ADMIN — HALOPERIDOL 2.5 MG: 5 INJECTION INTRAMUSCULAR at 18:48

## 2024-02-12 RX ADMIN — SODIUM CHLORIDE 125 ML/HR: 9 INJECTION, SOLUTION INTRAVENOUS at 11:27

## 2024-02-12 RX ADMIN — HALOPERIDOL LACTATE 5 MG: 5 INJECTION, SOLUTION INTRAMUSCULAR at 22:27

## 2024-02-12 RX ADMIN — HALOPERIDOL LACTATE 2.5 MG: 5 INJECTION, SOLUTION INTRAMUSCULAR at 17:42

## 2024-02-12 SDOH — SOCIAL STABILITY: SOCIAL INSECURITY: HAS ANYONE EVER THREATENED TO HURT YOUR FAMILY OR YOUR PETS?: NO

## 2024-02-12 SDOH — SOCIAL STABILITY: SOCIAL INSECURITY: ABUSE: ADULT

## 2024-02-12 SDOH — ECONOMIC STABILITY: TRANSPORTATION INSECURITY
IN THE PAST 12 MONTHS, HAS LACK OF TRANSPORTATION KEPT YOU FROM MEETINGS, WORK, OR FROM GETTING THINGS NEEDED FOR DAILY LIVING?: NO

## 2024-02-12 SDOH — ECONOMIC STABILITY: HOUSING INSECURITY: IN THE LAST 12 MONTHS, HOW MANY PLACES HAVE YOU LIVED?: 1

## 2024-02-12 SDOH — ECONOMIC STABILITY: TRANSPORTATION INSECURITY
IN THE PAST 12 MONTHS, HAS THE LACK OF TRANSPORTATION KEPT YOU FROM MEDICAL APPOINTMENTS OR FROM GETTING MEDICATIONS?: NO

## 2024-02-12 SDOH — ECONOMIC STABILITY: INCOME INSECURITY: IN THE LAST 12 MONTHS, WAS THERE A TIME WHEN YOU WERE NOT ABLE TO PAY THE MORTGAGE OR RENT ON TIME?: NO

## 2024-02-12 SDOH — ECONOMIC STABILITY: INCOME INSECURITY: HOW HARD IS IT FOR YOU TO PAY FOR THE VERY BASICS LIKE FOOD, HOUSING, MEDICAL CARE, AND HEATING?: NOT VERY HARD

## 2024-02-12 SDOH — ECONOMIC STABILITY: HOUSING INSECURITY
IN THE LAST 12 MONTHS, WAS THERE A TIME WHEN YOU DID NOT HAVE A STEADY PLACE TO SLEEP OR SLEPT IN A SHELTER (INCLUDING NOW)?: NO

## 2024-02-12 SDOH — SOCIAL STABILITY: SOCIAL INSECURITY: DO YOU FEEL ANYONE HAS EXPLOITED OR TAKEN ADVANTAGE OF YOU FINANCIALLY OR OF YOUR PERSONAL PROPERTY?: NO

## 2024-02-12 SDOH — SOCIAL STABILITY: SOCIAL INSECURITY: HAVE YOU HAD THOUGHTS OF HARMING ANYONE ELSE?: NO

## 2024-02-12 SDOH — SOCIAL STABILITY: SOCIAL INSECURITY: DO YOU FEEL UNSAFE GOING BACK TO THE PLACE WHERE YOU ARE LIVING?: NO

## 2024-02-12 SDOH — SOCIAL STABILITY: SOCIAL INSECURITY: ARE THERE ANY APPARENT SIGNS OF INJURIES/BEHAVIORS THAT COULD BE RELATED TO ABUSE/NEGLECT?: NO

## 2024-02-12 SDOH — SOCIAL STABILITY: SOCIAL INSECURITY: DOES ANYONE TRY TO KEEP YOU FROM HAVING/CONTACTING OTHER FRIENDS OR DOING THINGS OUTSIDE YOUR HOME?: NO

## 2024-02-12 SDOH — SOCIAL STABILITY: SOCIAL INSECURITY: ARE YOU OR HAVE YOU BEEN THREATENED OR ABUSED PHYSICALLY, EMOTIONALLY, OR SEXUALLY BY ANYONE?: NO

## 2024-02-12 ASSESSMENT — COGNITIVE AND FUNCTIONAL STATUS - GENERAL
MOBILITY SCORE: 14
STANDING UP FROM CHAIR USING ARMS: A LOT
PATIENT BASELINE BEDBOUND: NO
CLIMB 3 TO 5 STEPS WITH RAILING: TOTAL
DRESSING REGULAR LOWER BODY CLOTHING: A LITTLE
TOILETING: A LITTLE
DAILY ACTIVITIY SCORE: 18
MOBILITY SCORE: 19
MOVING TO AND FROM BED TO CHAIR: A LITTLE
WALKING IN HOSPITAL ROOM: A LITTLE
MOVING FROM LYING ON BACK TO SITTING ON SIDE OF FLAT BED WITH BEDRAILS: A LITTLE
CLIMB 3 TO 5 STEPS WITH RAILING: A LITTLE
DRESSING REGULAR UPPER BODY CLOTHING: A LITTLE
TURNING FROM BACK TO SIDE WHILE IN FLAT BAD: A LITTLE
EATING MEALS: A LITTLE
STANDING UP FROM CHAIR USING ARMS: A LITTLE
WALKING IN HOSPITAL ROOM: A LOT
PERSONAL GROOMING: A LITTLE
MOVING TO AND FROM BED TO CHAIR: A LITTLE
HELP NEEDED FOR BATHING: A LITTLE
TURNING FROM BACK TO SIDE WHILE IN FLAT BAD: A LITTLE

## 2024-02-12 ASSESSMENT — ACTIVITIES OF DAILY LIVING (ADL)
TOILETING: NEEDS ASSISTANCE
WALKS IN HOME: NEEDS ASSISTANCE
GROOMING: NEEDS ASSISTANCE
ADEQUATE_TO_COMPLETE_ADL: YES
ADL_ASSISTANCE: INDEPENDENT
FEEDING YOURSELF: INDEPENDENT
BATHING: NEEDS ASSISTANCE
PATIENT'S MEMORY ADEQUATE TO SAFELY COMPLETE DAILY ACTIVITIES?: NO
JUDGMENT_ADEQUATE_SAFELY_COMPLETE_DAILY_ACTIVITIES: NO
LACK_OF_TRANSPORTATION: NO
LACK_OF_TRANSPORTATION: NO
DRESSING YOURSELF: NEEDS ASSISTANCE
HEARING - LEFT EAR: DIFFICULTY WITH NOISE
HEARING - RIGHT EAR: DIFFICULTY WITH NOISE

## 2024-02-12 ASSESSMENT — PATIENT HEALTH QUESTIONNAIRE - PHQ9
2. FEELING DOWN, DEPRESSED OR HOPELESS: NOT AT ALL
SUM OF ALL RESPONSES TO PHQ9 QUESTIONS 1 & 2: 0
1. LITTLE INTEREST OR PLEASURE IN DOING THINGS: NOT AT ALL

## 2024-02-12 ASSESSMENT — PAIN SCALES - GENERAL
PAINLEVEL_OUTOF10: 0 - NO PAIN

## 2024-02-12 ASSESSMENT — PAIN - FUNCTIONAL ASSESSMENT
PAIN_FUNCTIONAL_ASSESSMENT: 0-10

## 2024-02-12 ASSESSMENT — LIFESTYLE VARIABLES
AUDIT-C TOTAL SCORE: 0
AUDIT-C TOTAL SCORE: 0
SKIP TO QUESTIONS 9-10: 1
HOW OFTEN DO YOU HAVE A DRINK CONTAINING ALCOHOL: NEVER
HOW MANY STANDARD DRINKS CONTAINING ALCOHOL DO YOU HAVE ON A TYPICAL DAY: PATIENT DOES NOT DRINK
HOW OFTEN DO YOU HAVE 6 OR MORE DRINKS ON ONE OCCASION: NEVER

## 2024-02-12 NOTE — H&P
[facility]  TRAUMA SERVICE - HISTORY AND PHYSICAL / CONSULT     Patient Name: Johnnie Rudolph  MRN: [id number]  Admit Date: 211  : 1940                      AGE: 83 y.o.                             GENDER: male  ==============================================================================  MECHANISM OF INJURY / CHIEF COMPLAINT:   82 yo M who fell down 3 steps and bumped the back of his head. Unsure if he lost consciousness or not. Patient currently takes Eliquis for ischemic stroke in 10/23. Patient has history of falls.     LOC (yes/no?): Unknown  Anticoagulant / Anti-platelet Rx? (for what dx?): Eliquis (Afib)  Referring Facility Name (N/A for scene EMR run): [facility]     INJURIES:   Trace acute subarachnoid hemorrhage involving L frontal lobe     OTHER MEDICAL PROBLEMS:  Ischemic stroke (10/23)  HTN  A-Fib  T2DM  Glaucoma     INCIDENTAL FINDINGS:  N/A     ==============================================================================  ADMISSION PLAN OF CARE:  82 yo M who fell down 3 steps and bumped the back of his head around 12 pm today. Unknown loss of consciousness. Currently takes Eliquis for history of Afib. Patient with recent ischemic stroke 10/23. Patient has had several falls in the past. Only symptom following the fall was confusion. Patient presented to [facility] and underwent imaging which demonstrated trace L frontal subarachnoid hemorrhage without signs of mass effect. Patient transferred to [facility] for evaluation by trauma and neurosurgery. Patient is asymptomatic from the fall aside from confusion and minor abrasion. He has no focal deficits.      #Trace, Acute Left Frontal Subarachnoid Hemorrhage  - Recommend neurosurgery evaluation   - Hold anticoagulation, can resume per NSGY protocol  - Recommend admission to the CDU for close clinical monitoring  - Repeat CT scan in 4 hours, assess size of bleed  - No other traumatic injuries observed      -----------------------------------------------------------------  ADDENDUM 1811  -Repeat head CT on arrival shows moderate/large increase in size of left frontal subarachnoid hemorrhage.  - Plan for admission to ICU for close monitoring  - Repeat CT in 6 hours.     -Updated clinical events relayed to attending Dr. Ugalde.     Patient discussed with attending physician, Dr. Jessica Bruce,   Department of Surgery /  Integrated PGY1  Trauma [id number]  =============================================================================  PAST MEDICAL HISTORY:   PMH:   Medical History        Past Medical History:   Diagnosis Date    Body mass index (BMI) 29.0-29.9, adult 12/11/2020     Body mass index (BMI) of 29.0 to 29.9 in adult    Body mass index (BMI) 31.0-31.9, adult 04/26/2019     BMI 31.0-31.9,adult    Contact with and (suspected) exposure to potentially hazardous body fluids 04/02/2018     Exposure to blood    Disorder of male genital organs, unspecified 04/02/2018     Disorder of male genital organs    History of falling 01/16/2015     History of fall    Laceration without foreign body of unspecified part of head, initial encounter 05/15/2018     Laceration of head    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     Nontraumatic chronic subdural hemorrhage    Other cranial cerebrospinal fluid leak       Subdural hygroma    Other fecal abnormalities 05/15/2017     Occult blood positive stool    Other symptoms and signs involving cognitive functions and awareness 08/03/2015     Cognitive impairment    Pain in left shoulder 08/08/2016     Acute pain of left shoulder    Personal history of other diseases of the musculoskeletal system and connective tissue 11/04/2014     History of back pain    Postconcussional syndrome 08/03/2015     Post concussion syndrome         Last menstrual period: N/A     PSH:   Surgical History         Past Surgical History:   Procedure Laterality Date    HERNIA REPAIR    06/02/2014     Hernia Repair    KNEE SURGERY   06/02/2014     Knee Surgery    MR HEAD ANGIO WO IV CONTRAST   10/13/2023     MR HEAD ANGIO WO IV CONTRAST 10/13/2023 [facility]    MR NECK ANGIO WO IV CONTRAST   10/13/2023     MR NECK ANGIO WO IV CONTRAST 10/13/2023 [facility]    OTHER SURGICAL HISTORY   03/25/2022     Inguinal hernia repair laparoscopic         FH:   Family History          Family History   Problem Relation Name Age of Onset    Skin cancer Father        Hypertension Son        Diabetes Other        Prostate cancer Other             SOCIAL HISTORY:    Smoking:   Social History          Tobacco Use   Smoking Status Never   Smokeless Tobacco Never        Alcohol:   Social History          Substance and Sexual Activity   Alcohol Use Never        Drug use: N/A     MEDICATIONS:           Prior to Admission medications    Medication Sig Start Date End Date Taking? Authorizing Provider   apixaban (Eliquis) 5 mg tablet Take 1 tablet (5 mg) by mouth 2 times a day. 11/28/23 11/27/24   Jason Olvera DO   atorvastatin (Lipitor) 80 mg tablet Take 1 tablet (80 mg) by mouth once daily at bedtime. 11/8/23 11/7/24   Izzy Juarez PA-C   glipiZIDE (Glucotrol) 5 mg tablet Take 1 tablet (5 mg) by mouth 2 times a day. 11/21/23 5/19/24   Jason Olvera DO   lisinopriL-hydrochlorothiazide 20-12.5 mg tablet Take 1 tablet by mouth once daily. 10/19/23 4/16/24   Jason Olvera DO   metFORMIN (Glucophage) 1,000 mg tablet Take 1 tablet (1,000 mg) by mouth 2 times a day with meals. 7/24/23 1/20/24   Jason Olvera DO   metoprolol tartrate (Lopressor) 25 mg tablet Take 1 tablet (25 mg) by mouth 2 times a day. 7/24/23 1/20/24   Jason Olvera DO   rivaroxaban (Xarelto) 20 mg tablet Take 1 tablet (20 mg) by mouth once daily. 11/28/23 11/27/24   Jason Olvera DO      ALLERGIES:   No Known Allergies     REVIEW OF SYSTEMS:  Review of Systems   HENT:  Negative for ear discharge and trouble swallowing.    Eyes:  Negative for visual  disturbance.   Respiratory:  Negative for chest tightness and shortness of breath.    Cardiovascular:  Negative for chest pain.   Gastrointestinal:  Negative for abdominal distention, abdominal pain and nausea.   Musculoskeletal:  Positive for gait problem.   Skin:  Positive for wound. Negative for color change and rash.        Small abrasion on posterior scalp   Neurological:  Negative for dizziness, speech difficulty and weakness.      PHYSICAL EXAM:  PRIMARY SURVEY:  Airway  Airway is patent.      Breathing  Breathing is normal. Right breath sounds are normal. Left breath sounds are normal.      Circulation  Cardiac rhythm is regular. Rate is regular.   Pulses  Radial: 2+ on the right; 2+ on the left.  Femoral: 2+ on the right; 2+ on the left.  Pedal: 2+ on the right; 2+ on the left.     Disability  [place] Coma Score  Eye:4   Verbal:5   Motor:6      15       Motor Strength   strength:  5/5 on the right  5/5 on the left  Dorsiflex strength:  5/5 on the right  5/5 on the left  Plantarflex strength:  5/5 on the right  5/5 on the left  The patient does not have a sensory deficit.         SECONDARY SURVEY/PHYSICAL EXAM:  Physical Exam  Constitutional:       General: He is not in acute distress.     Appearance: Normal appearance. He is not ill-appearing.   HENT:      Head: Normocephalic.      Comments: Abrasion on posterior scalp     Right Ear: External ear normal.      Left Ear: External ear normal.      Nose: Nose normal.      Mouth/Throat:      Mouth: Mucous membranes are dry.      Pharynx: Oropharynx is clear.   Eyes:      Extraocular Movements: Extraocular movements intact.      Conjunctiva/sclera: Conjunctivae normal.      Pupils: Pupils are equal, round, and reactive to light.   Cardiovascular:      Rate and Rhythm: Normal rate and regular rhythm.      Pulses: Normal pulses.   Pulmonary:      Effort: Pulmonary effort is normal.      Breath sounds: Normal breath sounds.   Abdominal:      General: Abdomen is  flat. There is no distension.      Palpations: Abdomen is soft.      Tenderness: There is no abdominal tenderness.   Musculoskeletal:         General: No tenderness. Normal range of motion.      Cervical back: Normal range of motion and neck supple. No rigidity or tenderness.   Skin:     General: Skin is warm and dry.   Neurological:      General: No focal deficit present.      Mental Status: He is alert and oriented to person, place, and time. Mental status is at baseline.   Psychiatric:         Mood and Affect: Mood normal.         Behavior: Behavior normal.         IMAGING SUMMARY:  (summary of findings, not a copy of dictation)  CT head W O contrast: Trace acute subarachnoid hemorrhage involving the left frontal lobe anteriorly  CT Cervical Spine: No fracture or subluxation     LABS:       Results from last 7 days   Lab Units 02/11/24  1316   WBC AUTO x10*3/uL 7.5   HEMOGLOBIN g/dL 13.0*   HEMATOCRIT % 37.2*   PLATELETS AUTO x10*3/uL 201   NEUTROS PCT AUTO % 61.0   LYMPHS PCT AUTO % 25.7   MONOS PCT AUTO % 10.5   EOS PCT AUTO % 1.3           Results from last 7 days   Lab Units 02/11/24  1316   INR   1.7*           Results from last 7 days   Lab Units 02/11/24  1316   SODIUM mmol/L 137   POTASSIUM mmol/L 3.7   CHLORIDE mmol/L 101   CO2 mmol/L 27   BUN mg/dL 13   CREATININE mg/dL 0.83   CALCIUM mg/dL 9.3   PROTEIN TOTAL g/dL 7.1   BILIRUBIN TOTAL mg/dL 1.0   ALK PHOS U/L 92   ALT U/L 31   AST U/L 29   GLUCOSE mg/dL 197*           Results from last 7 days   Lab Units 02/11/24  1316   BILIRUBIN TOTAL mg/dL 1.0             I have reviewed all laboratory and imaging results ordered/pertinent for this encounter.                    Revision History    I saw and evaluated the patient. I personally obtained the key and critical portions of the history and physical exam. I reviewed the resident’s documentation and discussed the patient with the resident. I agree with the resident’s medical decision making as documented in  the resident’s note.    83M Afib (Eliquis) who feel down three steps earlier today with ?LOC. Initially presented to [facility] and found to have trace L frontal SAH with GCS 15. Plan had been observation with repeat CT. On my review of the repeat scan, there is significant worsening of the L frontal SAH, and a new left frontal hemorrhage. He remains GCS 15 on my exam, Aox2 (knew month but not year). Family at bedside feels that he is at neurologic baseline. He is getting Andexanet for reversal and we will admit to the ICU for close monitoring. NSG aware of the worsened scan. Plan for repeat CT in 4-6h.     Damon Ugalde MD  Trauma, Critical Care, and Acute Care Surgery  Cell: [phone]  Pager: [phone]

## 2024-02-12 NOTE — ED PROVIDER NOTES
CC: Fall     HPI:  83-year-old male with history of A-fib on Xarelto presents to the emergency department as transfer from outside ED with concern for trace subarachnoid hemorrhage in the setting of recent fall on anticoagulation.  Patient had a fall backwards earlier today.  CT imaging at outside hospital with a trace left frontal subarachnoid.  Had initially planned for 6-hour stability scan at that facility, however given that he was on anticoagulation, sent here for evaluation by trauma and neurosurgery.  Patient denies any pain currently and is otherwise asymptomatic.  No other injuries.    Records Reviewed:  Recent available ED and inpatient notes reviewed in EMR.    PMHx/PSHx:  Per HPI.   - has a past medical history of Body mass index (BMI) 29.0-29.9, adult, Body mass index (BMI) 31.0-31.9, adult, Contact with and (suspected) exposure to potentially hazardous body fluids, Disorder of male genital organs, unspecified, History of falling, Laceration without foreign body of unspecified part of head, initial encounter, Nontraumatic chronic subdural hemorrhage (CMS/HCC), Other cranial cerebrospinal fluid leak, Other fecal abnormalities, Other symptoms and signs involving cognitive functions and awareness, Pain in left shoulder, Personal history of other diseases of the musculoskeletal system and connective tissue, and Postconcussional syndrome.  - has a past surgical history that includes Knee surgery (06/02/2014); Hernia repair (06/02/2014); Other surgical history (03/25/2022); MR angio head wo IV contrast (10/13/2023); and MR angio neck wo IV contrast (10/13/2023).  - has Abnormal weight loss; Actinic keratoses; Benign essential hypertension; Cataracts, bilateral; Disorder of joint of pelvic region and thigh; Elevated low density lipoprotein (LDL) cholesterol level; Epidermal cyst; Glaucoma; Hypomagnesemia; Inguinal hernia, left; Leg pain, left; Leukocytosis; Lower leg edema; Malignant neoplasm of prostate  (CMS/HCC); Memory dysfunction; Poor memory; Parkinson's disease; Persistent atrial fibrillation (CMS/HCC); Polyneuropathy, diabetic (CMS/HCC); Tinea pedis of both feet; Type 2 diabetes mellitus with hyperglycemia, without long-term current use of insulin (CMS/HCC); Ulnar nerve palsy; Vitamin B12 deficiency; Vitamin D deficiency; Varicose vein of leg; Squamous cell carcinoma of nose; Basal cell carcinoma of right side of nose; Cat scratch of right lower leg; Cerebrovascular accident (CVA) due to embolism of left middle cerebral artery (CMS/HCC); and Intraparenchymal hemorrhage of brain (CMS/HCC) on their problem list.    Medications:  Reviewed in EMR. See EMR for complete list of medications and doses.    Allergies:  Patient has no known allergies.    Social History:  - Tobacco:  reports that he has never smoked. He has never used smokeless tobacco.   - Alcohol:  reports no history of alcohol use.   - Illicit Drugs:  reports no history of drug use.     ROS:  Per HPI.       ???????????????????????????????????????????????????????????????  Triage Vitals:  T 36.8 °C (98.3 °F)  HR 94  BP (!) 153/101  RR 16  O2 96 % None (Room air)    Physical Exam  Vitals and nursing note reviewed.   Constitutional:       General: He is not in acute distress.  HENT:      Head: Atraumatic.   Eyes:      General: No scleral icterus.     Conjunctiva/sclera: Conjunctivae normal.   Cardiovascular:      Rate and Rhythm: Normal rate. Rhythm irregular.      Heart sounds: No murmur heard.     No friction rub. No gallop.   Pulmonary:      Effort: No respiratory distress.      Breath sounds: Normal breath sounds. No wheezing or rales.   Chest:      Chest wall: No tenderness.   Abdominal:      Palpations: Abdomen is soft.      Tenderness: There is no abdominal tenderness.   Musculoskeletal:      Right lower leg: No edema.      Left lower leg: No edema.   Skin:     General: Skin is warm and dry.      Findings: No rash.   Neurological:      Mental  Status: He is alert.      Cranial Nerves: No facial asymmetry.      Comments: 5 out of 5 strength proximal and distal muscle distributions of bilateral upper and lower extremities.  Normal finger-to-nose bilaterally.  Normal sensation to light touch to bilateral face, hands, and legs.  Symmetrical smile, symmetrical eyebrow elevation, symmetrical eye closure.  Tongue protrudes midline.   Psychiatric:         Mood and Affect: Mood normal.         Behavior: Behavior normal.       ???????????????????????????????????????????????????????????????  Assessment and Plan:  83-year-old male presents to the emergency department with concern for subarachnoid hemorrhage on anticoagulation.  Discussed possibility of anticoagulation reversal with neurosurgery, however is asymptomatic and has only trace subarachnoid.  They do recommend an accelerated stability scan, which was ordered for 4 hours after the initial scan.    ED Course:  CT stability scan with significant worsening of a now intraparenchymal hemorrhage with trace subdural.  The patient was ordered Andexxa for reversal.  Continues to be largely asymptomatic with only slight headache on reevaluation.  Was ordered Keppra for seizure prophylaxis.  Was admitted to trauma ICU for close monitoring and further management of an acute intracranial bleed on anticoagulation.    Social Determinants Limiting Care:  None identified    Disposition:  Admit to TICU    --  Jeancarlos Rogel MD  Emergency Medicine, PGY-3      Diagnoses as of 02/11/24 1946   Intraparenchymal hemorrhage of brain (CMS/HCC)   Subdural hematoma (CMS/HCC)     Procedures ? SmartLinks last updated 2/11/2024 7:46 PM         Jeancarlos Rogel MD  Resident  02/11/24 1951

## 2024-02-12 NOTE — PROGRESS NOTES
Physical Therapy    Physical Therapy Evaluation    Patient Name: Johnnie Rudolph  MRN: [id number]  Today's Date: 2/12/2024   Time Calculation  Start Time: 1352  Stop Time: 1413  Time Calculation (min): 21 min    Assessment/Plan   PT Assessment  PT Assessment Results: Decreased endurance, Impaired balance, Decreased mobility, Decreased coordination, Decreased cognition  Rehab Prognosis: Excellent  Evaluation/Treatment Tolerance: Patient limited by fatigue  Medical Staff Made Aware: Yes  Strengths: Premorbid level of function, Support of Caregivers  Barriers to Participation: Housing layout  End of Session Communication: Bedside nurse  Assessment Comment: Pt presents with deficits in cognition, balance, coordination, endurance, and overall functional mobility. Pt will benefit from continued skilled PT to address deficits and facilitate return to PLOF.  End of Session Patient Position: Bed, 3 rail up, Alarm on  IP OR SWING BED PT PLAN  Inpatient or Swing Bed: Inpatient  PT Plan  Treatment/Interventions: Bed mobility, Transfer training, Gait training, Stair training, Balance training, Neuromuscular re-education, Endurance training, Strengthening, Therapeutic exercise, Therapeutic activity  PT Plan: Skilled PT  PT Frequency: 5 times per week  PT Discharge Recommendations: High intensity level of continued care  Equipment Recommended upon Discharge: Wheeled walker  PT Recommended Transfer Status: Assist x2  PT - OK to Discharge: Yes      Subjective   General Visit Information:  Reason for Referral: fall down steps; injuries: Trace acute SAH, left frontal lobe  Past Medical History Relevant to Rehab: 1. Ischemic stroke (10/23)  2. HTN  3. A fib   4. DM II  5. Glaucoma  Prior to Session Communication: Bedside nurse  Patient Position Received: Bed, 3 rail up  General Comment: Pt drowsy but agreeable to PT. Low speech clarity, aphasic?   Home Living:  Home Living  Type of Home: House  Lives With: Significant other  Home Adaptive  Equipment: Cane  Home Layout: One level, Work area in basement, Able to live on main level with bedroom/bathroom  Home Access: Stairs to enter with rails  Entrance Stairs-Rails: Both  Entrance Stairs-Number of Steps: 4  Bathroom Shower/Tub: Tub/shower unit  Bathroom Equipment: None  Prior Level of Function:  Prior Function Per Pt/Caregiver Report  Level of [place]: Independent with ADLs and functional transfers, Independent with homemaking with ambulation  ADL Assistance: Independent  Homemaking Assistance: Independent  Ambulatory Assistance: Independent  Leisure: watching TV  Hand Dominance: Right  Prior Function Comments: (+) drives  Precautions:  Precautions  Medical Precautions: Fall precautions  Vital Signs:  Vital Signs  Heart Rate:  (56-63)  Heart Rate Source: Monitor  Resp: 18 (POST: 20)  SpO2: 93 % (POST: 91)  BP: (!) 128/95 (POST: 134/66 (86))  MAP (mmHg): 103  BP Location: Right arm  BP Method: Automatic    Objective   Lines/Tubes/Drains:  External Urinary Catheter (Active)   Number of days: 0     Continuous Medications/Drips:  sodium chloride 0.9%, 125 mL/hr, Last Rate: 125 mL/hr (02/12/24 1127)      Oxygen:    Pain:  Pain Assessment  Pain Assessment: 0-10  Pain Score: 0 - No pain  Cognition:  Cognition  Overall Cognitive Status: Impaired  Arousal/Alertness: Appropriate responses to stimuli (eyes closed 75% of session)  Orientation Level: Disoriented to time  Following Commands: Follows one step commands with increased time  Attention: Exceptions to WFL  Processing Speed: Delayed      Extremity/Trunk Assessments:  Strength:     RUE   RUE : Exceptions to WFL  RUE Strength  RUE Overall Strength: Greater than or equal to 3/5 as evidenced by functional mobility (decreased R shoulder AROM (<110 degrees flexion))  LUE   LUE: Within Functional Limits  RLE   RLE : Within Functional Limits  LLE   LLE : Within Functional Limits    General Assessments:         Activity Tolerance  Endurance: Tolerates 10 - 20  min exercise with multiple rests  Activity Tolerance Comments: Limited by fatigue     Coordination  Movements are Fluid and Coordinated: No  Trunk Coordination: R lean in sitting  Finger to Nose: Intact  Alternating Toe Taps: Intact  Finger to Target: Impaired (RUE>LUE)  Static Sitting Balance  Static Sitting-Balance Support: Feet supported  Static Sitting-Level of Assistance: Contact guard  Static Sitting-Comment/Number of Minutes: 5  Static Standing Balance  Static Standing-Balance Support: Left upper extremity supported  Static Standing-Level of Assistance: Maximum assistance  Static Standing-Comment/Number of Minutes: unable to achieve full upright    Functional Assessments:  Bed Mobility  Bed Mobility: Yes  Bed Mobility 1  Bed Mobility 1: Supine to sitting, Sitting to supine  Level of Assistance 1: Moderate assistance  Bed Mobility Comments 1: HOB elevated, verbal and tactile cueing for sequencing, delayed initiation  Transfers  Transfer: Yes  Transfer 1  Transfer From 1: Sit to, Stand to  Transfer to 1: Stand, Sit  Technique 1: Sit to stand, Stand to sit  Transfer Device 1:  (HHA)  Transfer Level of Assistance 1: Maximum assistance  Trials/Comments 1: x 2 attempts to achieve upright, pt retropulsive and unable to maintain standing  Ambulation/Gait Training  Ambulation/Gait Training Performed: No  Stairs  Stairs: No       Outcome Measures:  [facility] Basic Mobility  Turning from your back to your side while in a flat bed without using bedrails: A little  Moving from lying on your back to sitting on the side of a flat bed without using bedrails: A little  Moving to and from bed to chair (including a wheelchair): A little  Standing up from a chair using your arms (e.g. wheelchair or bedside chair): A lot  To walk in hospital room: A lot  Climbing 3-5 steps with railing: Total  Basic Mobility - Total Score: 14           FSS-ICU  Ambulation: Unable to attempt due to weakness  Rolling: Minimal assistance (performs 75%  or more of task)  Sitting: Minimal assistance (performs 75% or more of task)  Transfer Sit-to-Stand: Maximal assistance (performs 25% - 49% of task)  Transfer Supine-to-Sit: Moderate assistance (performs 50 - 74% of task)  Total Score: 13                   Encounter Problems       Encounter Problems (Active)       Balance       STG - Maintains static standing balance without upper extremity support >3 minutes with Gulshan        Start:  02/12/24    Expected End:  02/26/24            STG - Maintains dynamic sitting balance without upper extremity support CGA > 5 minutes        Start:  02/12/24    Expected End:  02/26/24               Mobility       STG - Patient will ambulate >50ft with Gulshan using LRAD        Start:  02/12/24    Expected End:  02/26/24               Transfers       STG - Patient to transfer to and from sit to supine CGA       Start:  02/12/24    Expected End:  02/26/24            STG - Patient will transfer sit to and from stand CGA        Start:  02/12/24    Expected End:  02/26/24                   Education Documentation  Precautions, taught by Tracee Enrique PT at 2/12/2024  2:42 PM.  Learner: Patient  Readiness: Acceptance  Method: Explanation  Response: Needs Reinforcement    Body Mechanics, taught by Tracee Enrique PT at 2/12/2024  2:42 PM.  Learner: Patient  Readiness: Acceptance  Method: Explanation  Response: Needs Reinforcement    Mobility Training, taught by Tracee Enrique PT at 2/12/2024  2:42 PM.  Learner: Patient  Readiness: Acceptance  Method: Explanation  Response: Needs Reinforcement    Education Comments  No comments found.            02/12/24 at 2:43 PM   Tracee Enrique PT   Rehab Office: [phone]

## 2024-02-12 NOTE — PROGRESS NOTES
PLAN:  ICU due to: ICH and Q1 hour neuro checks. CT head repeat with worsening bleed, given andexa given xarelto usage, last dose 2/11 am.     -NSGY following - appreciate recs  - Repeat Head CT 2/13 in AM  - Continue to hold xarelto  -Continue q1hr neuro checks  -Continue ICU level of care     PAYOR: [employer] medicare     DISPO: TBD    SUPPORT/CONTACT:  Angela (s.o.) [phone], Tara (dtr) [phone]  Met with patient to complete assessment. Per s/o Angela, they live [place] a ranch at address on file. Patient was IPTA, active with PCP ( unsure of replacement provider as PCP retired in January, but will stay with replacement as new PCP). Denies DME, previous IPR stay, will follow and assist as needed.

## 2024-02-12 NOTE — PROGRESS NOTES
"[facility]  TRAUMA - PROGRESS NOTE    Patient Name: Johnnie Rudolph  MRN: [id number]  Admit Date: 211  : 1940  AGE: 83 y.o.   GENDER: male  =================================================================  MECHANISM OF INJURY:   Fall down steps  LOC (yes/no?): Unknown  Anticoagulant / Anti-platelet Rx? (for what dx?): xarelto, AF and ischemic stroke hx (last dose am )  Referring Facility Name (N/A for scene EMR run): [facility]     INJURIES:   Trace acute SAH, left frontal lobe     OTHER MEDICAL PROBLEMS:  Ischemic stroke (10/23)  HTN  A fib   DM II  Glaucoma     INCIDENTAL FINDINGS:  None     PROCEDURES:    =================================================================    TODAY'S ASSESSMENT AND PLAN OF CARE:  Johnnie Rudolph is a 83 y.o. male in the ICU due to: ICH and Q1 hour neuro checks. CT head repeat with worsening bleed, given andexa given xarelto usage, last dose  am.    -NSGY following - appreciate recs  - Repeat Head CT  in AM  - Continue to hold xarelto  -Continue q1hr neuro checks  -Continue ICU level of care     Patient seen and discussed with Attending, Dr. Buenrostro.    Cade Stoll MD  General Surgery Resident  Trauma Surgery    ==============================================================================  CHIEF COMPLAINT / OVERNIGHT EVENTS / HPI:   NAEO    MEDICAL HISTORY / ROS:  Admission history and ROS reviewed. Pertinent changes as follows:      PHYSICAL EXAM:  Heart Rate:  [51-97]   Temp:  [36.4 °C (97.5 °F)-37.1 °C (98.8 °F)]   Resp:  [10-36]   BP: (122-159)/()   Height:  [185.4 cm (6' 0.99\")-185.4 cm (6' 1\")]   Weight:  [91.5 kg (201 lb 11.5 oz)-95.5 kg (210 lb 8.6 oz)]   SpO2:  [90 %-100 %]     General: NAD, awake and alert, conversive  HEENT: Normocephalic, atraumatic  Neuro: A&O x 2 (person and place), GCS 14 E4V4M6  Cardio: Bradycardic episodes, asymptomatic  Resp: Unlabored breathing on RA  Abdomen: Soft, nontender, " nondistended  : External catheter in place draining clear yellow urine  MSK: BAUM, normal ROM. Strength 5/5  Extremities: Normal extremities, no edema or cyanosis  Skin: Warm and dry,  no new lesions or rashes  Psych: Appropriate mood and behavior    IMAGING SUMMARY:  (summary of new imaging findings, not a copy of dictation)  Reviewed     LABS:  Results from last 7 days   Lab Units 02/12/24  0107 02/11/24  1815 02/11/24  1316   WBC AUTO x10*3/uL 11.0 12.9* 7.5   HEMOGLOBIN g/dL 13.7 13.6 13.0*   HEMATOCRIT % 39.6* 37.0* 37.2*   PLATELETS AUTO x10*3/uL 203 208 201   NEUTROS PCT AUTO %  --  79.4 61.0   LYMPHS PCT AUTO %  --  12.1 25.7   MONOS PCT AUTO %  --  7.4 10.5   EOS PCT AUTO %  --  0.3 1.3       Results from last 7 days   Lab Units 02/11/24  1719 02/11/24  1316   APTT seconds 43*  --    INR  2.9* 1.7*       Results from last 7 days   Lab Units 02/12/24  0107 02/11/24  1815 02/11/24  1316   SODIUM mmol/L 141 142 137   POTASSIUM mmol/L 3.6 3.6 3.7   CHLORIDE mmol/L 105 106 101   CO2 mmol/L 27 26 27   BUN mg/dL 10 12 13   CREATININE mg/dL 0.57 0.69 0.83   CALCIUM mg/dL 8.8 9.5 9.3   PROTEIN TOTAL g/dL  --  6.2* 7.1   BILIRUBIN TOTAL mg/dL  --  0.9 1.0   ALK PHOS U/L  --  81 92   ALT U/L  --  27 31   AST U/L  --  34 29   GLUCOSE mg/dL 101* 86 197*       Results from last 7 days   Lab Units 02/11/24  1815 02/11/24  1316   BILIRUBIN TOTAL mg/dL 0.9 1.0           I have reviewed all medications, laboratory results, and imaging pertinent for today's encounter.

## 2024-02-12 NOTE — PROGRESS NOTES
[facility]  TRAUMA ICU - PROGRESS NOTE    Patient Name: Johnnie Rudolph  MRN: [id number]  Admit Date: 211  : 1940  AGE: 83 y.o.   GENDER: male  =================================================================  MECHANISM OF INJURY:   Fall down steps  LOC (yes/no?): Unknown  Anticoagulant / Anti-platelet Rx? (for what dx?): xarelto, AF and ischemic stroke hx (last dose am )  Referring Facility Name (N/A for scene EMR run): [facility]     INJURIES:   Trace acute SAH, left frontal lobe     OTHER MEDICAL PROBLEMS:  Ischemic stroke (10/23)  HTN  A fib   DM II  Glaucoma     INCIDENTAL FINDINGS:  None     PROCEDURES:    =================================================================    TODAY'S ASSESSMENT AND PLAN OF CARE:  Johnnie Rudolph is a 83 y.o. male in the ICU due to: ICH and Q1 hour neuro checks.     NEURO/PAIN/SEDATION:   - ICH, SAH, SDH  - GCS 14, appears baseling  - CT head repeat with worsening bleed, given andexa given xarelto usage, last dose  am  - Repeat CT Head at 7 am - stable  - Discussed with family, he is on xarelto, is not on eliquis  - Q1 hour neuro checks   - Repeat Head CT  in AM    RESPIRATORY:   - No acute issues   - Maintain SpO2 > 92%    CARDIOVASC:   - HTN - resume Lisinopril/hydrochlorothiazide when able  - Home atorvastatin dose restarted  - Afib on Xarelto -reversed and holding  - Continuous telemetry    GI:   - NPO until NSGY recs finalized  - Per NSGY, okay to give diet - Carb controlled diet ordered    :   -  ccs  - Replete electrolytes as needed  - NS @ 125 ml/hr    HEMATOLOGIC:   - Holding home xarelto   - Daily CBC     ENDOCRINE:   - DMII  - SSI while inpatient, takes metformin/glipizide at home   - BG range     MUSCULOSKELETAL/SKIN:   - No acute issues   - On bedrest  - PT/OT when able     INFECTIOUS DISEASE:   - Leukocytosis to 11.0  - reactive from trauma, no infectious concerns     GI PROPHYLAXIS: none  DVT  "PROPHYLAXIS: SCDs BLE, no chemoppx due to brain bleed - may start DVT chemoPPX on AM of 2/14    [facility]  ==============================================================================  CHIEF COMPLAINT / OVERNIGHT EVENTS / HPI:   No new issues after admission to TSICU, neuro checks.     MEDICAL HISTORY / ROS:  Admission history and ROS reviewed. Pertinent changes as follows:      PHYSICAL EXAM:  Heart Rate:  [51-97]   Temp:  [35.4 °C (95.7 °F)-36.8 °C (98.3 °F)]   Resp:  [10-36]   BP: (122-159)/()   Height:  [185.4 cm (6' 0.99\")-185.4 cm (6' 1\")]   Weight:  [91.5 kg (201 lb 11.5 oz)-95.5 kg (210 lb 8.6 oz)]   SpO2:  [90 %-100 %]     General: NAD, awake and alert, conversive  HEENT: Normocephalic, atraumatic  Neuro: A&O x 2 (person and place), GCS 14 E4V4M6  Cardio: Bradycardic episodes, asymptomatic  Resp: Unlabored breathing on RA  Abdomen: Soft, nontender, nondistended  : External catheter in place draining clear yellow urine  MSK: BAUM, normal ROM. Strength 5/5  Extremities: Normal extremities, no edema or cyanosis  Skin: Warm and dry,  no new lesions or rashes  Psych: Appropriate mood and behavior    IMAGING SUMMARY:  (summary of new imaging findings, not a copy of dictation)  Reviewed     LABS:  Results from last 7 days   Lab Units 02/12/24  0107 02/11/24  1815 02/11/24  1316   WBC AUTO x10*3/uL 11.0 12.9* 7.5   HEMOGLOBIN g/dL 13.7 13.6 13.0*   HEMATOCRIT % 39.6* 37.0* 37.2*   PLATELETS AUTO x10*3/uL 203 208 201   NEUTROS PCT AUTO %  --  79.4 61.0   LYMPHS PCT AUTO %  --  12.1 25.7   MONOS PCT AUTO %  --  7.4 10.5   EOS PCT AUTO %  --  0.3 1.3       Results from last 7 days   Lab Units 02/11/24  1719 02/11/24  1316   APTT seconds 43*  --    INR  2.9* 1.7*       Results from last 7 days   Lab Units 02/12/24  0107 02/11/24  1815 02/11/24  1316   SODIUM mmol/L 141 142 137   POTASSIUM mmol/L 3.6 3.6 3.7   CHLORIDE mmol/L 105 106 101   CO2 mmol/L 27 26 27   BUN mg/dL 10 12 13   CREATININE mg/dL " 0.57 0.69 0.83   CALCIUM mg/dL 8.8 9.5 9.3   PROTEIN TOTAL g/dL  --  6.2* 7.1   BILIRUBIN TOTAL mg/dL  --  0.9 1.0   ALK PHOS U/L  --  81 92   ALT U/L  --  27 31   AST U/L  --  34 29   GLUCOSE mg/dL 101* 86 197*       Results from last 7 days   Lab Units 02/11/24  1815 02/11/24  1316   BILIRUBIN TOTAL mg/dL 0.9 1.0           I have reviewed all medications, laboratory results, and imaging pertinent for today's encounter.

## 2024-02-12 NOTE — PROGRESS NOTES
"Johnnie Rudolph is a 83 y.o. male on day 1 of admission presenting with Intraparenchymal hemorrhage of brain (CMS/HCC).    Subjective   Patient with increasing hemorrhage however stable neurologically. No complaints of headaches, nausea or vision changes.    Objective     Physical Exam  awake  Ox2 (3 w/ choice)  5/5x4  SILT    Last Recorded Vitals  Blood pressure 142/71, pulse 68, temperature 36.7 °C (98.1 °F), resp. rate 21, height 1.854 m (6' 0.99\"), weight 91.5 kg (201 lb 11.5 oz), SpO2 94 %.  Intake/Output last 3 Shifts:  No intake/output data recorded.    Relevant Results      Assessment/Plan   Principal Problem:    Intraparenchymal hemorrhage of brain (CMS/HCC)    83yM h/o HTN, HLD, A-fib (on xarelto), recent ischemic stroke (10/2023, not on ASA), T2DM, cataracts, glaucoma, s/p fall (+HS, +LOC), CTH small LF tSAH, contusion, rCTH worsening contusion, s/p andexxa, rrCTH increased ICH and MLS    PLAN    Trauma primary  Needs repeat CTH for stability this AM  Recommend another CTH 2/13 AM  Please continue to hold Xarelto  Keppra for 7 days for seizure prophylaxis  Please continue to hold DVT prophylaxis  SCDs    Kristin Dunlap MD      "

## 2024-02-12 NOTE — SIGNIFICANT EVENT
Midnight CTH with minimally increased ICH w/ MLS. Patient examined:    Sleeping, eyes opened to voice, sustained  Ox2 (3/ choice)  5/5x4    Recommend another repeat scan at 6 hours for stability. Recommend TEG and correction as needed.

## 2024-02-12 NOTE — PROGRESS NOTES
[facility]  TRAUMA ICU - PROGRESS NOTE    Patient Name: Johnnie Rudolph  MRN: [id number]  Admit Date: 211  : 1940  AGE: 83 y.o.   GENDER: male  =================================================================  MECHANISM OF INJURY:   Fall down steps  LOC (yes/no?): Unknown  Anticoagulant / Anti-platelet Rx? (for what dx?): xarelto, AF and ischemic stroke hx (last dose am )  Referring Facility Name (N/A for scene EMR run): [facility]     INJURIES:   Trace acute SAH, left frontal lobe     OTHER MEDICAL PROBLEMS:  Ischemic stroke (10/23)  HTN  A fib   DM II  Glaucoma     INCIDENTAL FINDINGS:  None     PROCEDURES:    =================================================================    TODAY'S ASSESSMENT AND PLAN OF CARE:  Johnnie Rudolph is a 83 y.o. male in the ICU due to: ICH and Q1 hour neuro checks.     NEURO/PAIN/SEDATION:   ICH:  GCS 14, nsx following  CT head repeat with worsening bleed, given andexa given xarelto usage, last dose  am  Repeat head CT at midnight to ensure stability   Discussed with family, he is on xarelto, is not on eliquis  Q1 hour neuro checks     RESPIRATORY:   No acute issues     CARDIOVASC:   HTN  -resume lisnopril-hydrochlorothiazide when able    AF  -no rate control meds at home, holding home xarelto    GI:   Diet when able     :   No acute issues      FEN:   MIV    HEMATOLOGIC:   Holding home xarelto     ENDOCRINE:   DMII  SSI while inpatient, takes metformin/glipizide at home   BG 87, trend     MUSCULOSKELETAL/SKIN:   No acute issues     INFECTIOUS DISEASE:   Leukocytosis   -reactive from trauma, no infectious concerns     GI PROPHYLAXIS: none  DVT PROPHYLAXIS: SCDs BLE    DISPOSITION: TSICU  ==============================================================================  CHIEF COMPLAINT / OVERNIGHT EVENTS / HPI:   No new issues after admission to TSICU, neuro checks.     MEDICAL HISTORY / ROS:  Admission history and ROS reviewed.  "Pertinent changes as follows:      PHYSICAL EXAM:  Heart Rate:  [65-97]   Temperature:  [35.4 °C (95.7 °F)-36.8 °C (98.3 °F)]   Respirations:  [10-36]   BP: (133-158)/()   Height:  [185.4 cm (6' 1\")]   Weight:  [95.5 kg (210 lb 8.6 oz)]   Pulse Ox:  [91 %-100 %]     Constitutional: Pt is oriented to person, place, and time. Pt appears well-developed and well-nourished.  HENT:  Head: Normocephalic and atraumatic.  Mouth/Trachea: Oropharynx is clear and moist. Trachea midline  No hematomas or lacerations or abrasions to face or scalp  OP clear, no blood, no malocclusion, dentition intact  Nares clear, no nasal septal hematoma, TMs clear, no hemotympanum, Midface stable  Eyes: Conjunctivae and EOM are normal. Pupils are equal, round, and reactive to light.  Neck: C-spine midline nontender, no step-offs  Cardiovascular: Normal rate, regular rhythm and normal heart sounds.  Pulmonary/Chest: Effort normal and breath sounds normal. No respiratory distress. No audible wheezed.   CTA bilaterally.   Abdominal: Soft. Rounded, NTTP, non peritoneal, no guarding. Non distended.   : clear yellow urine  Musculoskeletal: No bony tenderness to extremities, no deformities, full ROM extremities, Chest wall stable, Pelvis stable and non-tender  No vertebral TTP and spine without stepoffs  Neurological: Pt is alert and oriented to person, place, and time. GCS 15  Moving all extremities willfully, following commands in all four extremities, Alert and oriented x 3  Skin: Skin is warm and dry. No abrasions, no lacerations  Psychiatric: Behavior is appropriate for situation  Lines:       IMAGING SUMMARY:  (summary of new imaging findings, not a copy of dictation)  Reviewed     LABS:  Results from last 7 days   Lab Units 02/11/24  1815 02/11/24  1316   WBC AUTO x10*3/uL 12.9* 7.5   HEMOGLOBIN g/dL 13.6 13.0*   HEMATOCRIT % 37.0* 37.2*   PLATELETS AUTO x10*3/uL 208 201   NEUTROS PCT AUTO % 79.4 61.0   LYMPHS PCT AUTO % 12.1 25.7 "   MONOS PCT AUTO % 7.4 10.5   EOS PCT AUTO % 0.3 1.3     Results from last 7 days   Lab Units 02/11/24  1719 02/11/24  1316   APTT seconds 43*  --    INR  2.9* 1.7*     Results from last 7 days   Lab Units 02/11/24  1815 02/11/24  1316   SODIUM mmol/L 142 137   POTASSIUM mmol/L 3.6 3.7   CHLORIDE mmol/L 106 101   CO2 mmol/L 26 27   BUN mg/dL 12 13   CREATININE mg/dL 0.69 0.83   CALCIUM mg/dL 9.5 9.3   PROTEIN TOTAL g/dL 6.2* 7.1   BILIRUBIN TOTAL mg/dL 0.9 1.0   ALK PHOS U/L 81 92   ALT U/L 27 31   AST U/L 34 29   GLUCOSE mg/dL 86 197*     Results from last 7 days   Lab Units 02/11/24  1815 02/11/24  1316   BILIRUBIN TOTAL mg/dL 0.9 1.0         I have reviewed all medications, laboratory results, and imaging pertinent for today's encounter.

## 2024-02-12 NOTE — PROGRESS NOTES
Occupational Therapy    Communication Note    Missed Visit: Yes  Missed Visit Reason:  (Pt discussed with RN. Pt with worsening CTH, OT evaluation deferred at this time.)      02/12/24 at 9:37 AM   Lisette Robbins OT   Rehab Office: [phone]

## 2024-02-13 ENCOUNTER — APPOINTMENT (OUTPATIENT)
Dept: RADIOLOGY | Facility: HOSPITAL | Age: 84
DRG: 082 | End: 2024-02-13
Payer: MEDICARE

## 2024-02-13 ENCOUNTER — APPOINTMENT (OUTPATIENT)
Dept: NEUROLOGY | Facility: HOSPITAL | Age: 84
DRG: 082 | End: 2024-02-13
Payer: MEDICARE

## 2024-02-13 LAB
ALBUMIN SERPL BCP-MCNC: 3 G/DL (ref 3.4–5)
ALBUMIN SERPL BCP-MCNC: 3.4 G/DL (ref 3.4–5)
ANION GAP SERPL CALC-SCNC: 10 MMOL/L (ref 10–20)
ANION GAP SERPL CALC-SCNC: 12 MMOL/L (ref 10–20)
APTT PPP: 33 SECONDS (ref 27–38)
BUN SERPL-MCNC: 12 MG/DL (ref 6–23)
BUN SERPL-MCNC: 13 MG/DL (ref 6–23)
CA-I BLD-SCNC: 1.05 MMOL/L (ref 1.1–1.33)
CALCIUM SERPL-MCNC: 7.4 MG/DL (ref 8.6–10.6)
CALCIUM SERPL-MCNC: 8.1 MG/DL (ref 8.6–10.6)
CHLORIDE SERPL-SCNC: 101 MMOL/L (ref 98–107)
CHLORIDE SERPL-SCNC: 105 MMOL/L (ref 98–107)
CO2 SERPL-SCNC: 24 MMOL/L (ref 21–32)
CO2 SERPL-SCNC: 25 MMOL/L (ref 21–32)
CREAT SERPL-MCNC: 0.61 MG/DL (ref 0.5–1.3)
CREAT SERPL-MCNC: 0.65 MG/DL (ref 0.5–1.3)
EGFRCR SERPLBLD CKD-EPI 2021: >90 ML/MIN/1.73M*2
EGFRCR SERPLBLD CKD-EPI 2021: >90 ML/MIN/1.73M*2
ERYTHROCYTE [DISTWIDTH] IN BLOOD BY AUTOMATED COUNT: 13.2 % (ref 11.5–14.5)
GLUCOSE BLD MANUAL STRIP-MCNC: 125 MG/DL (ref 74–99)
GLUCOSE BLD MANUAL STRIP-MCNC: 133 MG/DL (ref 74–99)
GLUCOSE BLD MANUAL STRIP-MCNC: 137 MG/DL (ref 74–99)
GLUCOSE BLD MANUAL STRIP-MCNC: 146 MG/DL (ref 74–99)
GLUCOSE BLD MANUAL STRIP-MCNC: 154 MG/DL (ref 74–99)
GLUCOSE SERPL-MCNC: 126 MG/DL (ref 74–99)
GLUCOSE SERPL-MCNC: 128 MG/DL (ref 74–99)
HCT VFR BLD AUTO: 36.3 % (ref 41–52)
HGB BLD-MCNC: 12.6 G/DL (ref 13.5–17.5)
INR PPP: 1.3 (ref 0.9–1.1)
MAGNESIUM SERPL-MCNC: 1.72 MG/DL (ref 1.6–2.4)
MCH RBC QN AUTO: 32.5 PG (ref 26–34)
MCHC RBC AUTO-ENTMCNC: 34.7 G/DL (ref 32–36)
MCV RBC AUTO: 94 FL (ref 80–100)
NRBC BLD-RTO: 0 /100 WBCS (ref 0–0)
PHOSPHATE SERPL-MCNC: 2.7 MG/DL (ref 2.5–4.9)
PHOSPHATE SERPL-MCNC: 3.2 MG/DL (ref 2.5–4.9)
PLATELET # BLD AUTO: 177 X10*3/UL (ref 150–450)
POTASSIUM SERPL-SCNC: 3.7 MMOL/L (ref 3.5–5.3)
POTASSIUM SERPL-SCNC: 5.4 MMOL/L (ref 3.5–5.3)
PROTHROMBIN TIME: 14.9 SECONDS (ref 9.8–12.8)
RBC # BLD AUTO: 3.88 X10*6/UL (ref 4.5–5.9)
SODIUM SERPL-SCNC: 133 MMOL/L (ref 136–145)
SODIUM SERPL-SCNC: 135 MMOL/L (ref 136–145)
WBC # BLD AUTO: 11.9 X10*3/UL (ref 4.4–11.3)

## 2024-02-13 PROCEDURE — 2500000004 HC RX 250 GENERAL PHARMACY W/ HCPCS (ALT 636 FOR OP/ED): Performed by: NURSE PRACTITIONER

## 2024-02-13 PROCEDURE — 2500000004 HC RX 250 GENERAL PHARMACY W/ HCPCS (ALT 636 FOR OP/ED): Performed by: STUDENT IN AN ORGANIZED HEALTH CARE EDUCATION/TRAINING PROGRAM

## 2024-02-13 PROCEDURE — 2500000001 HC RX 250 WO HCPCS SELF ADMINISTERED DRUGS (ALT 637 FOR MEDICARE OP)

## 2024-02-13 PROCEDURE — 70450 CT HEAD/BRAIN W/O DYE: CPT | Performed by: RADIOLOGY

## 2024-02-13 PROCEDURE — 83735 ASSAY OF MAGNESIUM: CPT | Performed by: STUDENT IN AN ORGANIZED HEALTH CARE EDUCATION/TRAINING PROGRAM

## 2024-02-13 PROCEDURE — 4A10X4Z MONITORING OF CENTRAL NERVOUS ELECTRICAL ACTIVITY, EXTERNAL APPROACH: ICD-10-PCS | Performed by: SURGERY

## 2024-02-13 PROCEDURE — 85027 COMPLETE CBC AUTOMATED: CPT | Performed by: STUDENT IN AN ORGANIZED HEALTH CARE EDUCATION/TRAINING PROGRAM

## 2024-02-13 PROCEDURE — 82947 ASSAY GLUCOSE BLOOD QUANT: CPT

## 2024-02-13 PROCEDURE — 99291 CRITICAL CARE FIRST HOUR: CPT | Performed by: STUDENT IN AN ORGANIZED HEALTH CARE EDUCATION/TRAINING PROGRAM

## 2024-02-13 PROCEDURE — 36415 COLL VENOUS BLD VENIPUNCTURE: CPT | Performed by: STUDENT IN AN ORGANIZED HEALTH CARE EDUCATION/TRAINING PROGRAM

## 2024-02-13 PROCEDURE — 2020000001 HC ICU ROOM DAILY

## 2024-02-13 PROCEDURE — 82330 ASSAY OF CALCIUM: CPT | Performed by: STUDENT IN AN ORGANIZED HEALTH CARE EDUCATION/TRAINING PROGRAM

## 2024-02-13 PROCEDURE — 70450 CT HEAD/BRAIN W/O DYE: CPT

## 2024-02-13 PROCEDURE — 99232 SBSQ HOSP IP/OBS MODERATE 35: CPT | Performed by: STUDENT IN AN ORGANIZED HEALTH CARE EDUCATION/TRAINING PROGRAM

## 2024-02-13 PROCEDURE — 2500000004 HC RX 250 GENERAL PHARMACY W/ HCPCS (ALT 636 FOR OP/ED)

## 2024-02-13 PROCEDURE — 80069 RENAL FUNCTION PANEL: CPT | Performed by: STUDENT IN AN ORGANIZED HEALTH CARE EDUCATION/TRAINING PROGRAM

## 2024-02-13 PROCEDURE — 2500000005 HC RX 250 GENERAL PHARMACY W/O HCPCS

## 2024-02-13 PROCEDURE — 2500000001 HC RX 250 WO HCPCS SELF ADMINISTERED DRUGS (ALT 637 FOR MEDICARE OP): Performed by: NURSE PRACTITIONER

## 2024-02-13 PROCEDURE — 85610 PROTHROMBIN TIME: CPT | Performed by: STUDENT IN AN ORGANIZED HEALTH CARE EDUCATION/TRAINING PROGRAM

## 2024-02-13 RX ORDER — METOPROLOL TARTRATE 1 MG/ML
5 INJECTION, SOLUTION INTRAVENOUS EVERY 6 HOURS
Status: DISCONTINUED | OUTPATIENT
Start: 2024-02-13 | End: 2024-02-16

## 2024-02-13 RX ORDER — MAGNESIUM SULFATE HEPTAHYDRATE 40 MG/ML
2 INJECTION, SOLUTION INTRAVENOUS ONCE
Status: COMPLETED | OUTPATIENT
Start: 2024-02-13 | End: 2024-02-13

## 2024-02-13 RX ORDER — NYSTATIN 100000 [USP'U]/ML
5 SUSPENSION ORAL 4 TIMES DAILY
Status: DISCONTINUED | OUTPATIENT
Start: 2024-02-13 | End: 2024-02-19

## 2024-02-13 RX ORDER — QUETIAPINE FUMARATE 25 MG/1
25 TABLET, FILM COATED ORAL NIGHTLY
Status: DISCONTINUED | OUTPATIENT
Start: 2024-02-13 | End: 2024-02-15

## 2024-02-13 RX ORDER — LABETALOL HYDROCHLORIDE 5 MG/ML
10 INJECTION, SOLUTION INTRAVENOUS EVERY 4 HOURS PRN
Status: DISCONTINUED | OUTPATIENT
Start: 2024-02-13 | End: 2024-02-20

## 2024-02-13 RX ORDER — CALCIUM GLUCONATE 20 MG/ML
1 INJECTION, SOLUTION INTRAVENOUS ONCE
Status: COMPLETED | OUTPATIENT
Start: 2024-02-13 | End: 2024-02-13

## 2024-02-13 RX ADMIN — METOPROLOL TARTRATE 5 MG: 1 INJECTION, SOLUTION INTRAVENOUS at 20:34

## 2024-02-13 RX ADMIN — HALOPERIDOL LACTATE 5 MG: 5 INJECTION, SOLUTION INTRAMUSCULAR at 06:21

## 2024-02-13 RX ADMIN — LEVETIRACETAM 500 MG: 5 INJECTION INTRAVENOUS at 20:35

## 2024-02-13 RX ADMIN — HALOPERIDOL LACTATE 5 MG: 5 INJECTION, SOLUTION INTRAMUSCULAR at 02:11

## 2024-02-13 RX ADMIN — CALCIUM GLUCONATE 1 G: 20 INJECTION, SOLUTION INTRAVENOUS at 06:21

## 2024-02-13 RX ADMIN — ATORVASTATIN CALCIUM 80 MG: 80 TABLET, FILM COATED ORAL at 20:34

## 2024-02-13 RX ADMIN — LABETALOL HYDROCHLORIDE 10 MG: 5 INJECTION, SOLUTION INTRAVENOUS at 13:30

## 2024-02-13 RX ADMIN — SODIUM CHLORIDE 125 ML/HR: 9 INJECTION, SOLUTION INTRAVENOUS at 21:00

## 2024-02-13 RX ADMIN — LEVETIRACETAM 500 MG: 5 INJECTION INTRAVENOUS at 13:30

## 2024-02-13 RX ADMIN — LABETALOL HYDROCHLORIDE 10 MG: 5 INJECTION, SOLUTION INTRAVENOUS at 20:49

## 2024-02-13 RX ADMIN — NYSTATIN 500000 UNITS: 100000 SUSPENSION ORAL at 20:35

## 2024-02-13 RX ADMIN — MAGNESIUM SULFATE HEPTAHYDRATE 2 G: 40 INJECTION, SOLUTION INTRAVENOUS at 03:44

## 2024-02-13 ASSESSMENT — PAIN - FUNCTIONAL ASSESSMENT
PAIN_FUNCTIONAL_ASSESSMENT: 0-10

## 2024-02-13 ASSESSMENT — PAIN SCALES - GENERAL
PAINLEVEL_OUTOF10: 0 - NO PAIN

## 2024-02-13 NOTE — PROGRESS NOTES
"Johnnie Rudolph is a 83 y.o. male on day 2 of admission presenting with Intraparenchymal hemorrhage of brain (CMS/HCC).    Subjective   NAEON         Objective     Physical Exam  awake  Ox2 (3 w/ choice)  5/5x4  SILT    Last Recorded Vitals  Blood pressure 165/69, pulse 78, temperature 36.7 °C (98.1 °F), temperature source Temporal, resp. rate 20, height 1.854 m (6' 0.99\"), weight 91.5 kg (201 lb 11.5 oz), SpO2 94 %.  Intake/Output last 3 Shifts:  I/O last 3 completed shifts:  In: 2132.1 (23.3 mL/kg) [I.V.:1682.1 (18.4 mL/kg); IV Piggyback:450]  Out: 1150 (12.6 mL/kg) [Urine:1150 (0.3 mL/kg/hr)]  Weight: 91.5 kg     Relevant Results           This patient currently has cardiac telemetry ordered; if you would like to modify or discontinue the telemetry order, click here to go to the orders activity to modify/discontinue the order.                 Assessment/Plan   Principal Problem:    Intraparenchymal hemorrhage of brain (CMS/HCC)    83yM h/o HTN, HLD, A-fib (on xarelto), recent ischemic stroke (10/2023, not on ASA), T2DM, cataracts, glaucoma, s/p fall (+HS, +LOC), CTH small LF tSAH, contusion, rCTH worsening contusion, s/p andexxa, rrCTH increased ICH and MLS     2/12 CTH stable    PLAN     Trauma primary  Follow up on CTH final read this AM  Please continue to hold Xarelto until follow up  Keppra for 7 days for seizure prophylaxis  SCDs, Ok and encourage DVT prophylactic anticoagulation.              Meng Rushing MD      "

## 2024-02-13 NOTE — PROGRESS NOTES
Occupational Therapy    Communication Note    Missed Visit: Yes  Missed Visit Reason:  (Pt with worsening CTH this AM, OT evaluation deferred.)      02/13/24 at 3:50 PM   Lisette Robbins, OT   Rehab Office: [phone]

## 2024-02-13 NOTE — SIGNIFICANT EVENT
Trauma team made aware that patient has been lethargic. CT head reviewed and demonstrated stable findings.     Patient examined and listed as follows:  Eyes opened to voice (not sustained)  Ox1 but says words  BUE follows commands >AG  BLE spontaneous    If patient's exam is still poor consider EEG.

## 2024-02-13 NOTE — PROGRESS NOTES
[facility]  TRAUMA ICU - PROGRESS NOTE    Patient Name: Johnnie Rudolph  MRN: [id number]  Admit Date: 211  : 1940  AGE: 83 y.o.   GENDER: male  =================================================================  MECHANISM OF INJURY:   Fall down steps  LOC (yes/no?): Unknown  Anticoagulant / Anti-platelet Rx? (for what dx?): xarelto, AF and ischemic stroke hx (last dose am )  Referring Facility Name (N/A for scene EMR run): [facility]     INJURIES:   Trace acute SAH, left frontal lobe     OTHER MEDICAL PROBLEMS:  Ischemic stroke (10/23)  HTN  A fib   DM II  Glaucoma     INCIDENTAL FINDINGS:  None     PROCEDURES:    =================================================================    TODAY'S ASSESSMENT AND PLAN OF CARE:  Johnnie Rudolph is a 83 y.o. male in the ICU due to: ICH and Q1 hour neuro checks.     NEURO/PAIN/SEDATION:   - ICH, SAH, SDH  - Baseline confusion  - Seroq Uel 25 mg at bedtime started   - Discontinued Haldol due to sedative effects - can restart if sigificant agitation nonresponsive to redirection  - CT head repeat with worsening bleed, given andexa given xarelto usage, last dose  am  - Repeat CT Head  - pending read from radiology   - Discussed with family, he is on xarelto, is not on eliquis  - Q1 hour neuro checks     RESPIRATORY:   - No acute issues   - Maintain SpO2 > 92%    CARDIOVASC:   - HTN - resume Lisinopril/hydrochlorothiazide when able  - Not taking PO - will use PRN labetalol  - Home atorvastatin dose restarted  - Afib on Xarelto -reversed and holding  - NSGY requesting cardiology input on need for Xarelto  - Continuous telemetry    GI:   - Carb controlled diet ordered - patient not eating   - Dobhoff to be placed today for medications and begin feeding    :   -  ccs  - Replete electrolytes as needed  - NS @ 125 ml/hr    HEMATOLOGIC:   - Holding home xarelto   - Daily CBC     ENDOCRINE:   - DMII  - SSI while inpatient,  takes metformin/glipizide at home   - BG range     MUSCULOSKELETAL/SKIN:   - No acute issues   - PT/OT     INFECTIOUS DISEASE:   - Leukocytosis to 11.9  - reactive from trauma, no infectious concerns     GI PROPHYLAXIS: none  DVT PROPHYLAXIS: SCDs BLE, no chemoppx due to brain bleed - may start DVT chemoPPX on AM of 2/14    DISPOSITION: TSU  ==============================================================================  CHIEF COMPLAINT / OVERNIGHT EVENTS / HPI:   No new issues after admission to TSICU, neuro checks.     MEDICAL HISTORY / ROS:  Admission history and ROS reviewed. Pertinent changes as follows:      PHYSICAL EXAM:  Heart Rate:  []   Temp:  [36.1 °C (97 °F)-37.4 °C (99.3 °F)]   Resp:  [13-37]   BP: (123-168)/()   SpO2:  [90 %-95 %]     General: NAD, difficult to rouse 2/2 to haldol/seroquel  HEENT: Normocephalic, atraumatic  Neuro: A&O x 0, motor and strength intact  Cardio: Rate controlled a fib  Resp: Unlabored breathing on RA  Abdomen: Soft, nontender, nondistended  : External catheter in place draining clear yellow urine  MSK: BAUM, normal ROM. Strength 5/5  Extremities: Normal extremities, no edema or cyanosis  Skin: Warm and dry,  no new lesions or rashes  Psych: Appropriate mood and behavior    IMAGING SUMMARY:  (summary of new imaging findings, not a copy of dictation)  Reviewed     LABS:  Results from last 7 days   Lab Units 02/13/24  0228 02/12/24  0107 02/11/24  1815 02/11/24  1316   WBC AUTO x10*3/uL 11.9* 11.0 12.9* 7.5   HEMOGLOBIN g/dL 12.6* 13.7 13.6 13.0*   HEMATOCRIT % 36.3* 39.6* 37.0* 37.2*   PLATELETS AUTO x10*3/uL 177 203 208 201   NEUTROS PCT AUTO %  --   --  79.4 61.0   LYMPHS PCT AUTO %  --   --  12.1 25.7   MONOS PCT AUTO %  --   --  7.4 10.5   EOS PCT AUTO %  --   --  0.3 1.3       Results from last 7 days   Lab Units 02/13/24  0228 02/11/24  1719 02/11/24  1316   APTT seconds 33 43*  --    INR  1.3* 2.9* 1.7*       Results from last 7 days   Lab Units  02/13/24  0353 02/13/24  0228 02/12/24  0107 02/11/24  1815 02/11/24  1316   SODIUM mmol/L 135* 133* 141 142 137   POTASSIUM mmol/L 3.7 5.4* 3.6 3.6 3.7   CHLORIDE mmol/L 105 101 105 106 101   CO2 mmol/L 24 25 27 26 27   BUN mg/dL 12 13 10 12 13   CREATININE mg/dL 0.61 0.65 0.57 0.69 0.83   CALCIUM mg/dL 7.4* 8.1* 8.8 9.5 9.3   PROTEIN TOTAL g/dL  --   --   --  6.2* 7.1   BILIRUBIN TOTAL mg/dL  --   --   --  0.9 1.0   ALK PHOS U/L  --   --   --  81 92   ALT U/L  --   --   --  27 31   AST U/L  --   --   --  34 29   GLUCOSE mg/dL 126* 128* 101* 86 197*       Results from last 7 days   Lab Units 02/11/24  1815 02/11/24  1316   BILIRUBIN TOTAL mg/dL 0.9 1.0           I have reviewed all medications, laboratory results, and imaging pertinent for today's encounter.

## 2024-02-13 NOTE — CONSULTS
Reason For Consult  Initiating xarelto for A fib in the setting of SAH    History Of Present Illness  Johnnie Rudolph is a 83 y.o. male with history of Ischemic stroke, HTN, DM II, glucoma and A-fib on Xarelto who presented as a transfer from outside ED for trace subarachnoid hemorrhage in the setting of recent fall on anticoagulation. Cardiology was consulted as xarelto was stopped on 2/11 due to worse SAH.     Today, repeat CT head at 6AM showed: Left anterior frontal lobe intraparenchymal hemorrhage is slightly increased in size when compared to prior, with stable to slightly increased rightward midline shift. Stable left subfrontal subdural hematoma.      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    Surgical History  He has a past surgical history that includes Knee surgery (06/02/2014); Hernia repair (06/02/2014); Other surgical history (03/25/2022); MR angio head wo IV contrast (10/13/2023); and MR angio neck wo IV contrast (10/13/2023).     Social History  He reports that he has never smoked. He has never used smokeless tobacco. He reports that he does not drink alcohol and does not use drugs.    Family History  Family  "History   Problem Relation Name Age of Onset    Skin cancer Father      Hypertension Son      Diabetes Other      Prostate cancer Other          Allergies  Patient has no known allergies.    Review of Systems  Review of Systems   Unable to perform ROS: Mental status change         Physical Exam  Gen: asleep  CV: RRR. No murmurs, gallops, rubs  Pulm: clear to auscultation bilaterally. No coarse lung sounds  Abd: soft, non-distended. Normal active bowel sounds  Ext: warm and well-perfused. No LE edema    Echo on 10/13/23:   CONCLUSIONS:   1. Left ventricular systolic function is normal with a 55-60% estimated ejection fraction.   2. Spectral Doppler shows an abnormal pattern of left ventricular diastolic filling.   3. Mild tricuspid regurgitation visualized.   4. Mildly elevated RVSP.       Last Recorded Vitals  Blood pressure 151/85, pulse 68, temperature 37 °C (98.6 °F), temperature source Temporal, resp. rate 20, height 1.854 m (6' 0.99\"), weight 91.5 kg (201 lb 11.5 oz), SpO2 94 %.    Relevant Results  Lab Results   Component Value Date    WBC 11.9 (H) 02/13/2024    HGB 12.6 (L) 02/13/2024    HCT 36.3 (L) 02/13/2024    MCV 94 02/13/2024     02/13/2024      Lab Results   Component Value Date    GLUCOSE 126 (H) 02/13/2024    CALCIUM 7.4 (L) 02/13/2024     (L) 02/13/2024    K 3.7 02/13/2024    CO2 24 02/13/2024     02/13/2024    BUN 12 02/13/2024    CREATININE 0.61 02/13/2024      Lab Results   Component Value Date    HGBA1C 7.1 (H) 10/13/2023             Assessment/Plan   Johnnie Rudolph is a 83 y.o. male with history of mild dementia, Ischemic stroke, HTN, DM II, glucoma and A-fib on Xarelto who presented as a transfer from outside ED for trace subarachnoid hemorrhage in the setting of recent fall on anticoagulation. Cardiology was consulted as xarelto was stopped on 2/11 due to worse SAH.     #Atrial fibrillation: was on Xarelto (last dose on 2/11). Pt is rate controlled.   #hx of ischemic " stroke  #HTN    Recommendations:  - Despite having elevated [id number] score: 6 and hx of ischemic stroke, will recommend holding Xarelto given acute intracranial hemorrhage.   - Defer timing to resume anticoagulation (if at all) to primary team and neurosurgery.   - After pt's recovery and discharge, consider outpatient referral for percutaneous left atrial appendage closure ([facility]).   - Keep on Tele  - Keep K>4 and Mg>2  - Cardiology will sign off, please reach out if you have any questions.     Thank you for this consultation. General cardiology consult pager: [phone] (weekday 7AM-6PM, weekend 7AM-2PM, other times: [phone])       Adrián Peterson, MS3

## 2024-02-13 NOTE — PROGRESS NOTES
Physical Therapy                 Therapy Communication Note    Patient Name: Johnnie Rudolph  MRN: [id number]  Today's Date: 2/13/2024     Discipline: Physical Therapy    Missed Visit Reason: Missed Visit Reason: Patient placed on medical hold (Pt with worsening head CT this AM and change in mental status. Will hold until pt medically stable.)    Missed Time: Attempt

## 2024-02-13 NOTE — PROGRESS NOTES
[facility]  TRAUMA - PROGRESS NOTE    Patient Name: Johnnie Rudolph  MRN: [id number]  Admit Date: 211  : 1940  AGE: 83 y.o.   GENDER: male  =================================================================  MECHANISM OF INJURY:   Fall down steps  LOC (yes/no?): Unknown  Anticoagulant / Anti-platelet Rx? (for what dx?): xarelto, AF and ischemic stroke hx (last dose am )  Referring Facility Name (N/A for scene EMR run): [facility]     INJURIES:   Trace acute SAH, left frontal lobe     OTHER MEDICAL PROBLEMS:  Ischemic stroke (10/23)  HTN  A fib   DM II  Glaucoma     INCIDENTAL FINDINGS:  None     PROCEDURES:    =================================================================    TODAY'S ASSESSMENT AND PLAN OF CARE:  Johnnie Rudolph is a 83 y.o. male in the ICU due to: ICH and Q1 hour neuro checks. CT head repeat with worsening bleed, given andexa given xarelto usage, last dose  am.    -NSGY following - appreciate recs  - Repeat Head CT  in AM - follow up official read  - Continue to hold xarelto  -Continue q1hr neuro checks  -Continue ICU level of care     Patient seen and discussed with Attending, Dr. Buenrostro.    Cade Stoll MD  General Surgery Resident  Trauma Surgery    ==============================================================================  CHIEF COMPLAINT / OVERNIGHT EVENTS / HPI:   NAEO    MEDICAL HISTORY / ROS:  Admission history and ROS reviewed. Pertinent changes as follows:      PHYSICAL EXAM:  Heart Rate:  []   Temp:  [36.1 °C (97 °F)-37.4 °C (99.3 °F)]   Resp:  [13-37]   BP: (123-168)/()   SpO2:  [90 %-95 %]     General: NAD, awake and alert, conversive  HEENT: Normocephalic, atraumatic  Neuro: GCS 10 E2V2M6  Cardio: Bradycardic episodes, asymptomatic  Resp: Unlabored breathing on RA  Abdomen: Soft, nontender, nondistended  : External catheter in place draining clear yellow urine  MSK: BAUM, normal ROM. Strength 5/5  Extremities:  Normal extremities, no edema or cyanosis  Skin: Warm and dry,  no new lesions or rashes  Psych: Appropriate mood and behavior    IMAGING SUMMARY:  (summary of new imaging findings, not a copy of dictation)  Reviewed     LABS:  Results from last 7 days   Lab Units 02/13/24 0228 02/12/24 0107 02/11/24 1815 02/11/24  1316   WBC AUTO x10*3/uL 11.9* 11.0 12.9* 7.5   HEMOGLOBIN g/dL 12.6* 13.7 13.6 13.0*   HEMATOCRIT % 36.3* 39.6* 37.0* 37.2*   PLATELETS AUTO x10*3/uL 177 203 208 201   NEUTROS PCT AUTO %  --   --  79.4 61.0   LYMPHS PCT AUTO %  --   --  12.1 25.7   MONOS PCT AUTO %  --   --  7.4 10.5   EOS PCT AUTO %  --   --  0.3 1.3       Results from last 7 days   Lab Units 02/13/24 0228 02/11/24  1719 02/11/24  1316   APTT seconds 33 43*  --    INR  1.3* 2.9* 1.7*       Results from last 7 days   Lab Units 02/13/24  0353 02/13/24 0228 02/12/24 0107 02/11/24 1815 02/11/24  1316   SODIUM mmol/L 135* 133* 141 142 137   POTASSIUM mmol/L 3.7 5.4* 3.6 3.6 3.7   CHLORIDE mmol/L 105 101 105 106 101   CO2 mmol/L 24 25 27 26 27   BUN mg/dL 12 13 10 12 13   CREATININE mg/dL 0.61 0.65 0.57 0.69 0.83   CALCIUM mg/dL 7.4* 8.1* 8.8 9.5 9.3   PROTEIN TOTAL g/dL  --   --   --  6.2* 7.1   BILIRUBIN TOTAL mg/dL  --   --   --  0.9 1.0   ALK PHOS U/L  --   --   --  81 92   ALT U/L  --   --   --  27 31   AST U/L  --   --   --  34 29   GLUCOSE mg/dL 126* 128* 101* 86 197*       Results from last 7 days   Lab Units 02/11/24 1815 02/11/24  1316   BILIRUBIN TOTAL mg/dL 0.9 1.0           I have reviewed all medications, laboratory results, and imaging pertinent for today's encounter.

## 2024-02-14 ENCOUNTER — APPOINTMENT (OUTPATIENT)
Dept: RADIOLOGY | Facility: HOSPITAL | Age: 84
DRG: 082 | End: 2024-02-14
Payer: MEDICARE

## 2024-02-14 DIAGNOSIS — S06.36AD TRAUMATIC INTRACEREBRAL HEMORRHAGE WITH UNKNOWN LOSS OF CONSCIOUSNESS STATUS, UNSPECIFIED LATERALITY, SUBSEQUENT ENCOUNTER: ICD-10-CM

## 2024-02-14 LAB
ALBUMIN SERPL BCP-MCNC: 2.6 G/DL (ref 3.4–5)
ANION GAP SERPL CALC-SCNC: 10 MMOL/L (ref 10–20)
APTT PPP: 28 SECONDS (ref 27–38)
BUN SERPL-MCNC: 12 MG/DL (ref 6–23)
CA-I BLD-SCNC: 1.03 MMOL/L (ref 1.1–1.33)
CALCIUM SERPL-MCNC: 6.7 MG/DL (ref 8.6–10.6)
CHLORIDE SERPL-SCNC: 106 MMOL/L (ref 98–107)
CO2 SERPL-SCNC: 21 MMOL/L (ref 21–32)
CREAT SERPL-MCNC: 0.51 MG/DL (ref 0.5–1.3)
EGFRCR SERPLBLD CKD-EPI 2021: >90 ML/MIN/1.73M*2
ERYTHROCYTE [DISTWIDTH] IN BLOOD BY AUTOMATED COUNT: 12.4 % (ref 11.5–14.5)
GLUCOSE BLD MANUAL STRIP-MCNC: 119 MG/DL (ref 74–99)
GLUCOSE BLD MANUAL STRIP-MCNC: 157 MG/DL (ref 74–99)
GLUCOSE BLD MANUAL STRIP-MCNC: 161 MG/DL (ref 74–99)
GLUCOSE BLD MANUAL STRIP-MCNC: 168 MG/DL (ref 74–99)
GLUCOSE SERPL-MCNC: 116 MG/DL (ref 74–99)
HCT VFR BLD AUTO: 34.3 % (ref 41–52)
HGB BLD-MCNC: 12 G/DL (ref 13.5–17.5)
INR PPP: 1.4 (ref 0.9–1.1)
MAGNESIUM SERPL-MCNC: 1.35 MG/DL (ref 1.6–2.4)
MCH RBC QN AUTO: 32.7 PG (ref 26–34)
MCHC RBC AUTO-ENTMCNC: 35 G/DL (ref 32–36)
MCV RBC AUTO: 94 FL (ref 80–100)
NRBC BLD-RTO: 0 /100 WBCS (ref 0–0)
PHOSPHATE SERPL-MCNC: 2.3 MG/DL (ref 2.5–4.9)
PLATELET # BLD AUTO: 153 X10*3/UL (ref 150–450)
POTASSIUM SERPL-SCNC: 3.1 MMOL/L (ref 3.5–5.3)
PROTHROMBIN TIME: 16.2 SECONDS (ref 9.8–12.8)
RBC # BLD AUTO: 3.67 X10*6/UL (ref 4.5–5.9)
SODIUM SERPL-SCNC: 134 MMOL/L (ref 136–145)
WBC # BLD AUTO: 11.9 X10*3/UL (ref 4.4–11.3)

## 2024-02-14 PROCEDURE — 2500000001 HC RX 250 WO HCPCS SELF ADMINISTERED DRUGS (ALT 637 FOR MEDICARE OP)

## 2024-02-14 PROCEDURE — C9113 INJ PANTOPRAZOLE SODIUM, VIA: HCPCS

## 2024-02-14 PROCEDURE — 71045 X-RAY EXAM CHEST 1 VIEW: CPT | Performed by: RADIOLOGY

## 2024-02-14 PROCEDURE — 95715 VEEG EA 12-26HR INTMT MNTR: CPT

## 2024-02-14 PROCEDURE — 2500000004 HC RX 250 GENERAL PHARMACY W/ HCPCS (ALT 636 FOR OP/ED)

## 2024-02-14 PROCEDURE — 2500000002 HC RX 250 W HCPCS SELF ADMINISTERED DRUGS (ALT 637 FOR MEDICARE OP, ALT 636 FOR OP/ED): Performed by: NURSE PRACTITIONER

## 2024-02-14 PROCEDURE — 80069 RENAL FUNCTION PANEL: CPT | Performed by: STUDENT IN AN ORGANIZED HEALTH CARE EDUCATION/TRAINING PROGRAM

## 2024-02-14 PROCEDURE — 2500000005 HC RX 250 GENERAL PHARMACY W/O HCPCS

## 2024-02-14 PROCEDURE — 95700 EEG CONT REC W/VID EEG TECH: CPT

## 2024-02-14 PROCEDURE — 2020000001 HC ICU ROOM DAILY

## 2024-02-14 PROCEDURE — 82947 ASSAY GLUCOSE BLOOD QUANT: CPT

## 2024-02-14 PROCEDURE — 85610 PROTHROMBIN TIME: CPT | Performed by: STUDENT IN AN ORGANIZED HEALTH CARE EDUCATION/TRAINING PROGRAM

## 2024-02-14 PROCEDURE — 2500000004 HC RX 250 GENERAL PHARMACY W/ HCPCS (ALT 636 FOR OP/ED): Performed by: PHYSICIAN ASSISTANT

## 2024-02-14 PROCEDURE — 36415 COLL VENOUS BLD VENIPUNCTURE: CPT | Performed by: STUDENT IN AN ORGANIZED HEALTH CARE EDUCATION/TRAINING PROGRAM

## 2024-02-14 PROCEDURE — 99497 ADVNCD CARE PLAN 30 MIN: CPT | Performed by: STUDENT IN AN ORGANIZED HEALTH CARE EDUCATION/TRAINING PROGRAM

## 2024-02-14 PROCEDURE — 2500000005 HC RX 250 GENERAL PHARMACY W/O HCPCS: Performed by: NURSE PRACTITIONER

## 2024-02-14 PROCEDURE — 82330 ASSAY OF CALCIUM: CPT | Performed by: STUDENT IN AN ORGANIZED HEALTH CARE EDUCATION/TRAINING PROGRAM

## 2024-02-14 PROCEDURE — 87632 RESP VIRUS 6-11 TARGETS: CPT

## 2024-02-14 PROCEDURE — 99222 1ST HOSP IP/OBS MODERATE 55: CPT | Performed by: STUDENT IN AN ORGANIZED HEALTH CARE EDUCATION/TRAINING PROGRAM

## 2024-02-14 PROCEDURE — 74018 RADEX ABDOMEN 1 VIEW: CPT | Performed by: RADIOLOGY

## 2024-02-14 PROCEDURE — 95720 EEG PHY/QHP EA INCR W/VEEG: CPT | Performed by: STUDENT IN AN ORGANIZED HEALTH CARE EDUCATION/TRAINING PROGRAM

## 2024-02-14 PROCEDURE — 2500000004 HC RX 250 GENERAL PHARMACY W/ HCPCS (ALT 636 FOR OP/ED): Performed by: NURSE PRACTITIONER

## 2024-02-14 PROCEDURE — 74018 RADEX ABDOMEN 1 VIEW: CPT

## 2024-02-14 PROCEDURE — 97112 NEUROMUSCULAR REEDUCATION: CPT | Mod: GO

## 2024-02-14 PROCEDURE — 99291 CRITICAL CARE FIRST HOUR: CPT | Performed by: STUDENT IN AN ORGANIZED HEALTH CARE EDUCATION/TRAINING PROGRAM

## 2024-02-14 PROCEDURE — 85027 COMPLETE CBC AUTOMATED: CPT | Performed by: STUDENT IN AN ORGANIZED HEALTH CARE EDUCATION/TRAINING PROGRAM

## 2024-02-14 PROCEDURE — 83735 ASSAY OF MAGNESIUM: CPT | Performed by: STUDENT IN AN ORGANIZED HEALTH CARE EDUCATION/TRAINING PROGRAM

## 2024-02-14 PROCEDURE — 71045 X-RAY EXAM CHEST 1 VIEW: CPT

## 2024-02-14 PROCEDURE — 2500000001 HC RX 250 WO HCPCS SELF ADMINISTERED DRUGS (ALT 637 FOR MEDICARE OP): Performed by: NURSE PRACTITIONER

## 2024-02-14 PROCEDURE — 97166 OT EVAL MOD COMPLEX 45 MIN: CPT | Mod: GO

## 2024-02-14 RX ORDER — POTASSIUM CHLORIDE 14.9 MG/ML
20 INJECTION INTRAVENOUS
Status: COMPLETED | OUTPATIENT
Start: 2024-02-14 | End: 2024-02-14

## 2024-02-14 RX ORDER — MAGNESIUM SULFATE HEPTAHYDRATE 40 MG/ML
2 INJECTION, SOLUTION INTRAVENOUS ONCE
Status: COMPLETED | OUTPATIENT
Start: 2024-02-14 | End: 2024-02-14

## 2024-02-14 RX ORDER — PANTOPRAZOLE SODIUM 40 MG/10ML
40 INJECTION, POWDER, LYOPHILIZED, FOR SOLUTION INTRAVENOUS DAILY
Status: DISCONTINUED | OUTPATIENT
Start: 2024-02-14 | End: 2024-02-20

## 2024-02-14 RX ORDER — ENOXAPARIN SODIUM 100 MG/ML
30 INJECTION SUBCUTANEOUS EVERY 12 HOURS SCHEDULED
Status: DISCONTINUED | OUTPATIENT
Start: 2024-02-14 | End: 2024-02-16

## 2024-02-14 RX ADMIN — POTASSIUM CHLORIDE 20 MEQ: 14.9 INJECTION, SOLUTION INTRAVENOUS at 06:02

## 2024-02-14 RX ADMIN — METOPROLOL TARTRATE 5 MG: 1 INJECTION, SOLUTION INTRAVENOUS at 15:39

## 2024-02-14 RX ADMIN — MAGNESIUM SULFATE HEPTAHYDRATE 2 G: 40 INJECTION, SOLUTION INTRAVENOUS at 03:48

## 2024-02-14 RX ADMIN — ENOXAPARIN SODIUM 30 MG: 100 INJECTION SUBCUTANEOUS at 13:06

## 2024-02-14 RX ADMIN — METOPROLOL TARTRATE 5 MG: 1 INJECTION, SOLUTION INTRAVENOUS at 09:11

## 2024-02-14 RX ADMIN — QUETIAPINE FUMARATE 25 MG: 25 TABLET ORAL at 21:01

## 2024-02-14 RX ADMIN — LEVETIRACETAM 500 MG: 5 INJECTION INTRAVENOUS at 21:01

## 2024-02-14 RX ADMIN — POTASSIUM CHLORIDE 20 MEQ: 14.9 INJECTION, SOLUTION INTRAVENOUS at 03:48

## 2024-02-14 RX ADMIN — NYSTATIN 500000 UNITS: 100000 SUSPENSION ORAL at 13:06

## 2024-02-14 RX ADMIN — NYSTATIN 500000 UNITS: 100000 SUSPENSION ORAL at 06:02

## 2024-02-14 RX ADMIN — LEVETIRACETAM 500 MG: 5 INJECTION INTRAVENOUS at 09:00

## 2024-02-14 RX ADMIN — METOPROLOL TARTRATE 5 MG: 1 INJECTION, SOLUTION INTRAVENOUS at 01:46

## 2024-02-14 RX ADMIN — NYSTATIN 500000 UNITS: 100000 SUSPENSION ORAL at 17:11

## 2024-02-14 RX ADMIN — INSULIN LISPRO 1 UNITS: 100 INJECTION, SOLUTION INTRAVENOUS; SUBCUTANEOUS at 20:58

## 2024-02-14 RX ADMIN — POTASSIUM PHOSPHATE, MONOBASIC POTASSIUM PHOSPHATE, DIBASIC 15 MMOL: 224; 236 INJECTION, SOLUTION, CONCENTRATE INTRAVENOUS at 08:54

## 2024-02-14 RX ADMIN — ENOXAPARIN SODIUM 30 MG: 100 INJECTION SUBCUTANEOUS at 21:01

## 2024-02-14 RX ADMIN — ATORVASTATIN CALCIUM 80 MG: 80 TABLET, FILM COATED ORAL at 21:01

## 2024-02-14 RX ADMIN — NYSTATIN 500000 UNITS: 100000 SUSPENSION ORAL at 21:01

## 2024-02-14 RX ADMIN — METOPROLOL TARTRATE 5 MG: 1 INJECTION, SOLUTION INTRAVENOUS at 21:01

## 2024-02-14 RX ADMIN — PANTOPRAZOLE SODIUM 40 MG: 40 INJECTION, POWDER, FOR SOLUTION INTRAVENOUS at 17:11

## 2024-02-14 ASSESSMENT — PAIN SCALES - GENERAL
PAINLEVEL_OUTOF10: 0 - NO PAIN

## 2024-02-14 ASSESSMENT — ACTIVITIES OF DAILY LIVING (ADL)
ADL_ASSISTANCE: INDEPENDENT
BATHING_ASSISTANCE: TOTAL

## 2024-02-14 ASSESSMENT — COGNITIVE AND FUNCTIONAL STATUS - GENERAL
MOBILITY SCORE: 14
DRESSING REGULAR LOWER BODY CLOTHING: TOTAL
HELP NEEDED FOR BATHING: TOTAL
PERSONAL GROOMING: TOTAL
DRESSING REGULAR UPPER BODY CLOTHING: TOTAL
TOILETING: TOTAL
STANDING UP FROM CHAIR USING ARMS: A LOT
EATING MEALS: TOTAL
DRESSING REGULAR LOWER BODY CLOTHING: TOTAL
HELP NEEDED FOR BATHING: TOTAL
DAILY ACTIVITIY SCORE: 6
MOVING TO AND FROM BED TO CHAIR: A LITTLE
MOVING FROM LYING ON BACK TO SITTING ON SIDE OF FLAT BED WITH BEDRAILS: A LITTLE
EATING MEALS: TOTAL
TOILETING: TOTAL
DRESSING REGULAR UPPER BODY CLOTHING: TOTAL
CLIMB 3 TO 5 STEPS WITH RAILING: TOTAL
PERSONAL GROOMING: TOTAL
WALKING IN HOSPITAL ROOM: A LOT
DAILY ACTIVITIY SCORE: 6
TURNING FROM BACK TO SIDE WHILE IN FLAT BAD: A LITTLE

## 2024-02-14 ASSESSMENT — PAIN - FUNCTIONAL ASSESSMENT
PAIN_FUNCTIONAL_ASSESSMENT: 0-10

## 2024-02-14 NOTE — PROGRESS NOTES
[facility]  TRAUMA ICU - PROGRESS NOTE    Patient Name: Johnnie Rudolph  MRN: [id number]  Admit Date: 211  : 1940  AGE: 83 y.o.   GENDER: male  =================================================================  MECHANISM OF INJURY:   Fall down steps  LOC (yes/no?): Unknown  Anticoagulant / Anti-platelet Rx? (for what dx?): xarelto, AF and ischemic stroke hx (last dose am )  Referring Facility Name (N/A for scene EMR run): [facility]     INJURIES:   Trace acute SAH, left frontal lobe     OTHER MEDICAL PROBLEMS:  Ischemic stroke (10/23)  HTN  A fib   DM II  Glaucoma     INCIDENTAL FINDINGS:  None     PROCEDURES:    =================================================================    TODAY'S ASSESSMENT AND PLAN OF CARE:  Johnnie Rudolph is a 83 y.o. male in the ICU due to: ICH and Q1 hour neuro checks.     NEURO/PAIN/SEDATION:   - ICH, SAH, SDH  - Baseline confusion  - Seroq Uel 25 mg at bedtime started   - Discontinued Haldol due to sedative effects  - CT head repeat with worsening bleed, given andexa given xarelto usage, last dose  am  - Repeat CT Head  showed worsening bleed, now stable  - EEG completed  - Discussed with family, he is on xarelto, is not on eliquis  - Q1 hour neuro checks     RESPIRATORY:   - No acute issues   - Maintain SpO2 > 92%    CARDIOVASC:   - HTN - resume Lisinopril/hydrochlorothiazide when able  - PRN labetalol for SBPs < 160  - Home atorvastatin dose restarted  - Afib on Xarelto -reversed and holding  - Cardiology to follow outpatient for long-term Xarelto planning  - Continuous telemetry    GI:   - Dobhoff placed  - Start Pivot Tube feeds - ramp up to goal  - Nutrition consult for recs     :   - UOP 1250 ccs  - Replete electrolytes as needed  - NS @ 75 ml/hr    HEMATOLOGIC:   - Holding home xarelto   - Daily CBC     ENDOCRINE:   - DMII  - SSI while inpatient, takes metformin/glipizide at home   - BG range      MUSCULOSKELETAL/SKIN:   - No acute issues   - PT/OT     INFECTIOUS DISEASE:   - Leukocytosis to 11.9  - reactive from trauma, no infectious concerns     GI PROPHYLAXIS: Protonix 40 mg IV     DVT PROPHYLAXIS: SCDs BLE, start Lovenox 30 mg BID on 2/14    DISPOSITION: TSICU    Patient seen and discussed with attending, Dr. Fuentes   ==============================================================================  CHIEF COMPLAINT / OVERNIGHT EVENTS / HPI:   EEG completed, no agitation overnight. Continues to decline to eat. Code status updated to DNR CCA after family discussion. Girlfriend is POA and paperwork in paper chart.     MEDICAL HISTORY / ROS:  Admission history and ROS reviewed. Pertinent changes as follows:      PHYSICAL EXAM:  Heart Rate:  [67-95]   Temp:  [36.1 °C (97 °F)-37.6 °C (99.6 °F)]   Resp:  [14-25]   BP: (115-179)/()   SpO2:  [91 %-96 %]     General: NAD, awake, more responsive  HEENT: Normocephalic, atraumatic, EEG leads in place   Neuro: A&O x 1, motor and strength intact  Cardio: Rate controlled a fib  Resp: Unlabored breathing on RA  Abdomen: Soft, nontender, nondistended  : External catheter in place draining clear yellow urine  MSK: BAUM, normal ROM. Strength 5/5  Extremities: Normal extremities, no edema or cyanosis  Skin: Warm and dry,  no new lesions or rashes  Psych: Appropriate mood and behavior    IMAGING SUMMARY:  (summary of new imaging findings, not a copy of dictation)  Reviewed     LABS:  Results from last 7 days   Lab Units 02/14/24  0229 02/13/24  0228 02/12/24  0107 02/11/24  1815 02/11/24  1316   WBC AUTO x10*3/uL 11.9* 11.9* 11.0 12.9* 7.5   HEMOGLOBIN g/dL 12.0* 12.6* 13.7 13.6 13.0*   HEMATOCRIT % 34.3* 36.3* 39.6* 37.0* 37.2*   PLATELETS AUTO x10*3/uL 153 177 203 208 201   NEUTROS PCT AUTO %  --   --   --  79.4 61.0   LYMPHS PCT AUTO %  --   --   --  12.1 25.7   MONOS PCT AUTO %  --   --   --  7.4 10.5   EOS PCT AUTO %  --   --   --  0.3 1.3       Results from  last 7 days   Lab Units 02/14/24 0229 02/13/24 0228 02/11/24  1719   APTT seconds 28 33 43*   INR  1.4* 1.3* 2.9*       Results from last 7 days   Lab Units 02/14/24  0229 02/13/24  0353 02/13/24 0228 02/12/24  0107 02/11/24  1815 02/11/24  1316   SODIUM mmol/L 134* 135* 133*   < > 142 137   POTASSIUM mmol/L 3.1* 3.7 5.4*   < > 3.6 3.7   CHLORIDE mmol/L 106 105 101   < > 106 101   CO2 mmol/L 21 24 25   < > 26 27   BUN mg/dL 12 12 13   < > 12 13   CREATININE mg/dL 0.51 0.61 0.65   < > 0.69 0.83   CALCIUM mg/dL 6.7* 7.4* 8.1*   < > 9.5 9.3   PROTEIN TOTAL g/dL  --   --   --   --  6.2* 7.1   BILIRUBIN TOTAL mg/dL  --   --   --   --  0.9 1.0   ALK PHOS U/L  --   --   --   --  81 92   ALT U/L  --   --   --   --  27 31   AST U/L  --   --   --   --  34 29   GLUCOSE mg/dL 116* 126* 128*   < > 86 197*    < > = values in this interval not displayed.       Results from last 7 days   Lab Units 02/11/24  1815 02/11/24  1316   BILIRUBIN TOTAL mg/dL 0.9 1.0           I have reviewed all medications, laboratory results, and imaging pertinent for today's encounter.

## 2024-02-14 NOTE — PROGRESS NOTES
"Johnnie Rudolph is a 83 y.o. male on day 3 of admission presenting with Intraparenchymal hemorrhage of brain (CMS/HCC).    Subjective   Stable overnight         Objective     Physical Exam  Eyes opened to voice (not sustained)  Ox1 but says words  BUE follows commands >AG  BLE follows commands    Last Recorded Vitals  Blood pressure 154/56, pulse 82, temperature 36.5 °C (97.7 °F), temperature source Temporal, resp. rate 18, height 1.854 m (6' 0.99\"), weight 91.5 kg (201 lb 11.5 oz), SpO2 94 %.  Intake/Output last 3 Shifts:  I/O last 3 completed shifts:  In: 3032.1 (33.1 mL/kg) [I.V.:2582.1 (28.2 mL/kg); IV Piggyback:450]  Out: 1100 (12 mL/kg) [Urine:1100 (0.3 mL/kg/hr)]  Weight: 91.5 kg     Relevant Results           This patient currently has cardiac telemetry ordered; if you would like to modify or discontinue the telemetry order, click here to go to the orders activity to modify/discontinue the order.                 Assessment/Plan   Principal Problem:    Intraparenchymal hemorrhage of brain (CMS/HCC)    83yM h/o HTN, HLD, A-fib (on xarelto), recent ischemic stroke (10/2023, not on ASA), T2DM, cataracts, glaucoma, s/p fall (+HS, +LOC), CTH small LF tSAH, contusion, rCTH worsening contusion, s/p andexxa, rrCTH increased ICH and MLS     2/12 CTH stable  2/13 exam change, CTH stable, EEG prelim neg    PLAN     Trauma primary  EEG - if positive contact neurology for further recommendations  Please continue to hold Xarelto until 2 week follow up, will have rCTH at that time  Keppra for 7 days for seizure prophylaxis  SCDs, Ok and encourage DVT prophylactic anticoagulation.   Imaging is stable, recommend exploring seizures or metabolic causes of encephalopathy, no acute neurosurgical intervention, neurosurgery will sign off at this time and arrange for follow up as above, please page [phone] with any questions or concerns           Norm Merrill MD      "

## 2024-02-14 NOTE — SIGNIFICANT EVENT
"Preliminary EEG :   This prelim EEG read showed left hemispheric structural lesion and  mild diffuse encephalopathy. No epileptiform discharges or seizures recorded.   This report is until 2246    Final report will be given tomorrow.Please call with questions. To discontinue EEG there is an order in epic\"Discontinue vEEG\"    Thank you       Uday Mills MD   Epilepsy Fellow     "

## 2024-02-14 NOTE — DOCUMENTATION CLARIFICATION NOTE
"    PATIENT:               BIJAN SINGH  ACCT #:                  [id number]  MRN:                       [id number]  :                       1940  ADMIT DATE:       2024 4:01 PM  DISCH DATE:  RESPONDING PROVIDER #:        [id number]          PROVIDER RESPONSE TEXT:    Cerebral Edema    CDI QUERY TEXT:    [facility]_Cerebral Edema        Instruction:    Based on your assessment of the patient and the clinical information, please provide the requested documentation by clicking on the appropriate radio button and enter any additional information if prompted.    Question: Please further clarify if there is a diagnosis related to the clinical information    When answering this query, please exercise your independent professional judgment. The fact that a question is being asked, does not imply that any particular answer is desired or expected.    The patient's clinical indicators include:  Clinical Information: 2024 H&P:\"82 yo M?who fell down 3 steps and bumped the back of his head.?Unsure if he lost consciousness or not. Patient currently takes Eliquis for ischemic stroke in 10/23. Patient has history of falls.acute subarachnoid hemorrhage involving L frontal lobe\"    Clinical Indicators: 2024 CTH\"There is increased surrounding hypodensity, likely  representing worsening vasogenic edema. There is associated sulcal  effacement and new midline shift of approximately 0.3 cm. \"    Treatment: ICU amdit, q1 neuro checks, serial CTH    Risk Factors: s/p fall, Eliquis  Options provided:  -- Cerebral Edema  -- Radiological findings are clinically insignificant  -- Other - I will add my own diagnosis  -- Refer to Clinical Documentation Reviewer    Query created by: Lissett Wan on 2024 9:47 AM      Electronically signed by:  SHOAIB KWOK MD 2024 11:06 AM          "

## 2024-02-14 NOTE — DOCUMENTATION CLARIFICATION NOTE
"    PATIENT:               BIJAN SINGH  ACCT #:                  [id number]  MRN:                       [id number]  :                       1940  ADMIT DATE:       2024 4:01 PM  DISCH DATE:  RESPONDING PROVIDER #:        [id number]          PROVIDER RESPONSE TEXT:    Brain compression traumatic    CDI QUERY TEXT:    [facility]_Brain Compression        Instruction:    Based on your assessment of the patient and the clinical information, please provide the requested documentation by clicking on the appropriate radio button and enter any additional information if prompted.    Question: Please further clarify if there is a diagnosis related to the clinical information    When answering this query, please exercise your independent professional judgment. The fact that a question is being asked, does not imply that any particular answer is desired or expected.    The patient's clinical indicators include:  Clinical Information: 2024 H&P:\"82 yo M who fell down 3 steps and bumped the back of his head. Unsure if he lost consciousness or not. Patient currently takes Eliquis for ischemic stroke in 10/23. Patient has history of falls. 1. acute subarachnoid hemorrhage involving L frontal lobe\"      Clinical Indicators: 2024 CTH:There is increased surrounding hypodensity, likely  representing worsening vasogenic edema. There is associated sulcal  effacement and new midline shift of approximately 0.3 cm. Further,  there is slight interval increase in associated subarachnoid  hemorrhage within the adjacent sulci.    Treatment: ICU admit, q1 neuro checks, serial CTH    Risk Factors: s/p fall down steps, on Eliquis  Options provided:  -- Brain compression non-traumatic  -- Brain compression traumatic  -- Midline shift or mass effect without brain compression  -- Other - I will add my own diagnosis  -- Refer to Clinical Documentation Reviewer    Query created by: Lissett Wan on 2024 9:44 AM      Electronically signed " by:  SHOAIB KWOK MD 2/14/2024 11:06 AM

## 2024-02-14 NOTE — PROGRESS NOTES
[facility]  TRAUMA - PROGRESS NOTE    Patient Name: Johnnie Rudolph  MRN: [id number]  Admit Date: 211  : 1940  AGE: 83 y.o.   GENDER: male  =================================================================  MECHANISM OF INJURY:   Fall down steps  LOC (yes/no?): Unknown  Anticoagulant / Anti-platelet Rx? (for what dx?): xarelto, AF and ischemic stroke hx (last dose am )  Referring Facility Name (N/A for scene EMR run): [facility]     INJURIES:   Trace acute SAH, left frontal lobe     OTHER MEDICAL PROBLEMS:  Ischemic stroke (10/23)  HTN  A fib   DM II  Glaucoma     INCIDENTAL FINDINGS:  None     PROCEDURES:    =================================================================    TODAY'S ASSESSMENT AND PLAN OF CARE:  Johnnie Rudolph is a 83 y.o. male in the ICU due to: ICH and Q1 hour neuro checks. CT head repeat with worsening bleed, given andexa given xarelto usage, last dose  am.    -NSGY following - appreciate recs              - EEG  - Continue to hold xarelto  -Continue q1hr neuro checks  -Recommend dobhoff for feeds if family agreeable  -Continue ICU level of care     Patient seen and discussed with Attending, Dr. Buenrostro.    Cade Stoll MD  General Surgery Resident  Trauma Surgery    ==============================================================================  CHIEF COMPLAINT / OVERNIGHT EVENTS / HPI:   NAEO    MEDICAL HISTORY / ROS:  Admission history and ROS reviewed. Pertinent changes as follows:      PHYSICAL EXAM:  Heart Rate:  [67-95]   Temp:  [36.1 °C (97 °F)-37.6 °C (99.6 °F)]   Resp:  [14-25]   BP: (115-179)/()   SpO2:  [91 %-96 %]     General: NAD, awake and alert, conversive  HEENT: Normocephalic, atraumatic  Neuro: GCS 10 E2V2M6  Cardio: Bradycardic episodes, asymptomatic  Resp: Unlabored breathing on RA  Abdomen: Soft, nontender, nondistended  : External catheter in place draining clear yellow urine  MSK: BAUM, normal ROM. Strength  5/5  Extremities: Normal extremities, no edema or cyanosis  Skin: Warm and dry,  no new lesions or rashes  Psych: Appropriate mood and behavior    IMAGING SUMMARY:  (summary of new imaging findings, not a copy of dictation)  Reviewed     LABS:  Results from last 7 days   Lab Units 02/14/24 0229 02/13/24 0228 02/12/24  0107 02/11/24  1815 02/11/24  1316   WBC AUTO x10*3/uL 11.9* 11.9* 11.0 12.9* 7.5   HEMOGLOBIN g/dL 12.0* 12.6* 13.7 13.6 13.0*   HEMATOCRIT % 34.3* 36.3* 39.6* 37.0* 37.2*   PLATELETS AUTO x10*3/uL 153 177 203 208 201   NEUTROS PCT AUTO %  --   --   --  79.4 61.0   LYMPHS PCT AUTO %  --   --   --  12.1 25.7   MONOS PCT AUTO %  --   --   --  7.4 10.5   EOS PCT AUTO %  --   --   --  0.3 1.3       Results from last 7 days   Lab Units 02/14/24 0229 02/13/24 0228 02/11/24  1719   APTT seconds 28 33 43*   INR  1.4* 1.3* 2.9*       Results from last 7 days   Lab Units 02/14/24 0229 02/13/24  0353 02/13/24 0228 02/12/24  0107 02/11/24  1815 02/11/24  1316   SODIUM mmol/L 134* 135* 133*   < > 142 137   POTASSIUM mmol/L 3.1* 3.7 5.4*   < > 3.6 3.7   CHLORIDE mmol/L 106 105 101   < > 106 101   CO2 mmol/L 21 24 25   < > 26 27   BUN mg/dL 12 12 13   < > 12 13   CREATININE mg/dL 0.51 0.61 0.65   < > 0.69 0.83   CALCIUM mg/dL 6.7* 7.4* 8.1*   < > 9.5 9.3   PROTEIN TOTAL g/dL  --   --   --   --  6.2* 7.1   BILIRUBIN TOTAL mg/dL  --   --   --   --  0.9 1.0   ALK PHOS U/L  --   --   --   --  81 92   ALT U/L  --   --   --   --  27 31   AST U/L  --   --   --   --  34 29   GLUCOSE mg/dL 116* 126* 128*   < > 86 197*    < > = values in this interval not displayed.       Results from last 7 days   Lab Units 02/11/24  1815 02/11/24  1316   BILIRUBIN TOTAL mg/dL 0.9 1.0           I have reviewed all medications, laboratory results, and imaging pertinent for today's encounter.

## 2024-02-14 NOTE — PROGRESS NOTES
Occupational Therapy    Evaluation and Treatment    Patient Name: Johnnie Rudolph  MRN: [id number]  Today's Date: 2/14/2024  Time Calculation  Start Time: 0952  Stop Time: 1034  Time Calculation (min): 42 min    Assessment  IP OT Assessment  OT Assessment: Pt demonstrates deficits in cognition, strength, endurance, functional mobility, and ADL status impacting occupational performance  Prognosis: Good  Evaluation/Treatment Tolerance: Patient tolerated treatment well  End of Session Communication: Bedside nurse  End of Session Patient Position: Bed, 3 rail up, Alarm on  Plan:  Treatment Interventions: ADL retraining, Functional transfer training, Endurance training, Cognitive reorientation, Patient/family training, Equipment evaluation/education, Neuromuscular reeducation, Fine motor coordination activities, Compensatory technique education  OT Frequency: 3 times per week  OT Discharge Recommendations: Moderate intensity level of continued care  OT Recommended Transfer Status: Dependent  OT - OK to Discharge: Yes    Subjective   Current Problem:  1. Intraparenchymal hemorrhage of brain (CMS/HCC)        2. Subdural hematoma (CMS/HCC)        3. Syncope and collapse  CANCELED: Transthoracic Echo (TTE) Limited    CANCELED: Transthoracic Echo (TTE) Limited      4. Traumatic subarachnoid hemorrhage with unknown loss of consciousness status, initial encounter (CMS/HCC)  Referral to Neurosurgery      5. Cerebrovascular accident (CVA) due to embolism of left middle cerebral artery (CMS/[facility])  Referral to Neurosurgery    CT head wo IV contrast        General:  Reason for Referral: Pt fell down steps, found to have Trace acute subarachnoid hemorrhage involving L frontal lobe/Intraparenchymal hemorrhage of brain. Bleed worsened 2/13. Now stable CTH.  Past Medical History Relevant to Rehab: Ischemic stroke (10/23), HTN, A-Fib, T2DM, Glaucoma, cataracts  Prior to Session Communication: Bedside nurse  Patient Position Received: Bed,  3 rail up  Family/Caregiver Present: No  General Comment: Pt asleep at start of session, difficult to arouse. pt wearing mitts, removed mitts for session and pt did not reach for lines, mitts donned prior to OT departure.   Precautions:  Medical Precautions: Fall precautions  Vital Signs:  Heart Rate: 67 (post: 63)  Resp: 22 (post: 18)  SpO2: 91 % (post: 95)  BP: 156/80 (EOB BP: 146/78, post: 123/87)  MAP (mmHg): 102 (post: 95)  Pain:  Pain Assessment  Pain Assessment: 0-10  Pain Score: 0 - No pain (pt indicated no pain with head shake)  Lines/Tubes/Drains:  External Urinary Catheter (Active)   Number of days: 2         Objective   Cognition:  Overall Cognitive Status: Impaired  Orientation Level:  (Pt unable to elaborate. Indicated orientation to self with choices and head nod/shake.)  Following Commands:  (Pt followed ~25% of commands throuhgout session. Pt intermittently nodded head, opened eyes, and sqeezed hands when cued. Pt required increased time and repetition. Pt followed greater % of commands when commands coupled with sternal rub/tactile stim)  Cognition Comments: [place] level III. Pt drowsy throughout session requiring max encouragement for eye opening and participation. Pt attempted speech intermittently throughout session, speech garbled/mumbled with attempts indicating potential expressive language deficit. Pt able to shake/nod head intermittently.  Insight: Moderate  Processing Speed: Delayed     Confusion Assessment Method (CAM)  Acute Onset and Fluctuating Course (1A):  (pt untestable due to command following deficit and mental status)     Home Living:  Type of Home: House  Lives With: Significant other  Home Adaptive Equipment: Cane  Home Layout: One level, Work area in basement, Able to live on main level with bedroom/bathroom  Home Access: Stairs to enter with rails  Entrance Stairs-Rails: Both  Bathroom Shower/Tub: Tub/shower unit  Bathroom Equipment: None  Home Living Comments:  Home living gathered from PT report. Pt unable to articulate on this date.   Prior Function:  Level of [place]: Independent with ADLs and functional transfers, Independent with homemaking with ambulation  ADL Assistance: Independent  Homemaking Assistance: Independent  Ambulatory Assistance: Independent  Leisure: watching TV  Hand Dominance: Right  Prior Function Comments: (+) drives. PLF gathered from PT report, pt unable to articulate on this date.    ADL:  Eating Assistance: Total  Grooming Assistance: Total (pt provided wet towel to perform facial hygiene. Pt able to grasp towel in RUE, not able to intiate grooming task. Pt provided [place] assist to complete task, intermittently attempted participation with trace finger movements.)  Bathing Assistance: Total  UE Dressing Assistance: Total (pt doffed/donned gown at bed level dependently.)  LE Dressing Assistance: Total  Toileting Assistance with Device: Total (perineal hygiene at bed level dependently with R and L rolls. Pt required MIN A for R roll and Max A for L roll.)  Activity Tolerance:  Endurance: Tolerates 10 - 20 min exercise with multiple rests  Early Mobility/Exercise Safety Screen: Proceed with mobilization - No exclusion criteria met  Balance:  Static Sitting Balance  Static Sitting-Balance Support: Feet supported  Static Sitting-Level of Assistance: Contact guard, Maximum assistance  Static Sitting-Comment/Number of Minutes: Pt tolerated sitting EOB ~ 10 min and fluctuated between CGA and MAX A. Pt demonstrated strong R lean preference and slight posterior lean. Pt unable to correct R lean preference without verbal and tactile cues.  Bed Mobility/Transfers: Bed Mobility  Bed Mobility: Yes  Bed Mobility 1  Bed Mobility 1: Supine to sitting  Level of Assistance 1: Dependent  Bed Mobility Comments 1: DEPx2, HOB elevated, draw sheet.  Bed Mobility 2  Bed Mobility  2: Sitting to supine  Level of Assistance 2: Dependent  Bed Mobility Comments 2: DEPx2,  HOB elevated, draw sheet  Bed Mobility 3  Bed Mobility 3: Rolling right  Level of Assistance 3: Minimum assistance  Bed Mobility Comments 3: pt required MIN Ax1 to roll R during perineal hygiene tasks.  Bed Mobility 4  Bed Mobility 4: Rolling left  Level of Assistance 4: Maximum assistance  Bed Mobility Comments 4: pt required MAXAx1 for L roll during perineal hygiene and required MAX A to maintain position.   and      Vision: Eyes closed majority of session, opened w/stim but didn't sustain   Sensation:  Light Touch: No apparent deficits  Strength:  Strength Comments: Based on observation, BUE strength >/=3/5.    Coordination:  Coordination Comment: Moved BUE spontaneously and to commands (intermittently). Pt demonstrated more spontaneous movement with LUE compared to RUE. Pt withdrew to noxious stim on nail bed on RUE and BLE, not to LUE.     Treatment Completed on Evaluation    Therapy/Activity:      Balance/Neuromuscular Re-Education  Balance/Neuromuscular Re-Education Activity Performed: Yes  Balance/Neuromuscular Re-Education Activity 1: pt tolerated sitting EOB ~10min and required fluctuating assist from CGA-MAX A due to strong right lean preference and slight posterior lean. Pt was provided tactile and verbal cues to maintain midline alignment. Pt intermittently able to hold self up without assist. Pt did not demonstrate righting reflexes when exiting midline position. Pt showed increased level of arousal EOB compared to supine.    Outcome Measures: [facility] Daily Activity  Putting on and taking off regular lower body clothing: Total  Bathing (including washing, rinsing, drying): Total  Putting on and taking off regular upper body clothing: Total  Toileting, which includes using toilet, bedpan or urinal: Total  Taking care of personal grooming such as brushing teeth: Total  Eating Meals: Total  Daily Activity - Total Score: 6        ICU Mobility Screen  Early Mobility/Exercise Safety Screen: Proceed with  mobilization - No exclusion criteria met,          Education Documentation  Body Mechanics, taught by TJ Altamirano at 2/14/2024 11:22 AM.  Learner: Patient  Readiness: Acceptance  Method: Explanation, Demonstration  Response: Needs Reinforcement    ADL Training, taught by TJ Altamirano at 2/14/2024 11:22 AM.  Learner: Patient  Readiness: Acceptance  Method: Explanation, Demonstration  Response: Needs Reinforcement    Education Comments  No comments found.        Goals:   Encounter Problems       Encounter Problems (Active)       ADLs       Patient with complete upper body dressing with minimal assist donning and doffing all UE clothes with PRN adaptive equipment       Start:  02/14/24    Expected End:  02/28/24            Patient with complete lower body dressing with minimal assist donning and doffing all LE clothes  with PRN adaptive equipment       Start:  02/14/24    Expected End:  02/28/24            Patient will complete oral hygiene with set-up level of assistance and PRN adaptive equipment.       Start:  02/14/24    Expected End:  02/28/24               BALANCE       Patient will maintain static sitting balance with contact guard assist for 5 minutes in order to demonstrate decreased risk of falling and improved postural control.       Start:  02/14/24    Expected End:  02/28/24               COGNITION/SAFETY       Patient will follow 75% or more Simple commands to allow improved ADL performance.       Start:  02/14/24    Expected End:  02/28/24            Patient will demonstrate orientation x 3 with min verbal cues       Start:  02/14/24    Expected End:  02/28/24       ORIENTATION            TRANSFERS       Patient will perform bed mobility with minimal assist and PRN adaptive equipment in order to improve safety and independence with mobility       Start:  02/14/24    Expected End:  02/28/24 02/14/24 at 3:31 PM   TJ ALTAMIRANO   Rehab Office: [phone]

## 2024-02-15 ENCOUNTER — APPOINTMENT (OUTPATIENT)
Dept: RADIOLOGY | Facility: HOSPITAL | Age: 84
DRG: 082 | End: 2024-02-15
Payer: MEDICARE

## 2024-02-15 LAB
ALBUMIN SERPL BCP-MCNC: 3 G/DL (ref 3.4–5)
ANION GAP SERPL CALC-SCNC: 12 MMOL/L (ref 10–20)
APTT PPP: 31 SECONDS (ref 27–38)
BUN SERPL-MCNC: 15 MG/DL (ref 6–23)
CA-I BLD-SCNC: 0.99 MMOL/L (ref 1.1–1.33)
CALCIUM SERPL-MCNC: 7.6 MG/DL (ref 8.6–10.6)
CHLORIDE SERPL-SCNC: 99 MMOL/L (ref 98–107)
CO2 SERPL-SCNC: 22 MMOL/L (ref 21–32)
CREAT SERPL-MCNC: 0.51 MG/DL (ref 0.5–1.3)
EGFRCR SERPLBLD CKD-EPI 2021: >90 ML/MIN/1.73M*2
ERYTHROCYTE [DISTWIDTH] IN BLOOD BY AUTOMATED COUNT: 12.8 % (ref 11.5–14.5)
GLUCOSE BLD MANUAL STRIP-MCNC: 165 MG/DL (ref 74–99)
GLUCOSE BLD MANUAL STRIP-MCNC: 169 MG/DL (ref 74–99)
GLUCOSE BLD MANUAL STRIP-MCNC: 173 MG/DL (ref 74–99)
GLUCOSE BLD MANUAL STRIP-MCNC: 190 MG/DL (ref 74–99)
GLUCOSE BLD MANUAL STRIP-MCNC: 228 MG/DL (ref 74–99)
GLUCOSE SERPL-MCNC: 181 MG/DL (ref 74–99)
HCT VFR BLD AUTO: 35.3 % (ref 41–52)
HGB BLD-MCNC: 12.7 G/DL (ref 13.5–17.5)
INR PPP: 1.4 (ref 0.9–1.1)
MAGNESIUM SERPL-MCNC: 1.85 MG/DL (ref 1.6–2.4)
MCH RBC QN AUTO: 33.4 PG (ref 26–34)
MCHC RBC AUTO-ENTMCNC: 36 G/DL (ref 32–36)
MCV RBC AUTO: 93 FL (ref 80–100)
NRBC BLD-RTO: 0 /100 WBCS (ref 0–0)
PHOSPHATE SERPL-MCNC: 2.7 MG/DL (ref 2.5–4.9)
PLATELET # BLD AUTO: 185 X10*3/UL (ref 150–450)
POTASSIUM SERPL-SCNC: 4.6 MMOL/L (ref 3.5–5.3)
PROTHROMBIN TIME: 16.3 SECONDS (ref 9.8–12.8)
RBC # BLD AUTO: 3.8 X10*6/UL (ref 4.5–5.9)
SODIUM SERPL-SCNC: 128 MMOL/L (ref 136–145)
WBC # BLD AUTO: 13.2 X10*3/UL (ref 4.4–11.3)

## 2024-02-15 PROCEDURE — 82947 ASSAY GLUCOSE BLOOD QUANT: CPT

## 2024-02-15 PROCEDURE — 82330 ASSAY OF CALCIUM: CPT | Performed by: STUDENT IN AN ORGANIZED HEALTH CARE EDUCATION/TRAINING PROGRAM

## 2024-02-15 PROCEDURE — 2500000001 HC RX 250 WO HCPCS SELF ADMINISTERED DRUGS (ALT 637 FOR MEDICARE OP)

## 2024-02-15 PROCEDURE — 2500000004 HC RX 250 GENERAL PHARMACY W/ HCPCS (ALT 636 FOR OP/ED): Performed by: STUDENT IN AN ORGANIZED HEALTH CARE EDUCATION/TRAINING PROGRAM

## 2024-02-15 PROCEDURE — 99291 CRITICAL CARE FIRST HOUR: CPT | Performed by: STUDENT IN AN ORGANIZED HEALTH CARE EDUCATION/TRAINING PROGRAM

## 2024-02-15 PROCEDURE — 83735 ASSAY OF MAGNESIUM: CPT | Performed by: STUDENT IN AN ORGANIZED HEALTH CARE EDUCATION/TRAINING PROGRAM

## 2024-02-15 PROCEDURE — 71045 X-RAY EXAM CHEST 1 VIEW: CPT

## 2024-02-15 PROCEDURE — 92610 EVALUATE SWALLOWING FUNCTION: CPT | Mod: GN

## 2024-02-15 PROCEDURE — 94760 N-INVAS EAR/PLS OXIMETRY 1: CPT

## 2024-02-15 PROCEDURE — 36415 COLL VENOUS BLD VENIPUNCTURE: CPT | Performed by: STUDENT IN AN ORGANIZED HEALTH CARE EDUCATION/TRAINING PROGRAM

## 2024-02-15 PROCEDURE — 2500000004 HC RX 250 GENERAL PHARMACY W/ HCPCS (ALT 636 FOR OP/ED)

## 2024-02-15 PROCEDURE — 94667 MNPJ CHEST WALL 1ST: CPT

## 2024-02-15 PROCEDURE — 2500000004 HC RX 250 GENERAL PHARMACY W/ HCPCS (ALT 636 FOR OP/ED): Performed by: NURSE PRACTITIONER

## 2024-02-15 PROCEDURE — 71045 X-RAY EXAM CHEST 1 VIEW: CPT | Performed by: RADIOLOGY

## 2024-02-15 PROCEDURE — 2500000004 HC RX 250 GENERAL PHARMACY W/ HCPCS (ALT 636 FOR OP/ED): Performed by: PHYSICIAN ASSISTANT

## 2024-02-15 PROCEDURE — 2500000005 HC RX 250 GENERAL PHARMACY W/O HCPCS

## 2024-02-15 PROCEDURE — 80069 RENAL FUNCTION PANEL: CPT | Performed by: STUDENT IN AN ORGANIZED HEALTH CARE EDUCATION/TRAINING PROGRAM

## 2024-02-15 PROCEDURE — 85610 PROTHROMBIN TIME: CPT | Performed by: STUDENT IN AN ORGANIZED HEALTH CARE EDUCATION/TRAINING PROGRAM

## 2024-02-15 PROCEDURE — 99232 SBSQ HOSP IP/OBS MODERATE 35: CPT | Performed by: STUDENT IN AN ORGANIZED HEALTH CARE EDUCATION/TRAINING PROGRAM

## 2024-02-15 PROCEDURE — 2500000001 HC RX 250 WO HCPCS SELF ADMINISTERED DRUGS (ALT 637 FOR MEDICARE OP): Performed by: NURSE PRACTITIONER

## 2024-02-15 PROCEDURE — C9113 INJ PANTOPRAZOLE SODIUM, VIA: HCPCS

## 2024-02-15 PROCEDURE — 85027 COMPLETE CBC AUTOMATED: CPT | Performed by: STUDENT IN AN ORGANIZED HEALTH CARE EDUCATION/TRAINING PROGRAM

## 2024-02-15 PROCEDURE — 1200000002 HC GENERAL ROOM WITH TELEMETRY DAILY

## 2024-02-15 PROCEDURE — 2500000002 HC RX 250 W HCPCS SELF ADMINISTERED DRUGS (ALT 637 FOR MEDICARE OP, ALT 636 FOR OP/ED)

## 2024-02-15 RX ORDER — LEVETIRACETAM 5 MG/ML
500 INJECTION INTRAVASCULAR ONCE
Status: DISCONTINUED | OUTPATIENT
Start: 2024-02-15 | End: 2024-02-15

## 2024-02-15 RX ORDER — MAGNESIUM SULFATE HEPTAHYDRATE 40 MG/ML
2 INJECTION, SOLUTION INTRAVENOUS ONCE
Status: COMPLETED | OUTPATIENT
Start: 2024-02-15 | End: 2024-02-15

## 2024-02-15 RX ORDER — POLYETHYLENE GLYCOL 3350 17 G/17G
17 POWDER, FOR SOLUTION ORAL DAILY
Status: DISCONTINUED | OUTPATIENT
Start: 2024-02-15 | End: 2024-02-20

## 2024-02-15 RX ORDER — QUETIAPINE FUMARATE 25 MG/1
12.5 TABLET, FILM COATED ORAL NIGHTLY
Status: DISCONTINUED | OUTPATIENT
Start: 2024-02-15 | End: 2024-02-16

## 2024-02-15 RX ORDER — CALCIUM GLUCONATE 20 MG/ML
1 INJECTION, SOLUTION INTRAVENOUS ONCE
Status: COMPLETED | OUTPATIENT
Start: 2024-02-15 | End: 2024-02-15

## 2024-02-15 RX ADMIN — POLYETHYLENE GLYCOL 3350 17 G: 17 POWDER, FOR SOLUTION ORAL at 15:22

## 2024-02-15 RX ADMIN — INSULIN LISPRO 1 UNITS: 100 INJECTION, SOLUTION INTRAVENOUS; SUBCUTANEOUS at 04:54

## 2024-02-15 RX ADMIN — CALCIUM GLUCONATE 1 G: 20 INJECTION, SOLUTION INTRAVENOUS at 04:54

## 2024-02-15 RX ADMIN — METOPROLOL TARTRATE 5 MG: 1 INJECTION, SOLUTION INTRAVENOUS at 01:19

## 2024-02-15 RX ADMIN — QUETIAPINE 12.5 MG: 25 TABLET, FILM COATED ORAL at 21:00

## 2024-02-15 RX ADMIN — INSULIN LISPRO 1 UNITS: 100 INJECTION, SOLUTION INTRAVENOUS; SUBCUTANEOUS at 20:27

## 2024-02-15 RX ADMIN — ENOXAPARIN SODIUM 30 MG: 100 INJECTION SUBCUTANEOUS at 09:57

## 2024-02-15 RX ADMIN — INSULIN LISPRO 2 UNITS: 100 INJECTION, SOLUTION INTRAVENOUS; SUBCUTANEOUS at 16:03

## 2024-02-15 RX ADMIN — NYSTATIN 500000 UNITS: 100000 SUSPENSION ORAL at 17:18

## 2024-02-15 RX ADMIN — MAGNESIUM SULFATE HEPTAHYDRATE 2 G: 40 INJECTION, SOLUTION INTRAVENOUS at 04:54

## 2024-02-15 RX ADMIN — NYSTATIN 500000 UNITS: 100000 SUSPENSION ORAL at 14:48

## 2024-02-15 RX ADMIN — METOPROLOL TARTRATE 5 MG: 1 INJECTION, SOLUTION INTRAVENOUS at 15:23

## 2024-02-15 RX ADMIN — LEVETIRACETAM 500 MG: 5 INJECTION INTRAVENOUS at 20:22

## 2024-02-15 RX ADMIN — LEVETIRACETAM 500 MG: 5 INJECTION INTRAVENOUS at 09:57

## 2024-02-15 RX ADMIN — NYSTATIN 500000 UNITS: 100000 SUSPENSION ORAL at 20:22

## 2024-02-15 RX ADMIN — INSULIN LISPRO 1 UNITS: 100 INJECTION, SOLUTION INTRAVENOUS; SUBCUTANEOUS at 09:56

## 2024-02-15 RX ADMIN — METOPROLOL TARTRATE 5 MG: 1 INJECTION, SOLUTION INTRAVENOUS at 20:22

## 2024-02-15 RX ADMIN — ATORVASTATIN CALCIUM 80 MG: 80 TABLET, FILM COATED ORAL at 20:22

## 2024-02-15 RX ADMIN — INSULIN LISPRO 1 UNITS: 100 INJECTION, SOLUTION INTRAVENOUS; SUBCUTANEOUS at 01:16

## 2024-02-15 RX ADMIN — PANTOPRAZOLE SODIUM 40 MG: 40 INJECTION, POWDER, FOR SOLUTION INTRAVENOUS at 09:58

## 2024-02-15 RX ADMIN — NYSTATIN 500000 UNITS: 100000 SUSPENSION ORAL at 09:58

## 2024-02-15 RX ADMIN — METOPROLOL TARTRATE 5 MG: 1 INJECTION, SOLUTION INTRAVENOUS at 09:58

## 2024-02-15 RX ADMIN — ENOXAPARIN SODIUM 30 MG: 100 INJECTION SUBCUTANEOUS at 20:22

## 2024-02-15 ASSESSMENT — PAIN - FUNCTIONAL ASSESSMENT
PAIN_FUNCTIONAL_ASSESSMENT: 0-10
PAIN_FUNCTIONAL_ASSESSMENT: CPOT (CRITICAL CARE PAIN OBSERVATION TOOL)
PAIN_FUNCTIONAL_ASSESSMENT: CPOT (CRITICAL CARE PAIN OBSERVATION TOOL)
PAIN_FUNCTIONAL_ASSESSMENT: 0-10

## 2024-02-15 ASSESSMENT — PAIN SCALES - GENERAL: PAINLEVEL_OUTOF10: 0 - NO PAIN

## 2024-02-15 NOTE — PROGRESS NOTES
"Speech-Language Pathology  Adult Inpatient Clinical Bedside Swallow Evaluation    Patient Name: Johnnie Rudolph  MRN: [id number]  Today's Date: 2/15/2024   Start Time: 1030  Stop Time: 1045  Time Calculation (min): 15 min    History of Present Illness:   Per EMR: \"84 yo M who fell down 3 steps and bumped the back of his head. Unsure if he lost consciousness or not. Patient currently takes Eliquis for ischemic stroke in 10/23. Patient has history of falls.\"    Assessment:   Clinical bedside swallow evaluation completed to assess PO readiness or need for further instrumental testing. Pt received asleep in bed. Pt somnolent w/ inconsistent responses to verbal and tactile cues. Pt did not answer orientation questions or follow simple commands. Eyes remained closed throughout session, however was utilizing mitt to push SLP away. SLP provided oral care via [place]. Pt w/ poor secretion management e/b wet/gurgly respirations and secretions extracted via [place]. SLP placed ice chip on labial surface and pt made limited attempt to lick water off of lips. SLP placed dry spoon into oral cavity to assess oral acceptance. Pt did not make an attempt to produce bilabial closure around utensil. Pt currently deemed not safe for oral trials given current mentation and inability manage oropharyngeal secretions, placing pt at high risk for aspiration. Recommend NPO. SLP to follow for ongoing assessment once pt is consistently awake/alert.      Recommendations:  NPO  Frequent and aggressive oral care to minimize grand colonization of bacteria in oral cavity.  Continued alternate form of nutrition/hydration.      Goal:   Pt will tolerate least restrictive diet w/ no clinical c/f aspiration and follow/recall safe swallow guidelines to 100% acc.        Plan:  SLP Services Indicated: Yes  Frequency: 2x week  Discussed POC with patient  SLP - OK to Discharge    Pain:   0-10  0 = No pain.     Inpatient Education:  Extensive education provided " to patient regarding current swallow function, recommendations/results, and POC.      Consultations/Referrals/Coordination of Services:   N/A    Rashmi Richard - Student SLP  Supervising SLP was present, actively participated, and made all clinical decisions during session. Jazzmine Farnsworth M.A., CCC-SLP

## 2024-02-15 NOTE — CONSULTS
"Nutrition Initial Assessment:   Nutrition Assessment    Reason for Assessment: Tube feeding recommendations    Patient is a 83 y.o. male presenting after a fall.   CTH small LF tSAH, contusion, rCTH worsening contusion, s/p andexxa, rrCTH increased ICH and MLS.     Failed swallow eval with SLP today.     Currently receiving Pivot 1.5 @ 45ml/hr via small-bore feeding tube (based on documentation, TF was started at a trickle at 5pm yesterday - team placed goal rate for 60ml/hr, which would provide 2160kcals, 135g pro)      Nutrition History:  Food and Nutrient History: Unable to obtain information from pt (AMS, no family present).  Food Allergies/Intolerances:  None documented        Anthropometrics:  Height: 185.4 cm (6' 0.99\") (6'1\")   Weight: 95.1 kg (209 lb 10.5 oz)   BMI (Calculated): 27.67  IBW/kg (Dietitian Calculated): 83.6 kg  Percent of IBW: 114 %  Adjusted Body Weight (kg): 86.5 kg    Weight History:   Wt Readings from Last 10 Encounters:   02/15/24 95.1 kg (209 lb 10.5 oz)   02/11/24 95.5 kg (210 lb 8.6 oz)   11/08/23 94.1 kg (207 lb 6.4 oz)   10/19/23 94.8 kg (209 lb)   10/13/23 97.8 kg (215 lb 9.8 oz)   07/24/23 94.3 kg (208 lb)     Weight Change %:  Weight History / % Weight Change: Weight appears stable over the past year  Significant Weight Loss: No    Nutrition Focused Physical Exam Findings:  Somewhat limited; findings may be reflective of age  Subcutaneous Fat Loss:   Orbital Fat Pads: Mild-Moderate (slight dark circles and slight hollowing)  Buccal Fat Pads: Mild-Moderate (flat cheeks, minimal bounce)  Triceps: Mild-moderate (less than ample fat tissue)  Muscle Wasting:  Temporalis: Mild-Moderate (slight depression)  Pectoralis (Clavicular Region): Severe (protruding prominent clavicle)  Deltoid/Trapezius: Severe (squared shoulders, acromion process prominent)  Gastrocnemius: Mild-Moderate (not well developed muscle)  Edema:  Edema: +1 trace  Edema Location: generalized noted  Physical " Findings:  Limited    Nutrition Significant Labs:  CBC Trend:   Results from last 7 days   Lab Units 02/15/24  0112 02/14/24 0229 02/13/24 0228 02/12/24  0107   WBC AUTO x10*3/uL 13.2* 11.9* 11.9* 11.0   RBC AUTO x10*6/uL 3.80* 3.67* 3.88* 4.15*   HEMOGLOBIN g/dL 12.7* 12.0* 12.6* 13.7   HEMATOCRIT % 35.3* 34.3* 36.3* 39.6*   MCV fL 93 94 94 95   PLATELETS AUTO x10*3/uL 185 153 177 203   BMP Trend:   Results from last 7 days   Lab Units 02/15/24  0112 02/14/24 0229 02/13/24 0353 02/13/24 0228   GLUCOSE mg/dL 181* 116* 126* 128*   CALCIUM mg/dL 7.6* 6.7* 7.4* 8.1*   SODIUM mmol/L 128* 134* 135* 133*   POTASSIUM mmol/L 4.6 3.1* 3.7 5.4*   CO2 mmol/L 22 21 24 25   CHLORIDE mmol/L 99 106 105 101   BUN mg/dL 15 12 12 13   CREATININE mg/dL 0.51 0.51 0.61 0.65   A1C:  Lab Results   Component Value Date    HGBA1C 7.1 (H) 10/13/2023   BG POCT trend:   Results from last 7 days   Lab Units 02/15/24  0803 02/15/24  0335 02/15/24  0110 02/14/24 2016 02/14/24  1606   POCT GLUCOSE mg/dL 190* 165* 173* 168* 157*   Renal Lab Trend:   Results from last 7 days   Lab Units 02/15/24  0112 02/14/24 0229 02/13/24 0353 02/13/24 0228   POTASSIUM mmol/L 4.6 3.1* 3.7 5.4*   PHOSPHORUS mg/dL 2.7 2.3* 2.7 3.2   SODIUM mmol/L 128* 134* 135* 133*   MAGNESIUM mg/dL 1.85 1.35*  --  1.72   EGFR mL/min/1.73m*2 >90 >90 >90 >90   BUN mg/dL 15 12 12 13   CREATININE mg/dL 0.51 0.51 0.61 0.65     Nutrition Specific Medications:  Scheduled medications  atorvastatin, 80 mg, oral, Nightly  insulin lispro, 0-5 Units, subcutaneous, q4h  metoprolol, 5 mg, intravenous, q6h  pantoprazole, 40 mg, intravenous, Daily    I/O:   Last BM Date: 02/14/24; Stool Appearance: Loose (02/14/24 2143)    Dietary Orders (From admission, onward)       Start     Ordered    02/15/24 1023  Enteral feeding with NPO Pivot 1.5; 60; 450; Water; Tap water; Every 6 hours  Diet effective now        Question Answer Comment   Tube feeding formula: Pivot 1.5    Tube feeding  continuous rate (mL/hr): 60    Tube feeding flush (mL): 450    Flush type: Water    Water type: Tap water    Flush frequency: Every 6 hours        02/15/24 1023         Estimated Needs:   Total Energy Estimated Needs (kCal): 2100 kCal  Method for Estimating Needs: MSJ x AF&SF; kcal/kg  Total Protein Estimated Needs (g): 110 g  Method for Estimating Needs: range of 100-115 (1.2-1.4g/kg per IBW)  Total Fluid Estimated Needs (mL):  (1ml/kcal or per team)           Nutrition Diagnosis   Malnutrition Diagnosis  Patient has Malnutrition Diagnosis:  (weight appears stable; unable to obtain nutrition hx; NFPE findings with wasting, however, ?age-related)    Nutrition Diagnosis  Patient has Nutrition Diagnosis: Yes  Diagnosis Status (1): New  Nutrition Diagnosis 1: Increased nutrient needs  Related to (1): increased metabolic demand  As Evidenced by (1): ICH  Additional Nutrition Diagnosis: Diagnosis 2  Diagnosis Status (2): New  Nutrition Diagnosis 2: Swallowing difficulity  Related to (2): stroke  As Evidenced by (2): failed swallow eval with SLP       Nutrition Interventions/Recommendations         Nutrition Prescription:  Recommend to change formula to the follwoing:   Start Glucerna 1.5 @ 20ml/hr, increase by 10mls every 6-8hrs to reach goal of 60ml/hr.   Team to adjust water flushes in setting hyponatremia (TF provides 1093mls free H2O).   Please reconsult if lytes begin to downtrend and/or if pt requires fluid restriction (beyond adjusting water flushes)        Nutrition Interventions:   Interventions: Enteral intake  Goal: Glucerna 1.5 @ 60ml/hr = 1440mls, 2160kcals, 118g pro, 1093mls free H2O, 192g CHO       *Team was informed of updated TF recommendations    Nutrition Monitoring and Evaluation   Food/Nutrient Related History Monitoring  Monitoring and Evaluation Plan: Enteral and parenteral nutrition intake    Body Composition/Growth/Weight History  Monitoring and Evaluation Plan: Weight    Biochemical Data,  Medical Tests and Procedures  Monitoring and Evaluation Plan: Electrolyte/renal panel, Glucose/endocrine profile            Time Spent/Follow-up Reminder:   Time Spent (min): 60 minutes  Last Date of Nutrition Visit: 02/15/24  Nutrition Follow-Up Needed?: Dietitian to reassess per policy  Follow up Comment: changing TF to Glucerna 1.5

## 2024-02-15 NOTE — PROGRESS NOTES
[facility]  TRAUMA - PROGRESS NOTE    Patient Name: Johnnie Rudolph  MRN: [id number]  Admit Date: 211  : 1940  AGE: 83 y.o.   GENDER: male  =================================================================  MECHANISM OF INJURY:   Fall down steps  LOC (yes/no?): Unknown  Anticoagulant / Anti-platelet Rx? (for what dx?): xarelto, AF and ischemic stroke hx (last dose am )  Referring Facility Name (N/A for scene EMR run): [facility]     INJURIES:   Trace acute SAH, left frontal lobe     OTHER MEDICAL PROBLEMS:  Ischemic stroke (10/23)  HTN  A fib   DM II  Glaucoma     INCIDENTAL FINDINGS:  None     PROCEDURES:    =================================================================    TODAY'S ASSESSMENT AND PLAN OF CARE:  Johnnie Rudolph is a 83 y.o. male in the ICU due to: ICH and Q1 hour neuro checks. CT head repeat with worsening bleed, given andexa given xarelto usage, last dose  am.    -NSGY following - appreciate recs  - Continue to hold xarelto  -Continue q2hr neuro checks  -Continue Tfs  -Provide oral hygiene  -Ongoing discussions with family  -Continue ICU level of care     Patient seen and discussed with Attending, Dr. Buenrostro.    Cade Stoll MD  General Surgery Resident  Trauma Surgery    ==============================================================================  CHIEF COMPLAINT / OVERNIGHT EVENTS / HPI:   NAEO    MEDICAL HISTORY / ROS:  Admission history and ROS reviewed. Pertinent changes as follows:      PHYSICAL EXAM:  Heart Rate:  []   Temp:  [36.2 °C (97.2 °F)-37.6 °C (99.7 °F)]   Resp:  [18-27]   BP: (116-170)/()   Weight:  [95.1 kg (209 lb 10.5 oz)]   SpO2:  [91 %-95 %]     General: NAD, awake and alert, conversive  HEENT: Normocephalic, atraumatic  Neuro: GCS 10 E2V2M6  Cardio: Bradycardic episodes, asymptomatic  Resp: Unlabored breathing on RA  Abdomen: Soft, nontender, nondistended  : External catheter in place draining clear yellow  urine  MSK: BAUM, normal ROM. Strength 5/5  Extremities: Normal extremities, no edema or cyanosis  Skin: Warm and dry,  no new lesions or rashes  Psych: Appropriate mood and behavior    IMAGING SUMMARY:  (summary of new imaging findings, not a copy of dictation)  Reviewed     LABS:  Results from last 7 days   Lab Units 02/15/24  0112 02/14/24  0229 02/13/24  0228 02/12/24  0107 02/11/24 1815 02/11/24  1316   WBC AUTO x10*3/uL 13.2* 11.9* 11.9*   < > 12.9* 7.5   HEMOGLOBIN g/dL 12.7* 12.0* 12.6*   < > 13.6 13.0*   HEMATOCRIT % 35.3* 34.3* 36.3*   < > 37.0* 37.2*   PLATELETS AUTO x10*3/uL 185 153 177   < > 208 201   NEUTROS PCT AUTO %  --   --   --   --  79.4 61.0   LYMPHS PCT AUTO %  --   --   --   --  12.1 25.7   MONOS PCT AUTO %  --   --   --   --  7.4 10.5   EOS PCT AUTO %  --   --   --   --  0.3 1.3    < > = values in this interval not displayed.       Results from last 7 days   Lab Units 02/15/24  0112 02/14/24  0229 02/13/24  0228   APTT seconds 31 28 33   INR  1.4* 1.4* 1.3*       Results from last 7 days   Lab Units 02/15/24  0112 02/14/24 0229 02/13/24  0353 02/12/24 0107 02/11/24 1815 02/11/24  1316   SODIUM mmol/L 128* 134* 135*   < > 142 137   POTASSIUM mmol/L 4.6 3.1* 3.7   < > 3.6 3.7   CHLORIDE mmol/L 99 106 105   < > 106 101   CO2 mmol/L 22 21 24   < > 26 27   BUN mg/dL 15 12 12   < > 12 13   CREATININE mg/dL 0.51 0.51 0.61   < > 0.69 0.83   CALCIUM mg/dL 7.6* 6.7* 7.4*   < > 9.5 9.3   PROTEIN TOTAL g/dL  --   --   --   --  6.2* 7.1   BILIRUBIN TOTAL mg/dL  --   --   --   --  0.9 1.0   ALK PHOS U/L  --   --   --   --  81 92   ALT U/L  --   --   --   --  27 31   AST U/L  --   --   --   --  34 29   GLUCOSE mg/dL 181* 116* 126*   < > 86 197*    < > = values in this interval not displayed.       Results from last 7 days   Lab Units 02/11/24  1815 02/11/24  1316   BILIRUBIN TOTAL mg/dL 0.9 1.0           I have reviewed all medications, laboratory results, and imaging pertinent for today's encounter.

## 2024-02-15 NOTE — PROGRESS NOTES
[facility]  TRAUMA ICU - PROGRESS NOTE    Patient Name: Johnnie Rudolph  MRN: [id number]  Admit Date: 211  : 1940  AGE: 83 y.o.   GENDER: male  =================================================================  MECHANISM OF INJURY:   Fall down steps  LOC (yes/no?): Unknown  Anticoagulant / Anti-platelet Rx? (for what dx?): xarelto, AF and ischemic stroke hx (last dose am )  Referring Facility Name (N/A for scene EMR run): [facility]     INJURIES:   Trace acute SAH, left frontal lobe     OTHER MEDICAL PROBLEMS:  Ischemic stroke (10/23)  HTN  A fib   DM II  Glaucoma     INCIDENTAL FINDINGS:  None     PROCEDURES:    =================================================================    TODAY'S ASSESSMENT AND PLAN OF CARE:  Johnnie Rudolph is a 83 y.o. male in the ICU due to: ICH and Q1 hour neuro checks. Patient transferred to NSU and continues to be followed primarily by TSICU team    NEURO/PAIN/SEDATION:   - ICH, SAH, SDH  - Baseline confusion  - Seroq Uel 25 mg at bedtime started   - Discontinued Haldol due to sedative effects  - CT head repeat with worsening bleed, given andexa given xarelto usage, last dose  am  - Repeat CT Head  showed worsening bleed, now stable  - EEG completed  - Discussed with family, he is on xarelto, is not on eliquis  - Q1 hour neuro checks     RESPIRATORY:   - Respiratory hygiene ordered   - Frequent suctioning  - Maintain SpO2 > 92%    CARDIOVASC:   - HTN - resume Lisinopril/hydrochlorothiazide when able  - PRN labetalol for SBPs < 160  - Home atorvastatin dose restarted  - Afib on Xarelto -reversed and holding  - Cardiology to follow outpatient for long-term Xarelto planning  - Continuous telemetry    GI:   - Dobhoff placed  - Glucerna 1.5 TF - goal of 60 ml/hr per nutrition recs  -  ml Q6HR in setting of hyponatremia    :   - UOP 1250 ccs  - Replete electrolytes as needed  - NS @ 75 ml/hr - discontinued     HEMATOLOGIC:    - Holding home xarelto   - Daily CBC     ENDOCRINE:   - DMII  - SSI while inpatient, takes metformin/glipizide at home   - BG range 119-165    MUSCULOSKELETAL/SKIN:   - No acute issues   - PT/OT     INFECTIOUS DISEASE:   - Leukocytosis to 13.2  - reactive from trauma, no infectious concerns     GI PROPHYLAXIS: Protonix 40 mg IV     DVT PROPHYLAXIS: SCDs BLE, start Lovenox 30 mg BID on 2/14    DISPOSITION: TSICU    Patient seen and discussed with attending, Dr. Fuentes   ==============================================================================  CHIEF COMPLAINT / OVERNIGHT EVENTS / HPI:   EEG completed, no agitation overnight. Continues to decline to eat. Code status updated to DNR CCA after family discussion. Girlfriend is POA and paperwork in paper chart.     MEDICAL HISTORY / ROS:  Admission history and ROS reviewed. Pertinent changes as follows:      PHYSICAL EXAM:  Heart Rate:  []   Temp:  [36.2 °C (97.2 °F)-37.6 °C (99.7 °F)]   Resp:  [18-27]   BP: (116-170)/()   Weight:  [95.1 kg (209 lb 10.5 oz)]   SpO2:  [91 %-95 %]     General: NAD, awake, more responsive  HEENT: Normocephalic, atraumatic, EEG leads in place   Neuro: A&O x 1, motor and strength intact  Cardio: Rate controlled a fib  Resp: Unlabored breathing on RA  Abdomen: Soft, nontender, nondistended  : External catheter in place draining clear yellow urine  MSK: BAUM, normal ROM. Strength 5/5  Extremities: Normal extremities, no edema or cyanosis  Skin: Warm and dry,  no new lesions or rashes  Psych: Appropriate mood and behavior    IMAGING SUMMARY:  (summary of new imaging findings, not a copy of dictation)  Reviewed     LABS:  Results from last 7 days   Lab Units 02/15/24  0112 02/14/24  0229 02/13/24  0228 02/12/24  0107 02/11/24  1815 02/11/24  1316   WBC AUTO x10*3/uL 13.2* 11.9* 11.9*   < > 12.9* 7.5   HEMOGLOBIN g/dL 12.7* 12.0* 12.6*   < > 13.6 13.0*   HEMATOCRIT % 35.3* 34.3* 36.3*   < > 37.0* 37.2*   PLATELETS AUTO x10*3/uL  185 153 177   < > 208 201   NEUTROS PCT AUTO %  --   --   --   --  79.4 61.0   LYMPHS PCT AUTO %  --   --   --   --  12.1 25.7   MONOS PCT AUTO %  --   --   --   --  7.4 10.5   EOS PCT AUTO %  --   --   --   --  0.3 1.3    < > = values in this interval not displayed.       Results from last 7 days   Lab Units 02/15/24  0112 02/14/24  0229 02/13/24  0228   APTT seconds 31 28 33   INR  1.4* 1.4* 1.3*       Results from last 7 days   Lab Units 02/15/24  0112 02/14/24  0229 02/13/24  0353 02/12/24 0107 02/11/24 1815 02/11/24  1316   SODIUM mmol/L 128* 134* 135*   < > 142 137   POTASSIUM mmol/L 4.6 3.1* 3.7   < > 3.6 3.7   CHLORIDE mmol/L 99 106 105   < > 106 101   CO2 mmol/L 22 21 24   < > 26 27   BUN mg/dL 15 12 12   < > 12 13   CREATININE mg/dL 0.51 0.51 0.61   < > 0.69 0.83   CALCIUM mg/dL 7.6* 6.7* 7.4*   < > 9.5 9.3   PROTEIN TOTAL g/dL  --   --   --   --  6.2* 7.1   BILIRUBIN TOTAL mg/dL  --   --   --   --  0.9 1.0   ALK PHOS U/L  --   --   --   --  81 92   ALT U/L  --   --   --   --  27 31   AST U/L  --   --   --   --  34 29   GLUCOSE mg/dL 181* 116* 126*   < > 86 197*    < > = values in this interval not displayed.       Results from last 7 days   Lab Units 02/11/24 1815 02/11/24  1316   BILIRUBIN TOTAL mg/dL 0.9 1.0           I have reviewed all medications, laboratory results, and imaging pertinent for today's encounter.

## 2024-02-16 ENCOUNTER — APPOINTMENT (OUTPATIENT)
Dept: RADIOLOGY | Facility: HOSPITAL | Age: 84
DRG: 082 | End: 2024-02-16
Payer: MEDICARE

## 2024-02-16 LAB
ADENOVIRUS RVP, VIRC: NOT DETECTED
ALBUMIN SERPL BCP-MCNC: 3 G/DL (ref 3.4–5)
ANION GAP BLDA CALCULATED.4IONS-SCNC: 11 MMO/L (ref 10–25)
ANION GAP SERPL CALC-SCNC: 14 MMOL/L (ref 10–20)
APTT PPP: 31 SECONDS (ref 27–38)
ARTERIAL PATENCY WRIST A: NEGATIVE
BASE EXCESS BLDA CALC-SCNC: 1.4 MMOL/L (ref -2–3)
BODY TEMPERATURE: ABNORMAL
BUN SERPL-MCNC: 22 MG/DL (ref 6–23)
CA-I BLD-SCNC: 1.09 MMOL/L (ref 1.1–1.33)
CA-I BLDA-SCNC: 1.1 MMOL/L (ref 1.1–1.33)
CALCIUM SERPL-MCNC: 8.1 MG/DL (ref 8.6–10.6)
CHLORIDE BLDA-SCNC: 97 MMOL/L (ref 98–107)
CHLORIDE SERPL-SCNC: 100 MMOL/L (ref 98–107)
CO2 SERPL-SCNC: 22 MMOL/L (ref 21–32)
CREAT SERPL-MCNC: 0.64 MG/DL (ref 0.5–1.3)
EGFRCR SERPLBLD CKD-EPI 2021: >90 ML/MIN/1.73M*2
ENTEROVIRUS/RHINOVIRUS RVP, VIRC: NOT DETECTED
ERYTHROCYTE [DISTWIDTH] IN BLOOD BY AUTOMATED COUNT: 12.5 % (ref 11.5–14.5)
GLUCOSE BLD MANUAL STRIP-MCNC: 195 MG/DL (ref 74–99)
GLUCOSE BLD MANUAL STRIP-MCNC: 195 MG/DL (ref 74–99)
GLUCOSE BLD MANUAL STRIP-MCNC: 201 MG/DL (ref 74–99)
GLUCOSE BLD MANUAL STRIP-MCNC: 209 MG/DL (ref 74–99)
GLUCOSE BLD MANUAL STRIP-MCNC: 228 MG/DL (ref 74–99)
GLUCOSE BLD MANUAL STRIP-MCNC: 231 MG/DL (ref 74–99)
GLUCOSE BLDA-MCNC: 252 MG/DL (ref 74–99)
GLUCOSE SERPL-MCNC: 195 MG/DL (ref 74–99)
HCO3 BLDA-SCNC: 24.9 MMOL/L (ref 22–26)
HCT VFR BLD AUTO: 33 % (ref 41–52)
HCT VFR BLD EST: 39 % (ref 41–52)
HGB BLD-MCNC: 12 G/DL (ref 13.5–17.5)
HGB BLDA-MCNC: 12.9 G/DL (ref 13.5–17.5)
HUMAN BOCAVIRUS RVP, VIRC: NOT DETECTED
HUMAN CORONAVIRUS RVP, VIRC: NOT DETECTED
INFLUENZA A , VIRC: NOT DETECTED
INFLUENZA A H1N1-09 , VIRC: NOT DETECTED
INFLUENZA B PCR, VIRC: NOT DETECTED
INHALED O2 CONCENTRATION: 28 %
INR PPP: 1.4 (ref 0.9–1.1)
LACTATE BLDA-SCNC: 1.4 MMOL/L (ref 0.4–2)
MAGNESIUM SERPL-MCNC: 1.91 MG/DL (ref 1.6–2.4)
MCH RBC QN AUTO: 32.3 PG (ref 26–34)
MCHC RBC AUTO-ENTMCNC: 36.4 G/DL (ref 32–36)
MCV RBC AUTO: 89 FL (ref 80–100)
METAPNEUMOVIRUS , VIRC: NOT DETECTED
NRBC BLD-RTO: 0 /100 WBCS (ref 0–0)
OXYHGB MFR BLDA: 91.9 % (ref 94–98)
PARAINFLUENZA PCR, VIRC: NOT DETECTED
PCO2 BLDA: 35 MM HG (ref 38–42)
PH BLDA: 7.46 PH (ref 7.38–7.42)
PHOSPHATE SERPL-MCNC: 2.9 MG/DL (ref 2.5–4.9)
PLATELET # BLD AUTO: 189 X10*3/UL (ref 150–450)
PO2 BLDA: 67 MM HG (ref 85–95)
POTASSIUM BLDA-SCNC: 4.4 MMOL/L (ref 3.5–5.3)
POTASSIUM SERPL-SCNC: 4.2 MMOL/L (ref 3.5–5.3)
PROTHROMBIN TIME: 15.8 SECONDS (ref 9.8–12.8)
RBC # BLD AUTO: 3.72 X10*6/UL (ref 4.5–5.9)
RSV PCR, RVP, VIRC: NOT DETECTED
SAO2 % BLDA: 95 % (ref 94–100)
SODIUM BLDA-SCNC: 128 MMOL/L (ref 136–145)
SODIUM SERPL-SCNC: 132 MMOL/L (ref 136–145)
SPECIMEN DRAWN FROM PATIENT: ABNORMAL
WBC # BLD AUTO: 15.4 X10*3/UL (ref 4.4–11.3)

## 2024-02-16 PROCEDURE — 2500000001 HC RX 250 WO HCPCS SELF ADMINISTERED DRUGS (ALT 637 FOR MEDICARE OP): Performed by: STUDENT IN AN ORGANIZED HEALTH CARE EDUCATION/TRAINING PROGRAM

## 2024-02-16 PROCEDURE — 83735 ASSAY OF MAGNESIUM: CPT | Performed by: STUDENT IN AN ORGANIZED HEALTH CARE EDUCATION/TRAINING PROGRAM

## 2024-02-16 PROCEDURE — 85027 COMPLETE CBC AUTOMATED: CPT | Performed by: STUDENT IN AN ORGANIZED HEALTH CARE EDUCATION/TRAINING PROGRAM

## 2024-02-16 PROCEDURE — 2500000005 HC RX 250 GENERAL PHARMACY W/O HCPCS

## 2024-02-16 PROCEDURE — 70450 CT HEAD/BRAIN W/O DYE: CPT | Performed by: RADIOLOGY

## 2024-02-16 PROCEDURE — 31720 CLEARANCE OF AIRWAYS: CPT

## 2024-02-16 PROCEDURE — 82330 ASSAY OF CALCIUM: CPT | Performed by: STUDENT IN AN ORGANIZED HEALTH CARE EDUCATION/TRAINING PROGRAM

## 2024-02-16 PROCEDURE — 2500000004 HC RX 250 GENERAL PHARMACY W/ HCPCS (ALT 636 FOR OP/ED): Performed by: NURSE PRACTITIONER

## 2024-02-16 PROCEDURE — 82947 ASSAY GLUCOSE BLOOD QUANT: CPT

## 2024-02-16 PROCEDURE — 84132 ASSAY OF SERUM POTASSIUM: CPT | Performed by: STUDENT IN AN ORGANIZED HEALTH CARE EDUCATION/TRAINING PROGRAM

## 2024-02-16 PROCEDURE — 99222 1ST HOSP IP/OBS MODERATE 55: CPT | Performed by: STUDENT IN AN ORGANIZED HEALTH CARE EDUCATION/TRAINING PROGRAM

## 2024-02-16 PROCEDURE — 80069 RENAL FUNCTION PANEL: CPT | Performed by: STUDENT IN AN ORGANIZED HEALTH CARE EDUCATION/TRAINING PROGRAM

## 2024-02-16 PROCEDURE — 2500000004 HC RX 250 GENERAL PHARMACY W/ HCPCS (ALT 636 FOR OP/ED): Performed by: PHYSICIAN ASSISTANT

## 2024-02-16 PROCEDURE — 2500000004 HC RX 250 GENERAL PHARMACY W/ HCPCS (ALT 636 FOR OP/ED)

## 2024-02-16 PROCEDURE — 71045 X-RAY EXAM CHEST 1 VIEW: CPT | Performed by: RADIOLOGY

## 2024-02-16 PROCEDURE — 70450 CT HEAD/BRAIN W/O DYE: CPT

## 2024-02-16 PROCEDURE — 2500000001 HC RX 250 WO HCPCS SELF ADMINISTERED DRUGS (ALT 637 FOR MEDICARE OP)

## 2024-02-16 PROCEDURE — 99291 CRITICAL CARE FIRST HOUR: CPT | Performed by: STUDENT IN AN ORGANIZED HEALTH CARE EDUCATION/TRAINING PROGRAM

## 2024-02-16 PROCEDURE — 2500000001 HC RX 250 WO HCPCS SELF ADMINISTERED DRUGS (ALT 637 FOR MEDICARE OP): Performed by: NURSE PRACTITIONER

## 2024-02-16 PROCEDURE — 99232 SBSQ HOSP IP/OBS MODERATE 35: CPT | Performed by: STUDENT IN AN ORGANIZED HEALTH CARE EDUCATION/TRAINING PROGRAM

## 2024-02-16 PROCEDURE — 85610 PROTHROMBIN TIME: CPT | Performed by: STUDENT IN AN ORGANIZED HEALTH CARE EDUCATION/TRAINING PROGRAM

## 2024-02-16 PROCEDURE — 94668 MNPJ CHEST WALL SBSQ: CPT

## 2024-02-16 PROCEDURE — 36415 COLL VENOUS BLD VENIPUNCTURE: CPT | Performed by: STUDENT IN AN ORGANIZED HEALTH CARE EDUCATION/TRAINING PROGRAM

## 2024-02-16 PROCEDURE — 1200000002 HC GENERAL ROOM WITH TELEMETRY DAILY

## 2024-02-16 PROCEDURE — 71045 X-RAY EXAM CHEST 1 VIEW: CPT

## 2024-02-16 PROCEDURE — C9113 INJ PANTOPRAZOLE SODIUM, VIA: HCPCS

## 2024-02-16 RX ORDER — SODIUM CHLORIDE 9 MG/ML
75 INJECTION, SOLUTION INTRAVENOUS CONTINUOUS
Status: DISCONTINUED | OUTPATIENT
Start: 2024-02-16 | End: 2024-02-18

## 2024-02-16 RX ORDER — CHLORHEXIDINE GLUCONATE ORAL RINSE 1.2 MG/ML
15 SOLUTION DENTAL 2 TIMES DAILY
Status: DISCONTINUED | OUTPATIENT
Start: 2024-02-16 | End: 2024-02-22 | Stop reason: HOSPADM

## 2024-02-16 RX ORDER — METOPROLOL TARTRATE 25 MG/1
25 TABLET, FILM COATED ORAL 2 TIMES DAILY
Status: DISCONTINUED | OUTPATIENT
Start: 2024-02-16 | End: 2024-02-20

## 2024-02-16 RX ORDER — LEVETIRACETAM 15 MG/ML
1500 INJECTION INTRAVASCULAR ONCE
Status: DISCONTINUED | OUTPATIENT
Start: 2024-02-16 | End: 2024-02-16

## 2024-02-16 RX ADMIN — ENOXAPARIN SODIUM 30 MG: 100 INJECTION SUBCUTANEOUS at 08:37

## 2024-02-16 RX ADMIN — SODIUM CHLORIDE 500 ML: 9 INJECTION, SOLUTION INTRAVENOUS at 17:40

## 2024-02-16 RX ADMIN — METOPROLOL TARTRATE 25 MG: 25 TABLET, FILM COATED ORAL at 20:16

## 2024-02-16 RX ADMIN — NYSTATIN 500000 UNITS: 100000 SUSPENSION ORAL at 20:16

## 2024-02-16 RX ADMIN — LEVETIRACETAM 1900 MG: 500 INJECTION, SOLUTION, CONCENTRATE INTRAVENOUS at 21:30

## 2024-02-16 RX ADMIN — SODIUM CHLORIDE 75 ML/HR: 9 INJECTION, SOLUTION INTRAVENOUS at 22:15

## 2024-02-16 RX ADMIN — INSULIN LISPRO 1 UNITS: 100 INJECTION, SOLUTION INTRAVENOUS; SUBCUTANEOUS at 00:24

## 2024-02-16 RX ADMIN — INSULIN LISPRO 2 UNITS: 100 INJECTION, SOLUTION INTRAVENOUS; SUBCUTANEOUS at 13:21

## 2024-02-16 RX ADMIN — ATORVASTATIN CALCIUM 80 MG: 80 TABLET, FILM COATED ORAL at 20:16

## 2024-02-16 RX ADMIN — PANTOPRAZOLE SODIUM 40 MG: 40 INJECTION, POWDER, FOR SOLUTION INTRAVENOUS at 08:38

## 2024-02-16 RX ADMIN — METOPROLOL TARTRATE 5 MG: 1 INJECTION, SOLUTION INTRAVENOUS at 01:49

## 2024-02-16 RX ADMIN — POLYETHYLENE GLYCOL 3350 17 G: 17 POWDER, FOR SOLUTION ORAL at 08:38

## 2024-02-16 RX ADMIN — METOPROLOL TARTRATE 5 MG: 1 INJECTION, SOLUTION INTRAVENOUS at 14:00

## 2024-02-16 RX ADMIN — INSULIN LISPRO 2 UNITS: 100 INJECTION, SOLUTION INTRAVENOUS; SUBCUTANEOUS at 17:00

## 2024-02-16 RX ADMIN — NYSTATIN 500000 UNITS: 100000 SUSPENSION ORAL at 07:00

## 2024-02-16 RX ADMIN — METOPROLOL TARTRATE 5 MG: 1 INJECTION, SOLUTION INTRAVENOUS at 08:36

## 2024-02-16 RX ADMIN — INSULIN LISPRO 1 UNITS: 100 INJECTION, SOLUTION INTRAVENOUS; SUBCUTANEOUS at 04:14

## 2024-02-16 RX ADMIN — INSULIN LISPRO 2 UNITS: 100 INJECTION, SOLUTION INTRAVENOUS; SUBCUTANEOUS at 08:38

## 2024-02-16 RX ADMIN — CHLORHEXIDINE GLUCONATE 15 ML: 1.2 SOLUTION ORAL at 20:16

## 2024-02-16 RX ADMIN — LEVETIRACETAM 500 MG: 5 INJECTION INTRAVENOUS at 08:37

## 2024-02-16 RX ADMIN — INSULIN LISPRO 2 UNITS: 100 INJECTION, SOLUTION INTRAVENOUS; SUBCUTANEOUS at 20:16

## 2024-02-16 RX ADMIN — NYSTATIN 500000 UNITS: 100000 SUSPENSION ORAL at 17:40

## 2024-02-16 RX ADMIN — NYSTATIN 500000 UNITS: 100000 SUSPENSION ORAL at 13:00

## 2024-02-16 RX ADMIN — LEVETIRACETAM 500 MG: 5 INJECTION INTRAVENOUS at 20:16

## 2024-02-16 ASSESSMENT — COGNITIVE AND FUNCTIONAL STATUS - GENERAL
MOVING FROM LYING ON BACK TO SITTING ON SIDE OF FLAT BED WITH BEDRAILS: A LOT
EATING MEALS: TOTAL
WALKING IN HOSPITAL ROOM: TOTAL
MOBILITY SCORE: 8
PERSONAL GROOMING: TOTAL
MOVING TO AND FROM BED TO CHAIR: TOTAL
TOILETING: TOTAL
MOVING FROM LYING ON BACK TO SITTING ON SIDE OF FLAT BED WITH BEDRAILS: A LOT
MOVING TO AND FROM BED TO CHAIR: TOTAL
DRESSING REGULAR LOWER BODY CLOTHING: TOTAL
DRESSING REGULAR LOWER BODY CLOTHING: TOTAL
MOVING TO AND FROM BED TO CHAIR: TOTAL
HELP NEEDED FOR BATHING: TOTAL
STANDING UP FROM CHAIR USING ARMS: TOTAL
CLIMB 3 TO 5 STEPS WITH RAILING: TOTAL
PERSONAL GROOMING: TOTAL
CLIMB 3 TO 5 STEPS WITH RAILING: TOTAL
EATING MEALS: TOTAL
MOVING FROM LYING ON BACK TO SITTING ON SIDE OF FLAT BED WITH BEDRAILS: A LOT
MOBILITY SCORE: 8
HELP NEEDED FOR BATHING: TOTAL
DAILY ACTIVITIY SCORE: 6
HELP NEEDED FOR BATHING: TOTAL
WALKING IN HOSPITAL ROOM: TOTAL
STANDING UP FROM CHAIR USING ARMS: TOTAL
STANDING UP FROM CHAIR USING ARMS: TOTAL
TOILETING: TOTAL
TURNING FROM BACK TO SIDE WHILE IN FLAT BAD: A LOT
CLIMB 3 TO 5 STEPS WITH RAILING: TOTAL
DRESSING REGULAR UPPER BODY CLOTHING: TOTAL
DRESSING REGULAR UPPER BODY CLOTHING: TOTAL
TURNING FROM BACK TO SIDE WHILE IN FLAT BAD: A LOT
DAILY ACTIVITIY SCORE: 6
EATING MEALS: TOTAL
TURNING FROM BACK TO SIDE WHILE IN FLAT BAD: A LOT
PERSONAL GROOMING: TOTAL
WALKING IN HOSPITAL ROOM: TOTAL
DRESSING REGULAR LOWER BODY CLOTHING: TOTAL
DRESSING REGULAR UPPER BODY CLOTHING: TOTAL
TOILETING: TOTAL

## 2024-02-16 ASSESSMENT — PAIN - FUNCTIONAL ASSESSMENT
PAIN_FUNCTIONAL_ASSESSMENT: CPOT (CRITICAL CARE PAIN OBSERVATION TOOL)
PAIN_FUNCTIONAL_ASSESSMENT: CPOT (CRITICAL CARE PAIN OBSERVATION TOOL)
PAIN_FUNCTIONAL_ASSESSMENT: 0-10
PAIN_FUNCTIONAL_ASSESSMENT: 0-10
PAIN_FUNCTIONAL_ASSESSMENT: CPOT (CRITICAL CARE PAIN OBSERVATION TOOL)
PAIN_FUNCTIONAL_ASSESSMENT: 0-10

## 2024-02-16 ASSESSMENT — PAIN SCALES - GENERAL
PAINLEVEL_OUTOF10: 0 - NO PAIN

## 2024-02-16 NOTE — PROGRESS NOTES
Physical Therapy                 Therapy Communication Note    Patient Name: Johnnie Rudolph  MRN: [id number]  Today's Date: 2/16/2024     Discipline: Physical Therapy    Missed Visit Reason: Missed Visit Reason: Patient placed on medical hold (Pt with change in exam, and awaiting results of CT.)    Missed Time: Attempt    Comment:

## 2024-02-16 NOTE — PROGRESS NOTES
[facility]  TRAUMA ICU - PROGRESS NOTE    Patient Name: Johnnie Rudolph  MRN: [id number]  Admit Date: 211  : 1940  AGE: 83 y.o.   GENDER: male  =================================================================  MECHANISM OF INJURY:   Fall down steps  LOC (yes/no?): Unknown  Anticoagulant / Anti-platelet Rx? (for what dx?): xarelto, AF and ischemic stroke hx (last dose am )  Referring Facility Name (N/A for scene EMR run): [facility]     INJURIES:   Trace acute SAH, left frontal lobe IPH, small SDH    OTHER MEDICAL PROBLEMS:  Ischemic stroke (10/23)  HTN  A fib   DM II  Glaucoma     INCIDENTAL FINDINGS:  None     PROCEDURES:    =================================================================    TODAY'S ASSESSMENT AND PLAN OF CARE:  Johnnie Rudolph is a 83 y.o. male in the ICU due to: ICH and Q1 hour neuro checks. Patient transferred to NSU and continues to be followed primarily by TSICU team. More difficult to waken this AM.     NEURO/PAIN/SEDATION:   - ICH, SAH, SDH  - Baseline confusion  - Seroq Uel discontinued   - Discontinued Haldol due to sedative effects  - CT head repeat with worsening bleed, given andexa given xarelto usage, last dose  am  - Repeat CT Head  showed worsening bleed, now stable  - Repeat head CT  for worsening mental status   - Q4 hour neuro checks     RESPIRATORY:   - Respiratory hygiene ordered   - Frequent suctioning   - Chest xray showing no consolidations, effusions  - Maintain SpO2 > 92%    CARDIOVASC:   - HTN - resume Lisinopril/hydrochlorothiazide when able  - PRN labetalol for SBPs < 160  - Home atorvastatin dose restarted  - Holding Xarelto until 2 weeks post fall and follow up with NSGY/Cardiology  - Cardiology to follow outpatient for long-term Xarelto planning  - Continuous telemetry    GI:   - Dobhoff placed  - Glucerna 1.5 TF - goal of 60 ml/hr per nutrition recs  -  ml Q6HR in setting of hyponatremia  - Sodium 132  this AM    :   -  ccs  - Replete electrolytes as needed  - Penile irritation from external cath, cleaned and replaced external cath    HEMATOLOGIC:   - Holding home xarelto   - Daily CBC     ENDOCRINE:   - DMII  - SSI while inpatient, takes metformin/glipizide at home   - BG range 165-231    MUSCULOSKELETAL/SKIN:   - No acute issues   - PT/OT     INFECTIOUS DISEASE:   - Leukocytosis to 15.7  - Afebrile  - Continue to monitor    GI PROPHYLAXIS: Protonix 40 mg IV     DVT PROPHYLAXIS: SCDs BLE, Lovenox 30 mg BID    DISPOSITION: TSICU    Patient seen and discussed with attending, Dr. Fuentes   ==============================================================================  CHIEF COMPLAINT / OVERNIGHT EVENTS / HPI:   EEG completed, no agitation overnight. Continues to decline to eat. Code status updated to DNR CCA after family discussion. Girlfriend is POA and paperwork in paper chart.     MEDICAL HISTORY / ROS:  Admission history and ROS reviewed. Pertinent changes as follows:      PHYSICAL EXAM:  Heart Rate:  []   Temp:  [36 °C (96.8 °F)-37.1 °C (98.8 °F)]   Resp:  [20-28]   BP: (113-181)/()   SpO2:  [91 %-97 %]     General: NAD, sleeping, difficult to rouse  HEENT: Normocephalic, atraumatic  Neuro: motor and strength intact, patient does not answer A&O questions  Cardio: Rate controlled a fib  Resp: Unlabored breathing on 2LNC, significant secretions  Abdomen: Soft, nontender, nondistended  : External catheter in place draining dark yellow urine, erythema of scrotum   MSK: BAUM, normal ROM. Strength 5/5  Extremities: Normal extremities, no edema or cyanosis  Skin: Warm and dry, no new lesions or rashes  Psych: Appropriate mood and behavior    IMAGING SUMMARY:  (summary of new imaging findings, not a copy of dictation)  Reviewed     LABS:  Results from last 7 days   Lab Units 02/16/24  0154 02/15/24  0112 02/14/24  0229 02/12/24  0107 02/11/24  1815 02/11/24  1316   WBC AUTO x10*3/uL 15.4* 13.2*  11.9*   < > 12.9* 7.5   HEMOGLOBIN g/dL 12.0* 12.7* 12.0*   < > 13.6 13.0*   HEMATOCRIT % 33.0* 35.3* 34.3*   < > 37.0* 37.2*   PLATELETS AUTO x10*3/uL 189 185 153   < > 208 201   NEUTROS PCT AUTO %  --   --   --   --  79.4 61.0   LYMPHS PCT AUTO %  --   --   --   --  12.1 25.7   MONOS PCT AUTO %  --   --   --   --  7.4 10.5   EOS PCT AUTO %  --   --   --   --  0.3 1.3    < > = values in this interval not displayed.       Results from last 7 days   Lab Units 02/16/24  0154 02/15/24  0112 02/14/24  0229   APTT seconds 31 31 28   INR  1.4* 1.4* 1.4*       Results from last 7 days   Lab Units 02/16/24  0154 02/15/24  0112 02/14/24  0229 02/12/24  0107 02/11/24  1815 02/11/24  1316   SODIUM mmol/L 132* 128* 134*   < > 142 137   POTASSIUM mmol/L 4.2 4.6 3.1*   < > 3.6 3.7   CHLORIDE mmol/L 100 99 106   < > 106 101   CO2 mmol/L 22 22 21   < > 26 27   BUN mg/dL 22 15 12   < > 12 13   CREATININE mg/dL 0.64 0.51 0.51   < > 0.69 0.83   CALCIUM mg/dL 8.1* 7.6* 6.7*   < > 9.5 9.3   PROTEIN TOTAL g/dL  --   --   --   --  6.2* 7.1   BILIRUBIN TOTAL mg/dL  --   --   --   --  0.9 1.0   ALK PHOS U/L  --   --   --   --  81 92   ALT U/L  --   --   --   --  27 31   AST U/L  --   --   --   --  34 29   GLUCOSE mg/dL 195* 181* 116*   < > 86 197*    < > = values in this interval not displayed.       Results from last 7 days   Lab Units 02/11/24 1815 02/11/24  1316   BILIRUBIN TOTAL mg/dL 0.9 1.0           I have reviewed all medications, laboratory results, and imaging pertinent for today's encounter.

## 2024-02-16 NOTE — CARE PLAN
Problem: Pain  Goal: My pain/discomfort is manageable  Outcome: Progressing     Problem: Safety  Goal: Patient will be injury free during hospitalization  Outcome: Progressing  Goal: I will remain free of falls  Outcome: Progressing     Problem: Daily Care  Goal: Daily care needs are met  Outcome: Progressing     Problem: Psychosocial Needs  Goal: Demonstrates ability to cope with hospitalization/illness  Outcome: Progressing  Goal: Collaborate with me, my family, and caregiver to identify my specific goals  Outcome: Progressing     Problem: Discharge Barriers  Goal: My discharge needs are met  Outcome: Progressing     Problem: Skin  Goal: Decreased wound size/increased tissue granulation at next dressing change  Outcome: Progressing  Goal: Participates in plan/prevention/treatment measures  Outcome: Progressing  Goal: Prevent/manage excess moisture  Outcome: Progressing  Goal: Prevent/minimize sheer/friction injuries  Outcome: Progressing  Goal: Promote/optimize nutrition  Outcome: Progressing  Goal: Promote skin healing  Outcome: Progressing     Problem: Fall/Injury  Goal: Not fall by end of shift  Outcome: Progressing  Goal: Be free from injury by end of the shift  Outcome: Progressing  Goal: Verbalize understanding of personal risk factors for fall in the hospital  Outcome: Progressing  Goal: Verbalize understanding of risk factor reduction measures to prevent injury from fall in the home  Outcome: Progressing  Goal: Use assistive devices by end of the shift  Outcome: Progressing  Goal: Pace activities to prevent fatigue by end of the shift  Outcome: Progressing     Problem: Safety - Medical Restraint  Goal: Remains free of injury from restraints (Restraint for Interference with Medical Device)  Outcome: Progressing  Goal: Free from restraint(s) (Restraint for Interference with Medical Device)  Outcome: Progressing   The patient's goals for the shift include      The clinical goals for the shift include pt  will remain safe throughout shift, HR <100

## 2024-02-16 NOTE — CARE PLAN
Problem: Safety  Goal: Patient will be injury free during hospitalization  Outcome: Progressing  Goal: I will remain free of falls  Outcome: Progressing     Problem: Skin  Goal: Prevent/minimize sheer/friction injuries  Outcome: Progressing  Goal: Promote/optimize nutrition  Outcome: Progressing  Goal: Promote skin healing  Outcome: Progressing     Problem: Safety - Medical Restraint  Goal: Remains free of injury from restraints (Restraint for Interference with Medical Device)  Outcome: Progressing

## 2024-02-16 NOTE — PROGRESS NOTES
[facility]  TRAUMA - PROGRESS NOTE    Patient Name: Johnnie Rudolph  MRN: [id number]  Admit Date: 211  : 1940  AGE: 83 y.o.   GENDER: male  =================================================================  MECHANISM OF INJURY:   Fall down steps  LOC (yes/no?): Unknown  Anticoagulant / Anti-platelet Rx? (for what dx?): xarelto, AF and ischemic stroke hx (last dose am )  Referring Facility Name (N/A for scene EMR run): [facility]     INJURIES:   Trace acute SAH, left frontal lobe     OTHER MEDICAL PROBLEMS:  Ischemic stroke (10/23)  HTN  A fib   DM II  Glaucoma     INCIDENTAL FINDINGS:  None     PROCEDURES:    =================================================================    TODAY'S ASSESSMENT AND PLAN OF CARE:  Johnnie Rudolph is a 83 y.o. male in the ICU due to: ICH and Q1 hour neuro checks. CT head repeat with worsening bleed, given andexa given xarelto usage, last dose  am.    -NSGY following - appreciate recs  - Continue to hold xarelto  -Continue q4hr neuro checks  -Continue Tfs  -Provide oral hygiene, needs NT suctioning   -Continue ICU level of care     Patient seen and discussed with Attending, Dr. Buenrostro.    Cade Stoll MD  General Surgery Resident  Trauma Surgery    ==============================================================================  CHIEF COMPLAINT / OVERNIGHT EVENTS / HPI:   NAEO    MEDICAL HISTORY / ROS:  Admission history and ROS reviewed. Pertinent changes as follows:      PHYSICAL EXAM:  Heart Rate:  []   Temp:  [36 °C (96.8 °F)-37.1 °C (98.8 °F)]   Resp:  [20-28]   BP: (113-181)/()   SpO2:  [91 %-97 %]     General: NAD, awake and alert, conversive  HEENT: Normocephalic, atraumatic  Neuro: GCS 10 E2V2M6  Cardio: Bradycardic episodes, asymptomatic  Resp: Unlabored breathing on RA  Abdomen: Soft, nontender, nondistended  : External catheter in place draining clear yellow urine  MSK: BAUM, normal ROM. Strength  5/5  Extremities: Normal extremities, no edema or cyanosis  Skin: Warm and dry,  no new lesions or rashes  Psych: Appropriate mood and behavior    IMAGING SUMMARY:  (summary of new imaging findings, not a copy of dictation)  Reviewed     LABS:  Results from last 7 days   Lab Units 02/16/24  0154 02/15/24  0112 02/14/24  0229 02/12/24  0107 02/11/24  1815 02/11/24  1316   WBC AUTO x10*3/uL 15.4* 13.2* 11.9*   < > 12.9* 7.5   HEMOGLOBIN g/dL 12.0* 12.7* 12.0*   < > 13.6 13.0*   HEMATOCRIT % 33.0* 35.3* 34.3*   < > 37.0* 37.2*   PLATELETS AUTO x10*3/uL 189 185 153   < > 208 201   NEUTROS PCT AUTO %  --   --   --   --  79.4 61.0   LYMPHS PCT AUTO %  --   --   --   --  12.1 25.7   MONOS PCT AUTO %  --   --   --   --  7.4 10.5   EOS PCT AUTO %  --   --   --   --  0.3 1.3    < > = values in this interval not displayed.       Results from last 7 days   Lab Units 02/16/24  0154 02/15/24  0112 02/14/24  0229   APTT seconds 31 31 28   INR  1.4* 1.4* 1.4*       Results from last 7 days   Lab Units 02/16/24  0154 02/15/24  0112 02/14/24  0229 02/12/24  0107 02/11/24  1815 02/11/24  1316   SODIUM mmol/L 132* 128* 134*   < > 142 137   POTASSIUM mmol/L 4.2 4.6 3.1*   < > 3.6 3.7   CHLORIDE mmol/L 100 99 106   < > 106 101   CO2 mmol/L 22 22 21   < > 26 27   BUN mg/dL 22 15 12   < > 12 13   CREATININE mg/dL 0.64 0.51 0.51   < > 0.69 0.83   CALCIUM mg/dL 8.1* 7.6* 6.7*   < > 9.5 9.3   PROTEIN TOTAL g/dL  --   --   --   --  6.2* 7.1   BILIRUBIN TOTAL mg/dL  --   --   --   --  0.9 1.0   ALK PHOS U/L  --   --   --   --  81 92   ALT U/L  --   --   --   --  27 31   AST U/L  --   --   --   --  34 29   GLUCOSE mg/dL 195* 181* 116*   < > 86 197*    < > = values in this interval not displayed.       Results from last 7 days   Lab Units 02/11/24  1815 02/11/24  1316   BILIRUBIN TOTAL mg/dL 0.9 1.0           I have reviewed all medications, laboratory results, and imaging pertinent for today's encounter.

## 2024-02-16 NOTE — PROGRESS NOTES
Social Work Discharge Planning note:    -SW discussed Pt with the primary medical team  -Plan per medical team: Pt is not medically ready for discharge.   -Payer: [employer]  -Status: Inpatient  -Discharge disposition: Pt was initially with PT recommendations for acute rehab level of care, but he may need re-eval, as he has been more lethargic. ZHANE met with Pt's significant other/medical POA (copy in Epic), Angela, to offer support and to further discuss discharge planning. If Pt is able to go to acute rehab level of care, Angela is interested in [facility]. SW gave Angela a printed list of SNF's and she gave the following choices, listed in order of preference: [facility], [facility], [facility], and then [facility]. Referrals to [facility] and the SNF's have been sent via [facility]. SW will continue to follow to assist with discharge planning.   -Support to Pt/family: Angela reported there to be no other issues or concerns at this time. SW will continue to follow for emotional/incidental support to Pt/family.    -Potential Barriers: none  -Anticipated Date of Discharge:  2/21/2024    FRITZ Walton, LSW

## 2024-02-17 ENCOUNTER — APPOINTMENT (OUTPATIENT)
Dept: CARDIOLOGY | Facility: HOSPITAL | Age: 84
DRG: 082 | End: 2024-02-17
Payer: MEDICARE

## 2024-02-17 ENCOUNTER — APPOINTMENT (OUTPATIENT)
Dept: RADIOLOGY | Facility: HOSPITAL | Age: 84
DRG: 082 | End: 2024-02-17
Payer: MEDICARE

## 2024-02-17 ENCOUNTER — APPOINTMENT (OUTPATIENT)
Dept: NEUROLOGY | Facility: HOSPITAL | Age: 84
DRG: 082 | End: 2024-02-17
Payer: MEDICARE

## 2024-02-17 LAB
ALBUMIN SERPL BCP-MCNC: 3 G/DL (ref 3.4–5)
ANION GAP SERPL CALC-SCNC: 12 MMOL/L (ref 10–20)
APTT PPP: 30 SECONDS (ref 27–38)
BUN SERPL-MCNC: 21 MG/DL (ref 6–23)
CA-I BLD-SCNC: 1.12 MMOL/L (ref 1.1–1.33)
CALCIUM SERPL-MCNC: 8.5 MG/DL (ref 8.6–10.6)
CHLORIDE SERPL-SCNC: 101 MMOL/L (ref 98–107)
CO2 SERPL-SCNC: 27 MMOL/L (ref 21–32)
CREAT SERPL-MCNC: 0.7 MG/DL (ref 0.5–1.3)
EGFRCR SERPLBLD CKD-EPI 2021: >90 ML/MIN/1.73M*2
ERYTHROCYTE [DISTWIDTH] IN BLOOD BY AUTOMATED COUNT: 13 % (ref 11.5–14.5)
GLUCOSE BLD MANUAL STRIP-MCNC: 162 MG/DL (ref 74–99)
GLUCOSE BLD MANUAL STRIP-MCNC: 173 MG/DL (ref 74–99)
GLUCOSE BLD MANUAL STRIP-MCNC: 175 MG/DL (ref 74–99)
GLUCOSE BLD MANUAL STRIP-MCNC: 176 MG/DL (ref 74–99)
GLUCOSE BLD MANUAL STRIP-MCNC: 198 MG/DL (ref 74–99)
GLUCOSE SERPL-MCNC: 195 MG/DL (ref 74–99)
HCT VFR BLD AUTO: 33 % (ref 41–52)
HGB BLD-MCNC: 11.7 G/DL (ref 13.5–17.5)
INR PPP: 1.4 (ref 0.9–1.1)
LEVETIRACETAM SERPL-MCNC: 27 UG/ML (ref 10–40)
MAGNESIUM SERPL-MCNC: 1.95 MG/DL (ref 1.6–2.4)
MCH RBC QN AUTO: 32.1 PG (ref 26–34)
MCHC RBC AUTO-ENTMCNC: 35.5 G/DL (ref 32–36)
MCV RBC AUTO: 90 FL (ref 80–100)
NRBC BLD-RTO: 0 /100 WBCS (ref 0–0)
PHOSPHATE SERPL-MCNC: 3.1 MG/DL (ref 2.5–4.9)
PLATELET # BLD AUTO: 198 X10*3/UL (ref 150–450)
POTASSIUM SERPL-SCNC: 4.2 MMOL/L (ref 3.5–5.3)
PROTHROMBIN TIME: 15.3 SECONDS (ref 9.8–12.8)
RBC # BLD AUTO: 3.65 X10*6/UL (ref 4.5–5.9)
SODIUM SERPL-SCNC: 136 MMOL/L (ref 136–145)
WBC # BLD AUTO: 14.2 X10*3/UL (ref 4.4–11.3)

## 2024-02-17 PROCEDURE — 94668 MNPJ CHEST WALL SBSQ: CPT

## 2024-02-17 PROCEDURE — 36415 COLL VENOUS BLD VENIPUNCTURE: CPT | Performed by: STUDENT IN AN ORGANIZED HEALTH CARE EDUCATION/TRAINING PROGRAM

## 2024-02-17 PROCEDURE — 2500000002 HC RX 250 W HCPCS SELF ADMINISTERED DRUGS (ALT 637 FOR MEDICARE OP, ALT 636 FOR OP/ED)

## 2024-02-17 PROCEDURE — 83735 ASSAY OF MAGNESIUM: CPT | Performed by: STUDENT IN AN ORGANIZED HEALTH CARE EDUCATION/TRAINING PROGRAM

## 2024-02-17 PROCEDURE — 95700 EEG CONT REC W/VID EEG TECH: CPT

## 2024-02-17 PROCEDURE — 31720 CLEARANCE OF AIRWAYS: CPT

## 2024-02-17 PROCEDURE — C9113 INJ PANTOPRAZOLE SODIUM, VIA: HCPCS

## 2024-02-17 PROCEDURE — 99232 SBSQ HOSP IP/OBS MODERATE 35: CPT | Performed by: STUDENT IN AN ORGANIZED HEALTH CARE EDUCATION/TRAINING PROGRAM

## 2024-02-17 PROCEDURE — 2500000004 HC RX 250 GENERAL PHARMACY W/ HCPCS (ALT 636 FOR OP/ED)

## 2024-02-17 PROCEDURE — 71045 X-RAY EXAM CHEST 1 VIEW: CPT | Performed by: RADIOLOGY

## 2024-02-17 PROCEDURE — 2500000001 HC RX 250 WO HCPCS SELF ADMINISTERED DRUGS (ALT 637 FOR MEDICARE OP)

## 2024-02-17 PROCEDURE — 85027 COMPLETE CBC AUTOMATED: CPT | Performed by: STUDENT IN AN ORGANIZED HEALTH CARE EDUCATION/TRAINING PROGRAM

## 2024-02-17 PROCEDURE — 95716 VEEG EA 12-26HR CONT MNTR: CPT

## 2024-02-17 PROCEDURE — 95720 EEG PHY/QHP EA INCR W/VEEG: CPT | Performed by: PSYCHIATRY & NEUROLOGY

## 2024-02-17 PROCEDURE — 82330 ASSAY OF CALCIUM: CPT | Performed by: STUDENT IN AN ORGANIZED HEALTH CARE EDUCATION/TRAINING PROGRAM

## 2024-02-17 PROCEDURE — 2500000001 HC RX 250 WO HCPCS SELF ADMINISTERED DRUGS (ALT 637 FOR MEDICARE OP): Performed by: STUDENT IN AN ORGANIZED HEALTH CARE EDUCATION/TRAINING PROGRAM

## 2024-02-17 PROCEDURE — 94640 AIRWAY INHALATION TREATMENT: CPT

## 2024-02-17 PROCEDURE — 70450 CT HEAD/BRAIN W/O DYE: CPT

## 2024-02-17 PROCEDURE — 82947 ASSAY GLUCOSE BLOOD QUANT: CPT

## 2024-02-17 PROCEDURE — 1200000002 HC GENERAL ROOM WITH TELEMETRY DAILY

## 2024-02-17 PROCEDURE — 80177 DRUG SCRN QUAN LEVETIRACETAM: CPT | Performed by: NURSE PRACTITIONER

## 2024-02-17 PROCEDURE — 80069 RENAL FUNCTION PANEL: CPT | Performed by: STUDENT IN AN ORGANIZED HEALTH CARE EDUCATION/TRAINING PROGRAM

## 2024-02-17 PROCEDURE — 2500000004 HC RX 250 GENERAL PHARMACY W/ HCPCS (ALT 636 FOR OP/ED): Performed by: NURSE PRACTITIONER

## 2024-02-17 PROCEDURE — 99291 CRITICAL CARE FIRST HOUR: CPT | Performed by: STUDENT IN AN ORGANIZED HEALTH CARE EDUCATION/TRAINING PROGRAM

## 2024-02-17 PROCEDURE — 2500000004 HC RX 250 GENERAL PHARMACY W/ HCPCS (ALT 636 FOR OP/ED): Performed by: STUDENT IN AN ORGANIZED HEALTH CARE EDUCATION/TRAINING PROGRAM

## 2024-02-17 PROCEDURE — 2500000001 HC RX 250 WO HCPCS SELF ADMINISTERED DRUGS (ALT 637 FOR MEDICARE OP): Performed by: NURSE PRACTITIONER

## 2024-02-17 PROCEDURE — 70450 CT HEAD/BRAIN W/O DYE: CPT | Performed by: RADIOLOGY

## 2024-02-17 PROCEDURE — 93010 ELECTROCARDIOGRAM REPORT: CPT | Performed by: INTERNAL MEDICINE

## 2024-02-17 PROCEDURE — 85610 PROTHROMBIN TIME: CPT | Performed by: STUDENT IN AN ORGANIZED HEALTH CARE EDUCATION/TRAINING PROGRAM

## 2024-02-17 PROCEDURE — 93005 ELECTROCARDIOGRAM TRACING: CPT

## 2024-02-17 PROCEDURE — 71045 X-RAY EXAM CHEST 1 VIEW: CPT

## 2024-02-17 RX ORDER — LEVETIRACETAM 10 MG/ML
1000 INJECTION INTRAVASCULAR EVERY 12 HOURS
Status: DISCONTINUED | OUTPATIENT
Start: 2024-02-17 | End: 2024-02-17

## 2024-02-17 RX ORDER — IPRATROPIUM BROMIDE AND ALBUTEROL SULFATE 2.5; .5 MG/3ML; MG/3ML
3 SOLUTION RESPIRATORY (INHALATION)
Status: DISCONTINUED | OUTPATIENT
Start: 2024-02-17 | End: 2024-02-20

## 2024-02-17 RX ORDER — LORAZEPAM 2 MG/ML
INJECTION INTRAMUSCULAR
Status: COMPLETED
Start: 2024-02-17 | End: 2024-02-17

## 2024-02-17 RX ORDER — LEVETIRACETAM 10 MG/ML
1000 INJECTION INTRAVASCULAR ONCE
Status: DISCONTINUED | OUTPATIENT
Start: 2024-02-17 | End: 2024-02-17

## 2024-02-17 RX ORDER — LORAZEPAM 2 MG/ML
2 INJECTION INTRAMUSCULAR ONCE
Status: COMPLETED | OUTPATIENT
Start: 2024-02-17 | End: 2024-02-17

## 2024-02-17 RX ADMIN — ATORVASTATIN CALCIUM 80 MG: 80 TABLET, FILM COATED ORAL at 21:32

## 2024-02-17 RX ADMIN — NYSTATIN 500000 UNITS: 100000 SUSPENSION ORAL at 21:32

## 2024-02-17 RX ADMIN — INSULIN LISPRO 1 UNITS: 100 INJECTION, SOLUTION INTRAVENOUS; SUBCUTANEOUS at 19:27

## 2024-02-17 RX ADMIN — SODIUM CHLORIDE 75 ML/HR: 9 INJECTION, SOLUTION INTRAVENOUS at 16:10

## 2024-02-17 RX ADMIN — CHLORHEXIDINE GLUCONATE 15 ML: 1.2 SOLUTION ORAL at 08:53

## 2024-02-17 RX ADMIN — CHLORHEXIDINE GLUCONATE 15 ML: 1.2 SOLUTION ORAL at 21:32

## 2024-02-17 RX ADMIN — LORAZEPAM 2 MG: 2 INJECTION INTRAMUSCULAR at 05:59

## 2024-02-17 RX ADMIN — NYSTATIN 500000 UNITS: 100000 SUSPENSION ORAL at 18:37

## 2024-02-17 RX ADMIN — LEVETIRACETAM 2000 MG: 500 INJECTION, SOLUTION INTRAVENOUS at 22:45

## 2024-02-17 RX ADMIN — IPRATROPIUM BROMIDE AND ALBUTEROL SULFATE 3 ML: .5; 3 SOLUTION RESPIRATORY (INHALATION) at 16:28

## 2024-02-17 RX ADMIN — INSULIN LISPRO 1 UNITS: 100 INJECTION, SOLUTION INTRAVENOUS; SUBCUTANEOUS at 08:53

## 2024-02-17 RX ADMIN — INSULIN LISPRO 1 UNITS: 100 INJECTION, SOLUTION INTRAVENOUS; SUBCUTANEOUS at 18:37

## 2024-02-17 RX ADMIN — INSULIN LISPRO 1 UNITS: 100 INJECTION, SOLUTION INTRAVENOUS; SUBCUTANEOUS at 00:36

## 2024-02-17 RX ADMIN — METOPROLOL TARTRATE 25 MG: 25 TABLET, FILM COATED ORAL at 21:33

## 2024-02-17 RX ADMIN — INSULIN LISPRO 1 UNITS: 100 INJECTION, SOLUTION INTRAVENOUS; SUBCUTANEOUS at 04:09

## 2024-02-17 RX ADMIN — NYSTATIN 500000 UNITS: 100000 SUSPENSION ORAL at 12:00

## 2024-02-17 RX ADMIN — NYSTATIN 500000 UNITS: 100000 SUSPENSION ORAL at 08:48

## 2024-02-17 RX ADMIN — INSULIN LISPRO 1 UNITS: 100 INJECTION, SOLUTION INTRAVENOUS; SUBCUTANEOUS at 12:00

## 2024-02-17 RX ADMIN — PANTOPRAZOLE SODIUM 40 MG: 40 INJECTION, POWDER, FOR SOLUTION INTRAVENOUS at 08:48

## 2024-02-17 RX ADMIN — LEVETIRACETAM 1000 MG: 10 INJECTION INTRAVENOUS at 08:47

## 2024-02-17 RX ADMIN — METOPROLOL TARTRATE 25 MG: 25 TABLET, FILM COATED ORAL at 08:48

## 2024-02-17 RX ADMIN — LORAZEPAM 2 MG: 2 INJECTION INTRAMUSCULAR; INTRAVENOUS at 05:59

## 2024-02-17 RX ADMIN — IPRATROPIUM BROMIDE AND ALBUTEROL SULFATE 3 ML: .5; 3 SOLUTION RESPIRATORY (INHALATION) at 19:49

## 2024-02-17 ASSESSMENT — COGNITIVE AND FUNCTIONAL STATUS - GENERAL
TURNING FROM BACK TO SIDE WHILE IN FLAT BAD: A LOT
CLIMB 3 TO 5 STEPS WITH RAILING: TOTAL
STANDING UP FROM CHAIR USING ARMS: TOTAL
CLIMB 3 TO 5 STEPS WITH RAILING: TOTAL
DRESSING REGULAR LOWER BODY CLOTHING: TOTAL
DRESSING REGULAR UPPER BODY CLOTHING: TOTAL
WALKING IN HOSPITAL ROOM: TOTAL
HELP NEEDED FOR BATHING: TOTAL
PERSONAL GROOMING: TOTAL
DRESSING REGULAR LOWER BODY CLOTHING: TOTAL
EATING MEALS: TOTAL
WALKING IN HOSPITAL ROOM: TOTAL
STANDING UP FROM CHAIR USING ARMS: TOTAL
TOILETING: TOTAL
HELP NEEDED FOR BATHING: TOTAL
MOVING TO AND FROM BED TO CHAIR: TOTAL
WALKING IN HOSPITAL ROOM: TOTAL
MOVING TO AND FROM BED TO CHAIR: TOTAL
MOVING TO AND FROM BED TO CHAIR: TOTAL
DRESSING REGULAR LOWER BODY CLOTHING: TOTAL
TURNING FROM BACK TO SIDE WHILE IN FLAT BAD: A LOT
PERSONAL GROOMING: TOTAL
PERSONAL GROOMING: TOTAL
EATING MEALS: TOTAL
DRESSING REGULAR UPPER BODY CLOTHING: TOTAL
DRESSING REGULAR UPPER BODY CLOTHING: TOTAL
STANDING UP FROM CHAIR USING ARMS: TOTAL
TOILETING: TOTAL
MOVING TO AND FROM BED TO CHAIR: TOTAL
TURNING FROM BACK TO SIDE WHILE IN FLAT BAD: A LOT
PERSONAL GROOMING: TOTAL
MOVING FROM LYING ON BACK TO SITTING ON SIDE OF FLAT BED WITH BEDRAILS: A LOT
EATING MEALS: TOTAL
TOILETING: TOTAL
DRESSING REGULAR UPPER BODY CLOTHING: TOTAL
HELP NEEDED FOR BATHING: TOTAL
HELP NEEDED FOR BATHING: TOTAL
CLIMB 3 TO 5 STEPS WITH RAILING: TOTAL
CLIMB 3 TO 5 STEPS WITH RAILING: TOTAL
TOILETING: TOTAL
MOVING FROM LYING ON BACK TO SITTING ON SIDE OF FLAT BED WITH BEDRAILS: A LOT
STANDING UP FROM CHAIR USING ARMS: TOTAL
WALKING IN HOSPITAL ROOM: TOTAL
MOVING FROM LYING ON BACK TO SITTING ON SIDE OF FLAT BED WITH BEDRAILS: A LOT
TURNING FROM BACK TO SIDE WHILE IN FLAT BAD: A LOT
EATING MEALS: TOTAL
MOVING FROM LYING ON BACK TO SITTING ON SIDE OF FLAT BED WITH BEDRAILS: A LOT
DRESSING REGULAR LOWER BODY CLOTHING: TOTAL

## 2024-02-17 ASSESSMENT — PAIN - FUNCTIONAL ASSESSMENT
PAIN_FUNCTIONAL_ASSESSMENT: CPOT (CRITICAL CARE PAIN OBSERVATION TOOL)

## 2024-02-17 NOTE — CONSULTS
"Reason for Consult: Concern for Seizure    History of Present Illness:      Mr. Johnnie Rudolph is a 82 YO handedness unknown white male with PMH of HTN, DM2, atrial fibrillation (on Xarleto), and stroke who is currently admitted to trauma service for management of traumatic right frontal ICH. Epilepsy consulted for seizure like episode on 2/16/23.    Hospital Course to Date:    Patient fell down 3 steps on 2/11/23 and hit back of his head. Patient was initially brought to [facility] with confusion and CTH revealed trace traumatic SAH but later scans showed evolving L frontal ICH. Patient received reversal with Andexanet, and transferred to [facility] for further care.    Patient was started on Levetiracetam 500 mg BID for seizure prophylaxis on 2/11/23. Per chart review this was given until evening of 2/16 (last dose 8:31 PM).    Following patient's chart it appears he had been excessively somnolent on 2/14 into 2/16.     Patient was placed on cVEEG on 2/14/24 which showed background slowing with L hemispheric structural lesion.    CTH on 2/16/23 evening showed expansion of patient's R fontal ICH. NSGY re-engaged recommended repeat CTH in 6 hours.     Reviewing patient's blood pressures he is noted in his hospital course to have pressures ranging form 120s to 180s with bulk of trend in 140s.    Appears patient's exam per NSGY and trauma appears to consistent of preserved motor strength with patient \"saying words\" and Aox1- presumably own name.    On 2/16/23 while patient was receiving evening dose primary team resident as well as RN witnessed a paroxysmal event concerning for seizure. Patient appeared to have 30 second episode of rhythmic twitching of the R face in which his eyes deviated to the R and he became tachycardic. Episode was brief and did not require benzodiazepine.    Epilepsy service was paged at this time. Writer recommended obtaining AED levels, 20 mg/kg load of LEV, starting cVEEG, and increasing LEV " maintenance dose.    Patient noted to be somnolent on assessment and unable to give additional history.     Additional history per chart review:    Appears MRI brain in 10/23 revealed embolic shower in L MCA territory with MRA showing no significant intracranial/extracranial disease.     Relevant Labs:    RFP notable for hyponatremia 128 > 132  INR 1.4  WBC 11.9 > 13.2 > 15.4    Imaging:    CTH 2/11 (initial): Personally reviewed shows trace SAH in L temporal/frontal region    CTHs from 2/11 until 2/14: R frontal ICH expanding in size    CTH 2/16: R frontal ICH again increased from  prior study.    Review of Systems:     Unable to perform 2/2 to mental status     - All systems reviewed and negative except as stated above    Past Medical History:    Past Medical History:   Diagnosis Date    Body mass index (BMI) 29.0-29.9, adult 12/11/2020    Body mass index (BMI) of 29.0 to 29.9 in adult    Body mass index (BMI) 31.0-31.9, adult 04/26/2019    BMI 31.0-31.9,adult    Contact with and (suspected) exposure to potentially hazardous body fluids 04/02/2018    Exposure to blood    Disorder of male genital organs, unspecified 04/02/2018    Disorder of male genital organs    History of falling 01/16/2015    History of fall    Laceration without foreign body of unspecified part of head, initial encounter 05/15/2018    Laceration of head    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    Nontraumatic chronic subdural hemorrhage    Other cranial cerebrospinal fluid leak     Subdural hygroma    Other fecal abnormalities 05/15/2017    Occult blood positive stool    Other symptoms and signs involving cognitive functions and awareness 08/03/2015    Cognitive impairment    Pain in left shoulder 08/08/2016    Acute pain of left shoulder    Personal history of other diseases of the musculoskeletal system and connective tissue 11/04/2014    History of back pain    Postconcussional syndrome 08/03/2015    Post concussion  syndrome          Surgical History:    Past Surgical History:   Procedure Laterality Date    HERNIA REPAIR  06/02/2014    Hernia Repair    KNEE SURGERY  06/02/2014    Knee Surgery    MR HEAD ANGIO WO IV CONTRAST  10/13/2023    MR HEAD ANGIO WO IV CONTRAST 10/13/2023 [facility] MRI    MR NECK ANGIO WO IV CONTRAST  10/13/2023    MR NECK ANGIO WO IV CONTRAST 10/13/2023 [facility] MRI    OTHER SURGICAL HISTORY  03/25/2022    Inguinal hernia repair laparoscopic        Home Meds:    Atorvastatin 80 mg daily  Glipizide 5 mg BID  Lisinopril-hydrochlorothiazide 20-12.5 mg  Metformin 1000 mg BID  Metoprolol Tartrate 25 mg BID  Rivaroxaban 20 mg daily     Allergies:    No Known Allergies     Social History:   - Living situation: Lives at home with family  - Baseline function: Was well enough in 10/23 to ask to drive car  - Occupation: Unknown  - Tobacco use:  None noted per chart review  - Alcohol use: None noted per chart review  - Illicit drug use: None noted per chart review     Family History:   - Unknown    Physical Exam:  General: Dishevled elderly white male  Heart: Irregularly irregular rhythm, No MRG  Lungs: Rhoncourous breath sounds appear to be coming from upper airway  Abdomen: Flat, nontender to superficial palpation     Neurological Exam:  MENTAL STATUS:  Eyes open intermittently  Follow 1 step commands at midline intermittently (opens mouth)  Does not produce spontaneous speech    CRANIAL NERVES:  - II/III: PERRL  - II: BTT BL  - V/VII: Corneal intact BL  - VIII: VOR intact  - IX, X: Cough/gag intact  - XII: Tongue midline without atrophy or fasciculation    MOTOR: Paratonia in BL UE > LE    STRENGTH: Exam limited 2/2 to mental status; BL UE at least 4/5 and BL LE at least 3/5    REFLEXES: R L  Biceps  + +  Triceps  + +  Patellar  + +  Plantar  Mute Mute    SENSORY: WD to nox stim in BL UE and LE    Assessment:    Mr. Johnnie Rudolph is a 82 YO handedness unknown white male with PMH of HTN, DM2, atrial fibrillation (on Xarleto),  and stroke who is currently admitted to trauma service for management of traumatic right frontal ICH. Epilepsy consulted for seizure like episode on 2/16/23.    By semiology this was likely a seizure originating in the R hemisphere involving the motor cortex and frontal eye fields without further generalization. Regarding etiology this can be explained in the setting of expanding intercerebral hemorrhage. Regarding the etiology of the expanding hemorrhage we do wonder if this could be explained by the patient's slightly elevated INR and intermittent hypertension however we will differ this commentary and workup to NSGY and trauma teams.    4D Classification of the Paroxysmal Episodes:  EPE  Semiology: R Face clonic > R version  Frequency: Once  History of Status Epilepticus: NA  EZ: L Frontal Lobe  Etiology: L F ICH   Co-morbidities: L MCA stroke, DM2    Recommendations:    - Agree with starting cVEEG monitoring    - Patient now s/p 20 mg/kg load of IV LEV    - Will follow up LEV level (appears to have been sent pre-loading dose but directly after evening dose)    - Increase maintenance LEV dose to 750 mg BID    - Ativan 2 mg IV for prolonged motor seizure lasting more than 3 minutes or a cluster of 3 or more motor seizures in an 8 hour period.    - Agree with repeat CTH in 6 hours from prior scan; appreciate NSGY input    Shania Kim MD  PGY-3 Neurology  Epilepsy [id number]  EMU [id number]      I saw and evaluated the patient. I personally obtained the key and critical portions of the history and physical exam or was physically present for key and critical portions performed by the resident/fellow. I reviewed the resident/fellow's documentation and discussed the patient with the resident/fellow. I agree with the resident/fellow's medical decision making as documented in the note.    V EEG with one prolonged EEG  in the morning arising from the left frontal region.   Will continue Keppra 1000 mg bid  MRI pending once  stable.       I spent 50 minutes in the professional and overall care of this patient.    Claudette Anne MD

## 2024-02-17 NOTE — SIGNIFICANT EVENT
"Preliminary EEG Report     This vEEG is indicative of a left hemispheric structural lesion, with status epilepticus arising from the same region. One extended seizure (status) was seen beginning at 04:53 on 2/17/2024, eventually evolving to what appears as right versive > right head tonic seizure activity. Status broke on 2/17/2024 at 05:53:09. There was one additional seizure at 10:09:47 lasting ~3min, with no clear clinical activity.    This EEG was read from 0233h to 1343h on 2/17/2024. The final impression will be available tomorrow under Chart Review in the Media tab. To discontinue video EEG, place \"Discontinue Continuous VEEG\" order.      Baljeet Morfin MD    [facility]      "

## 2024-02-17 NOTE — CARE PLAN
Problem: Pain  Goal: My pain/discomfort is manageable  Outcome: Progressing     Problem: Safety  Goal: Patient will be injury free during hospitalization  Outcome: Progressing  Goal: I will remain free of falls  Outcome: Progressing     Problem: Daily Care  Goal: Daily care needs are met  Outcome: Progressing     Problem: Psychosocial Needs  Goal: Demonstrates ability to cope with hospitalization/illness  Outcome: Progressing  Goal: Collaborate with me, my family, and caregiver to identify my specific goals  Outcome: Progressing     Problem: Discharge Barriers  Goal: My discharge needs are met  Outcome: Progressing     Problem: Skin  Goal: Decreased wound size/increased tissue granulation at next dressing change  Outcome: Progressing  Goal: Participates in plan/prevention/treatment measures  Outcome: Progressing  Goal: Prevent/manage excess moisture  Outcome: Progressing  Goal: Prevent/minimize sheer/friction injuries  Outcome: Progressing  Goal: Promote/optimize nutrition  Outcome: Progressing  Goal: Promote skin healing  Outcome: Progressing     Problem: Fall/Injury  Goal: Not fall by end of shift  Outcome: Progressing  Goal: Be free from injury by end of the shift  Outcome: Progressing  Goal: Verbalize understanding of personal risk factors for fall in the hospital  Outcome: Progressing  Goal: Verbalize understanding of risk factor reduction measures to prevent injury from fall in the home  Outcome: Progressing  Goal: Use assistive devices by end of the shift  Outcome: Progressing  Goal: Pace activities to prevent fatigue by end of the shift  Outcome: Progressing     Problem: Safety - Medical Restraint  Goal: Remains free of injury from restraints (Restraint for Interference with Medical Device)  Outcome: Progressing  Goal: Free from restraint(s) (Restraint for Interference with Medical Device)  Outcome: Progressing   The patient's goals for the shift include      The clinical goals for the shift include no  falls, hr<100

## 2024-02-17 NOTE — PROGRESS NOTES
Nutrition/ Diabetes MNT Follow-up Progress Report    Rahel Tavarez was seen from 9:15 to 10:15 AM for diabetes self-management training, including nutrition and diabetes education in an individual setting for a diagnosis of Type 2 diabetes mellitus with complication, with long-term current use of insulin [E11.8, Z79.4]      Barriers and Readiness to Learning:  The instruction was given to the patient/Caregiver/ present: none  Barriers to self-care and learning limitations:physical limitations and financial limitations      Preferences for Learning: visual/verbal and observation and repetition   Readiness to learn: The patient demonstrates the ability to understand and asks questions.    Assessment and Plan:  Learning needs were assessed and the patient requires education in diabetes disease process/treatment options, medical nutrition therapy, physical acitivity, blood glucose monitoring, diabetes medications, acute complications, chronic complications, diabetes psychosocial adjustment and strategies and goal setting.     Material was presented using verbal, written, demonstration and return demonstration.     Assessment:  Food and Nutrition Related Changes since last visit: none    Self-reported adherence score: fair    Physical Activity changes:   Hurt back and has difficulty walking    Anthropometric Measurements:  Estimated body mass index is 40.03 kg/(m^2) as calculated from the following:    Height as of 3/28/17: 5' 6\" (1.676 m).    Weight as of 3/28/17: 112.5 kg.    Biochemical Data, Medical Tests, and Procedures:  Hemoglobin A1C (%)   Date Value   03/06/2017 6.5 (H)   06/13/2016 6.0 (H)   01/05/2016 5.4   08/03/2015 6.9 (H)   04/28/2015 7.2 (H)      Glucose (mg/dL)   Date Value   03/14/2017 168 (H)   03/07/2017 181 (H)   03/06/2017 168 (H)     CHOLESTEROL (mg/dL)   Date Value   02/29/2016 144      HDL (mg/dL)   Date Value   02/29/2016 39 (L)    No components found for: LDL   TRIGLYCERIDE (mg/dL)  [facility]  TRAUMA - PROGRESS NOTE    Patient Name: Johnnie Rudolph  MRN: [id number]  Admit Date: 211  : 1940  AGE: 83 y.o.   GENDER: male  =================================================================  MECHANISM OF INJURY:   Fall down steps  LOC (yes/no?): Unknown  Anticoagulant / Anti-platelet Rx? (for what dx?): xarelto, AF and ischemic stroke hx (last dose am )  Referring Facility Name (N/A for scene EMR run): [facility]     INJURIES:   Trace acute SAH, left frontal lobe     OTHER MEDICAL PROBLEMS:  Ischemic stroke (10/23)  HTN  A fib   DM II  Glaucoma     INCIDENTAL FINDINGS:  None     PROCEDURES:    =================================================================    TODAY'S ASSESSMENT AND PLAN OF CARE:  Johnnie Rudolph is a 83 y.o. male in the ICU due to: ICH and Q1 hour neuro checks. CT head repeat with worsening bleed, given andexa given xarelto usage, last dose  am.    -NSGY following - appreciate recs  - Continue to hold xarelto  -Continue q4hr neuro checks  -Continue Tfs  -Provide oral hygiene, needs NT suctioning   -Neurology consulted for seizures - appreciate recs  -Will follow up CThead  -Continue ICU level of care     Patient seen and discussed with Attending, Dr. Buenrostro.    Cade Stoll MD  General Surgery Resident  Trauma Surgery    ==============================================================================  CHIEF COMPLAINT / OVERNIGHT EVENTS / HPI:   NAEO    MEDICAL HISTORY / ROS:  Admission history and ROS reviewed. Pertinent changes as follows:      PHYSICAL EXAM:  Heart Rate:  []   Temp:  [36.1 °C (97 °F)-37.4 °C (99.3 °F)]   Resp:  [18-30]   BP: (119-159)/(55-91)   SpO2:  [92 %-99 %]     General: NAD, awake and alert, conversive  HEENT: Normocephalic, atraumatic  Neuro: GCS 10 E2V2M6  Cardio: Bradycardic episodes, asymptomatic  Resp: Unlabored breathing on RA  Abdomen: Soft, nontender, nondistended  : External catheter in    Date Value   02/29/2016 125      CALCULATED LDL (mg/dL)   Date Value   02/29/2016 80    No components found for: MICROALBUMIN    Current medications, vitamins, herbals, and nonprescription medications:      Current Outpatient Prescriptions   Medication Sig Dispense Refill   • allopurinol (ZYLOPRIM) 300 MG tablet TAKE ONE TABLET BY MOUTH EVERY DAY 30 tablet 6   • levothyroxine (SYNTHROID, LEVOTHROID) 150 MCG tablet TAKE 1 TABLET BY MOUTH DAILY MONDAY THROUGH SATURDAY AND ON SUNDAYS TAKE 1 AND 1/2 TABLETS 34 tablet 2   • Morphine-Naltrexone (EMBEDA) 50-2 MG Cap CR Take 1 tablet by mouth every morning. Fill on or after 3.28.17 30 capsule 0   • oxyCODONE/APAP (PERCOCET)  MG per tablet Take 1 tablet by mouth 3 times daily as needed for Pain. Max 3 per day. Fill on or after 3.28.17 90 tablet 0   • alendronate (FOSAMAX) 70 MG tablet Take 1 tablet by mouth every 7 days. 12 tablet 4   • losartan (COZAAR) 100 MG tablet Take 1 tablet by mouth daily. 30 tablet 2   • losartan (COZAAR) 100 MG tablet Take 1 tablet by mouth daily. 30 tablet 2   • acetaminophen (TYLENOL) 500 MG tablet Take 500 mg by mouth every 6 hours as needed for Pain or Fever.     • chlorzoxazone (PARAFON FORTE) 500 MG tablet Take 500 mg by mouth 3 times daily as needed for Muscle spasms.     • Apremilast (OTEZLA) 30 MG Tab Take 30 mg by mouth 2 times daily.     • tamsulosin (FLOMAX) 0.4 MG Cap Take 1 capsule by mouth daily after a meal. 30 capsule 2   • busPIRone (BUSPAR) 10 MG tablet Take 1 tablet by mouth 2 times daily. 60 tablet 2   • insulin glulisine (APIDRA SOLOSTAR) 100 UNIT/ML pen-injector Inject into the skin 3 times daily before meals per sliding scale. Daily maximum = 45 units 15 mL 5   • buPROPion (WELLBUTRIN XL) 300 MG 24 hr tablet Take 1 tablet by mouth every morning. 30 tablet 6   • DULoxetine (CYMBALTA) 60 MG capsule Take 1 capsule by mouth 2 times daily. For anxiety 60 capsule 6   • zolpidem (AMBIEN) 5 MG tablet Take 1 tablet by mouth  place draining clear yellow urine  MSK: BAUM, normal ROM. Strength 5/5  Extremities: Normal extremities, no edema or cyanosis  Skin: Warm and dry,  no new lesions or rashes  Psych: Appropriate mood and behavior    IMAGING SUMMARY:  (summary of new imaging findings, not a copy of dictation)  Reviewed     LABS:  Results from last 7 days   Lab Units 02/17/24  0412 02/16/24  0154 02/15/24  0112 02/12/24  0107 02/11/24 1815 02/11/24  1316   WBC AUTO x10*3/uL 14.2* 15.4* 13.2*   < > 12.9* 7.5   HEMOGLOBIN g/dL 11.7* 12.0* 12.7*   < > 13.6 13.0*   HEMATOCRIT % 33.0* 33.0* 35.3*   < > 37.0* 37.2*   PLATELETS AUTO x10*3/uL 198 189 185   < > 208 201   NEUTROS PCT AUTO %  --   --   --   --  79.4 61.0   LYMPHS PCT AUTO %  --   --   --   --  12.1 25.7   MONOS PCT AUTO %  --   --   --   --  7.4 10.5   EOS PCT AUTO %  --   --   --   --  0.3 1.3    < > = values in this interval not displayed.       Results from last 7 days   Lab Units 02/17/24  0412 02/16/24  0154 02/15/24  0112   APTT seconds 30 31 31   INR  1.4* 1.4* 1.4*       Results from last 7 days   Lab Units 02/17/24  0412 02/16/24  0154 02/15/24  0112 02/12/24  0107 02/11/24 1815 02/11/24  1316   SODIUM mmol/L 136 132* 128*   < > 142 137   POTASSIUM mmol/L 4.2 4.2 4.6   < > 3.6 3.7   CHLORIDE mmol/L 101 100 99   < > 106 101   CO2 mmol/L 27 22 22   < > 26 27   BUN mg/dL 21 22 15   < > 12 13   CREATININE mg/dL 0.70 0.64 0.51   < > 0.69 0.83   CALCIUM mg/dL 8.5* 8.1* 7.6*   < > 9.5 9.3   PROTEIN TOTAL g/dL  --   --   --   --  6.2* 7.1   BILIRUBIN TOTAL mg/dL  --   --   --   --  0.9 1.0   ALK PHOS U/L  --   --   --   --  81 92   ALT U/L  --   --   --   --  27 31   AST U/L  --   --   --   --  34 29   GLUCOSE mg/dL 195* 195* 181*   < > 86 197*    < > = values in this interval not displayed.       Results from last 7 days   Lab Units 02/11/24  1815 02/11/24  1316   BILIRUBIN TOTAL mg/dL 0.9 1.0       Results from last 7 days   Lab Units 02/16/24  1311   POCT PH, ARTERIAL pH  7.46*   POCT PCO2, ARTERIAL mm Hg 35*   POCT PO2, ARTERIAL mm Hg 67*   POCT HCO3 CALCULATED, ARTERIAL mmol/L 24.9   POCT BASE EXCESS, ARTERIAL mmol/L 1.4     I have reviewed all medications, laboratory results, and imaging pertinent for today's encounter.     nightly as needed for Sleep. 30 tablet 5   • aspirin 81 MG EC tablet TAKE ONE TABLET BY MOUTH EVERY EVENING 90 tablet 3   • omeprazole (PRILOSEC) 40 MG capsule Take 1 capsule by mouth daily. 30 capsule 2   • pregabalin (LYRICA) 225 MG capsule Take 1 capsule by mouth nightly. 30 capsule 2   • fluticasone (FLONASE) 50 MCG/ACT nasal spray INSTILL 2 SPRAYS INTO EACH NOSTRIL ONCE DAILY AS NEEDED FOR ALLERGIES 16 g 5   • atorvastatin (LIPITOR) 20 MG tablet Take 1 tablet by mouth nightly. Indications: High cholesterol 30 tablet 6   • albuterol 108 (90 BASE) MCG/ACT inhaler Inhale 2 puffs into the lungs every 4 hours as needed for Shortness of Breath or Wheezing. 1 Inhaler 0   • insulin glargine (TOUJEO SOLOSTAR) 300 UNIT/ML injection Inject 42 Units into the skin nightly. Indications: Type 2 Diabetes Please run through patients commercial insurance also 4.5 mL 11   • ergocalciferol (DRISDOL) 70081 UNITS capsule Take 1 capsule by mouth once a week. 12 capsule 2   • Flurandrenolide 0.05 % Cream Apply to psoriatic lesions on feet daily. 1 Tube 2   • amLODIPine (NORVASC) 10 MG tablet Indications: High Blood Pressure Take 1 tablet by mouth daily 90 tablet 3   • clopidogrel (PLAVIX) 75 MG tablet TAKE ONE TABLET BY MOUTH EVERY DAY 90 tablet 3   • allopurinol (ZYLOPRIM) 300 MG tablet TAKE ONE TABLET BY MOUTH EVERY DAY 30 tablet 5     No current facility-administered medications for this visit.        Nutrition Diagnosis:  No changes since initial/last visit .    Nutrition Prescription:  No changes since initial/last visit.    Intervention:  Nutrition-Related Medication Management  Medications ND-6.1: 38 units of Toujeo, 8 units of meal time insulin.    Nutrition Education:   Nutrition Education - Content (1)   Focused on:     Priority modifications E-1.2:  Discussed need for more glucose testing after meals to see if she does or does not require meal time insulin and to ensure that she is not going too low when using it.  She does  not get symptoms of hypoglycemia that she is aware of.     Nutrition Education - Application (2)    Skill development in the following areas E-2.2:  Nutrition - Effect of timing, amount and type of carbohydrates on blood sugar,  Effect of portion size,  Understanding of healthy food preparation (cooking methods, recipe modifcation),  Plate method,  Competent .      Diabetes Disease Process and Treatment Options:  Causes, risk factors, Symptoms of diabetes, Importance of diabetes control, Ongoing education and Possible treatment changes/options.  Competent    Physical Activity - Incorporating Activity into Lifestyle       Effects of physical activity on blood sugar, general health benefits, hypoglycemia prevention and Developing a physical activity plan/goals (types, frequency, duration, intensity, medical clearance)  Competent                                             Blood Glucose Monitoring - Blood Sugars:  Meter Used:  BD Contour meter  FBS ranges at home:  mg/dL  14 day average 122 mg/dL N 12  Ranges of other home BS:   BS at office visit:    Reviewed testing technique, times, record keeping, blood sugar targets, and sharps disposal.      Competent    Diabetes Medications  (dose, schedule, action, and side effects)  Competent    Acute Complications - Prevention Detection and Treatment:  Hypoglycemia (risks, causes, signs, symptoms, treatment, and prevention), Problem solving and when to call their provider, Safe driving practices and Medical identification use.  Competent    Chronic Complications - Prevention, Detection & Treatment:  Risk reduction strategies for prevention and treatment of nephropathy, retinopathy, neuropathy, frequent infections, cardiovascular disease, Essential care guidelines (MD/Dental,Eye exams, Lower extremity exams), Daily self exams of feet, Signs/symptoms of problems or infections, Skin care, skin hygiene hygiene and General health benefits.  Competent    Diabetes Psychosocial  Adjustment and Strategies:  Effects of stress on blood glucose, healthy coping strategies, problem-solving to identify individual stress triggers and tools to use to avoid/alter/adapt responses  Provided with community resources and support information  Competent      Nutrition Counseling:      Theoretical Basis/Approach (1)   Cognitive-Behavioral Theory and Health Belief Model   Utilizing:  Motivational interviewing, Goal setting, Self-monitoring and Problem solving.    Coordination of Nutrition Care:   none      Print/Written Resources Provided:   Diabetes Identification card and [facility] Diabetes ID form    Plan to evaluate HgbA1c: <7%    Goal Setting to Promote Health & Problem Solving for Daily Living was discussed.  Patient selected goal of:   Check blood sugars 2 times per day.  Patient achieved goal new% of the time.  The patient will carry their diabetes identification at all times.  Patient achieved goal new% of the time.    Diabetes Self-Management Support Plan:    Diabetes Support Group X Family Support    Diabetes Support Website  Exercise Facility:   X Diabetes Educator Contact Number  Senior Fitness Center:   X Care Management Program  Food/Weight Counseling Program    Patient Assistance Program  Annual Program Review   X Community Program: Living Well with Chronic Conditions Classes X Behavioral Change Specialist   X Community Program: Healthy Living with Diabetes  Websites: [email or web address].org    Exercise partner  Lunch rossana       The patient was encouraged to call back with any questions or concerns.    Return in about 3 months (around 7/24/2017).    See Patient Instructions for further information.    Thank you for your referral.    Please contact me with any questions/concerns.    Mabel Reed RD, CDE  Nutrition and Diabetes Education    [place] [phone]  [facility] [phone]    The report was sent to referring provider: KALIE Guidry    The MD referral is located in  EMR.    Supervising Physician present onsite is:  Rey Siegel MD      Diabetes Education/Medical Nutrition Therapy Referral Expires: 2/27/2018     Medicare hours remaining: DSMT:   MNT: 1 hour

## 2024-02-17 NOTE — SIGNIFICANT EVENT
Notified by Epilepsy fellow at 6 AM that patient had left hemispheric spikes which gave way to clinical seizure at 5:15 AM. Confirmed event with nurse and primary team resident it was again face clonic with R gaze deviation. EEG at that time consistent with electrographic status.    Patient is DNI.     Discussed best options with Epilepsy fellow. LEV level 23. Given Ativan is in NSU pixus whereas LAC must be delivered decision was made to give 2 mg IV ativan. This did change the EEG pattern to L frontal PLEDs.     Increased LEV to 1 g BID in coordination with primary team.    Will convey further recs following AM staffing.    Stacy Kim MD  PGY-3 Neurology  Epilepsy [id number]  U [id number]       ---   Post-rounds update:    Agree with the above recommendations. Continue with Keppra 1g BID. Further recs pending re-assessment of EEG.     Philip Gibson MD  PGY4 Neurology Resident

## 2024-02-17 NOTE — SIGNIFICANT EVENT
"Preliminary EEG Report    The first 30 minutes of this vEEG are indicative of a highly epileptogenic left hemispheric structural lesion. No seizures are recorded.    This EEG was read up until 3:13AM on 02/17/24.     The final impression will be available tomorrow under Chart Review in the Media tab.   To discontinue video EEG, place \"Discontinue Continuous VEEG\" order.     Chikis Mao MD  Epilepsy Fellow [id number]     "

## 2024-02-17 NOTE — SIGNIFICANT EVENT
Neurosurgery re-engaged for concern for increased hemorrhage on CTH, obtained after episode of AMS in the setting of seroquel/narcotics. Recommend repeat CTH in 6 hours to ensure stability.

## 2024-02-17 NOTE — SIGNIFICANT EVENT
Clinical Event Update  Date: 2/16/2024  Time: 20:40      Witnessed seizure activity at bedside. Patient noted to have sudden onset clonic facial jerking (L>R) with extreme conjugate right gaze deviation concerning for seizure activity. This episode persisted for approximately 30 seconds and spontaneously resolved, followed by onset of post-ictal appearance of sonorous respirations with persistent right gaze deviation. During the above witnessed episode, the monitor showed A-Fib with , SpO2 remained at 97% on 3 LPM NC. No oxygen desat noted during the episode. Baseline HR prior to and after the episode was 70's-80's. Airway remains intact and notably more somnolent.     Plan of action:  - STAT cEEG monitoring ordered  - LEV 20 mg/kg (1900 mg) IV load ordered  - Tube feeds stopped  - Seizure precautions  - Airway monitoring, NT suctioning as needed for airway clearance  - Current Code Status: Patient with DNR and No Intubation  - Plan for repeat interval CT Head at 1AM (2/17), as previously discussed with neurosurgery team (refer significant event note by Kristin Dunlap MD)  - Lovenox DVT ppx discontinued at this time given recently increased size of left frontal IPH per discussion with neurosurgery.  - Neurology service consult placed, recs:     -> agrees with cEEG monitoring     -> agrees with LEV 20 mg/kg load     -> Recommends increasing LEV maintenance to 750 BID     -> Will continue to follow at this time      Above plan of care discussed with trauma attending Dr. Deondre Myrick, APRN-CNP, ACNPC-AG  Trauma Surgical ICU  #[id number]

## 2024-02-17 NOTE — PROGRESS NOTES
[facility]  TRAUMA ICU - PROGRESS NOTE    Patient Name: Johnnie Rudolph  MRN: [id number]  Admit Date: 211  : 1940  AGE: 83 y.o.   GENDER: male  =================================================================  MECHANISM OF INJURY:   Fall down steps  LOC (yes/no?): Unknown  Anticoagulant / Anti-platelet Rx? (for what dx?): xarelto, AF and ischemic stroke hx (last dose am )  Referring Facility Name (N/A for scene EMR run): [facility]     INJURIES:   Trace acute SAH, left frontal lobe IPH, small SDH    OTHER MEDICAL PROBLEMS:  Ischemic stroke (10/23)  HTN  A fib   DM II  Glaucoma     INCIDENTAL FINDINGS:  None     PROCEDURES:    =================================================================    TODAY'S ASSESSMENT AND PLAN OF CARE:  Johnnie Rudolph is a 83 y.o. male in the ICU due to: ICH and Q1 hour neuro checks. Patient transferred to NSU and continues to be followed primarily by TSICU team. Seizure activity noted overnight and given Ativan 2mg. Keppra loaded and increased keppra dosing    NEURO/PAIN/SEDATION:   - ICH, SAH, SDH  - Baseline confusion  - Seroq Uel discontinued   - Discontinued Haldol due to sedative effects  - CT head repeat with worsening bleed, given andexa given xarelto usage, last dose  am  - Repeat CT Head  showed worsening bleed, now stable  - Repeat head CT  for worsening mental status - worsening bleed  - New seizure activity noted with clonic facial movements.  - EEG initiated, neurology consulted  - Keppra loaded - prophylaxis increased to 1000mg BID  - Repeat head CT stable  - Q4 hour neuro checks     RESPIRATORY:   - Respiratory hygiene ordered   - Frequent suctioning - nasotracheal suction Q2HR  - Duonebs ordered  - Chest xray showing no consolidations, effusions  - Maintain SpO2 > 92%    CARDIOVASC:   - HTN - resume Lisinopril/hydrochlorothiazide when able  - PRN labetalol for SBPs < 160  - Home atorvastatin dose restarted  -  Holding Xarelto until 2 weeks post fall and follow up with NSGY/Cardiology  - Cardiology to follow outpatient for long-term Xarelto planning  - Continuous telemetry    GI:   - Dobhoff placed  - Glucerna 1.5 TF - goal of 60 ml/hr per nutrition recs  -  ml Q6HR in setting of hyponatremia  - Holding tube feeds 2/17 - can restart 2/18 pending clinical status  - Sodium 136 this AM    :   - UOP 1500 ccs  - Replete electrolytes as needed  - Penile irritation from external cath, cleaned and replaced external cath    HEMATOLOGIC:   - Holding home xarelto   - Daily CBC     ENDOCRINE:   - DMII  - SSI while inpatient, takes metformin/glipizide at home   - BG range 165-231    MUSCULOSKELETAL/SKIN:   - No acute issues   - PT/OT     INFECTIOUS DISEASE:   - Leukocytosis downtrending to 14.2  - Afebrile  - Continue to monitor    GI PROPHYLAXIS: Protonix 40 mg IV     DVT PROPHYLAXIS: SCDs BLE, holding lovenox for worsening bleed.    DISPOSITION: TSICU    Patient seen and discussed with attending, Dr. Fuentes   ==============================================================================  CHIEF COMPLAINT / OVERNIGHT EVENTS / HPI:   EEG completed, no agitation overnight. Continues to decline to eat. Code status updated to DNR CCA after family discussion. Girlfriend is POA and paperwork in paper chart.     MEDICAL HISTORY / ROS:  Admission history and ROS reviewed. Pertinent changes as follows:      PHYSICAL EXAM:  Heart Rate:  []   Temp:  [35.8 °C (96.4 °F)-37 °C (98.6 °F)]   Resp:  [18-30]   BP: (119-159)/(56-91)   SpO2:  [92 %-99 %]     General: NAD, somnolent  HEENT: Normocephalic, atraumatic, EEG leads in place  Neuro: Seizure activity noted - not following commands, intermittent gaze deviation  Cardio: Rate controlled a fib  Resp: Unlabored breathing on 2LNC, significant secretions  Abdomen: Soft, nontender, nondistended  : External catheter in place draining dark yellow urine, improved scrotal erythema  MSK: BAUTISTA  spontaneously, not following commands  Extremities: Normal extremities, no edema or cyanosis  Skin: Warm and dry, no new lesions or rashes  Psych: Somnolent    IMAGING SUMMARY:  (summary of new imaging findings, not a copy of dictation)  Reviewed     LABS:  Results from last 7 days   Lab Units 02/17/24  0412 02/16/24  0154 02/15/24  0112 02/12/24  0107 02/11/24 1815 02/11/24  1316   WBC AUTO x10*3/uL 14.2* 15.4* 13.2*   < > 12.9* 7.5   HEMOGLOBIN g/dL 11.7* 12.0* 12.7*   < > 13.6 13.0*   HEMATOCRIT % 33.0* 33.0* 35.3*   < > 37.0* 37.2*   PLATELETS AUTO x10*3/uL 198 189 185   < > 208 201   NEUTROS PCT AUTO %  --   --   --   --  79.4 61.0   LYMPHS PCT AUTO %  --   --   --   --  12.1 25.7   MONOS PCT AUTO %  --   --   --   --  7.4 10.5   EOS PCT AUTO %  --   --   --   --  0.3 1.3    < > = values in this interval not displayed.       Results from last 7 days   Lab Units 02/17/24  0412 02/16/24  0154 02/15/24  0112   APTT seconds 30 31 31   INR  1.4* 1.4* 1.4*       Results from last 7 days   Lab Units 02/17/24  0412 02/16/24  0154 02/15/24  0112 02/12/24  0107 02/11/24 1815 02/11/24  1316   SODIUM mmol/L 136 132* 128*   < > 142 137   POTASSIUM mmol/L 4.2 4.2 4.6   < > 3.6 3.7   CHLORIDE mmol/L 101 100 99   < > 106 101   CO2 mmol/L 27 22 22   < > 26 27   BUN mg/dL 21 22 15   < > 12 13   CREATININE mg/dL 0.70 0.64 0.51   < > 0.69 0.83   CALCIUM mg/dL 8.5* 8.1* 7.6*   < > 9.5 9.3   PROTEIN TOTAL g/dL  --   --   --   --  6.2* 7.1   BILIRUBIN TOTAL mg/dL  --   --   --   --  0.9 1.0   ALK PHOS U/L  --   --   --   --  81 92   ALT U/L  --   --   --   --  27 31   AST U/L  --   --   --   --  34 29   GLUCOSE mg/dL 195* 195* 181*   < > 86 197*    < > = values in this interval not displayed.       Results from last 7 days   Lab Units 02/11/24  1815 02/11/24  1316   BILIRUBIN TOTAL mg/dL 0.9 1.0       Results from last 7 days   Lab Units 02/16/24  1311   POCT PH, ARTERIAL pH 7.46*   POCT PCO2, ARTERIAL mm Hg 35*   POCT PO2,  ARTERIAL mm Hg 67*   POCT HCO3 CALCULATED, ARTERIAL mmol/L 24.9   POCT BASE EXCESS, ARTERIAL mmol/L 1.4     I have reviewed all medications, laboratory results, and imaging pertinent for today's encounter.

## 2024-02-18 LAB
ALBUMIN SERPL BCP-MCNC: 2.7 G/DL (ref 3.4–5)
ANION GAP SERPL CALC-SCNC: 13 MMOL/L (ref 10–20)
APTT PPP: 31 SECONDS (ref 27–38)
BUN SERPL-MCNC: 14 MG/DL (ref 6–23)
CA-I BLD-SCNC: 1.17 MMOL/L (ref 1.1–1.33)
CALCIUM SERPL-MCNC: 8.3 MG/DL (ref 8.6–10.6)
CHLORIDE SERPL-SCNC: 102 MMOL/L (ref 98–107)
CO2 SERPL-SCNC: 25 MMOL/L (ref 21–32)
CREAT SERPL-MCNC: 0.51 MG/DL (ref 0.5–1.3)
EGFRCR SERPLBLD CKD-EPI 2021: >90 ML/MIN/1.73M*2
ERYTHROCYTE [DISTWIDTH] IN BLOOD BY AUTOMATED COUNT: 12.9 % (ref 11.5–14.5)
GLUCOSE BLD MANUAL STRIP-MCNC: 142 MG/DL (ref 74–99)
GLUCOSE BLD MANUAL STRIP-MCNC: 166 MG/DL (ref 74–99)
GLUCOSE BLD MANUAL STRIP-MCNC: 176 MG/DL (ref 74–99)
GLUCOSE BLD MANUAL STRIP-MCNC: 177 MG/DL (ref 74–99)
GLUCOSE BLD MANUAL STRIP-MCNC: 178 MG/DL (ref 74–99)
GLUCOSE SERPL-MCNC: 184 MG/DL (ref 74–99)
HCT VFR BLD AUTO: 35.8 % (ref 41–52)
HGB BLD-MCNC: 12 G/DL (ref 13.5–17.5)
INR PPP: 1.3 (ref 0.9–1.1)
MAGNESIUM SERPL-MCNC: 1.81 MG/DL (ref 1.6–2.4)
MCH RBC QN AUTO: 31.7 PG (ref 26–34)
MCHC RBC AUTO-ENTMCNC: 33.5 G/DL (ref 32–36)
MCV RBC AUTO: 95 FL (ref 80–100)
NRBC BLD-RTO: 0 /100 WBCS (ref 0–0)
PHOSPHATE SERPL-MCNC: 3.3 MG/DL (ref 2.5–4.9)
PLATELET # BLD AUTO: 229 X10*3/UL (ref 150–450)
POTASSIUM SERPL-SCNC: 4.6 MMOL/L (ref 3.5–5.3)
PROTHROMBIN TIME: 14.1 SECONDS (ref 9.8–12.8)
RBC # BLD AUTO: 3.79 X10*6/UL (ref 4.5–5.9)
SODIUM SERPL-SCNC: 135 MMOL/L (ref 136–145)
WBC # BLD AUTO: 10.4 X10*3/UL (ref 4.4–11.3)

## 2024-02-18 PROCEDURE — 1200000002 HC GENERAL ROOM WITH TELEMETRY DAILY

## 2024-02-18 PROCEDURE — 85610 PROTHROMBIN TIME: CPT | Performed by: STUDENT IN AN ORGANIZED HEALTH CARE EDUCATION/TRAINING PROGRAM

## 2024-02-18 PROCEDURE — 2500000001 HC RX 250 WO HCPCS SELF ADMINISTERED DRUGS (ALT 637 FOR MEDICARE OP)

## 2024-02-18 PROCEDURE — 2500000001 HC RX 250 WO HCPCS SELF ADMINISTERED DRUGS (ALT 637 FOR MEDICARE OP): Performed by: NURSE PRACTITIONER

## 2024-02-18 PROCEDURE — 2500000004 HC RX 250 GENERAL PHARMACY W/ HCPCS (ALT 636 FOR OP/ED): Performed by: STUDENT IN AN ORGANIZED HEALTH CARE EDUCATION/TRAINING PROGRAM

## 2024-02-18 PROCEDURE — 83735 ASSAY OF MAGNESIUM: CPT | Performed by: STUDENT IN AN ORGANIZED HEALTH CARE EDUCATION/TRAINING PROGRAM

## 2024-02-18 PROCEDURE — 2500000005 HC RX 250 GENERAL PHARMACY W/O HCPCS

## 2024-02-18 PROCEDURE — 95716 VEEG EA 12-26HR CONT MNTR: CPT

## 2024-02-18 PROCEDURE — 82330 ASSAY OF CALCIUM: CPT | Performed by: STUDENT IN AN ORGANIZED HEALTH CARE EDUCATION/TRAINING PROGRAM

## 2024-02-18 PROCEDURE — 2500000004 HC RX 250 GENERAL PHARMACY W/ HCPCS (ALT 636 FOR OP/ED)

## 2024-02-18 PROCEDURE — 80069 RENAL FUNCTION PANEL: CPT | Performed by: STUDENT IN AN ORGANIZED HEALTH CARE EDUCATION/TRAINING PROGRAM

## 2024-02-18 PROCEDURE — 2500000004 HC RX 250 GENERAL PHARMACY W/ HCPCS (ALT 636 FOR OP/ED): Performed by: NURSE PRACTITIONER

## 2024-02-18 PROCEDURE — 99232 SBSQ HOSP IP/OBS MODERATE 35: CPT | Performed by: STUDENT IN AN ORGANIZED HEALTH CARE EDUCATION/TRAINING PROGRAM

## 2024-02-18 PROCEDURE — 2500000001 HC RX 250 WO HCPCS SELF ADMINISTERED DRUGS (ALT 637 FOR MEDICARE OP): Performed by: STUDENT IN AN ORGANIZED HEALTH CARE EDUCATION/TRAINING PROGRAM

## 2024-02-18 PROCEDURE — 82947 ASSAY GLUCOSE BLOOD QUANT: CPT

## 2024-02-18 PROCEDURE — 95720 EEG PHY/QHP EA INCR W/VEEG: CPT | Performed by: PSYCHIATRY & NEUROLOGY

## 2024-02-18 PROCEDURE — 36415 COLL VENOUS BLD VENIPUNCTURE: CPT | Performed by: STUDENT IN AN ORGANIZED HEALTH CARE EDUCATION/TRAINING PROGRAM

## 2024-02-18 PROCEDURE — 2500000002 HC RX 250 W HCPCS SELF ADMINISTERED DRUGS (ALT 637 FOR MEDICARE OP, ALT 636 FOR OP/ED)

## 2024-02-18 PROCEDURE — 99291 CRITICAL CARE FIRST HOUR: CPT | Performed by: STUDENT IN AN ORGANIZED HEALTH CARE EDUCATION/TRAINING PROGRAM

## 2024-02-18 PROCEDURE — 94640 AIRWAY INHALATION TREATMENT: CPT

## 2024-02-18 PROCEDURE — C9113 INJ PANTOPRAZOLE SODIUM, VIA: HCPCS

## 2024-02-18 PROCEDURE — 94668 MNPJ CHEST WALL SBSQ: CPT

## 2024-02-18 PROCEDURE — 85027 COMPLETE CBC AUTOMATED: CPT | Performed by: STUDENT IN AN ORGANIZED HEALTH CARE EDUCATION/TRAINING PROGRAM

## 2024-02-18 RX ORDER — ENOXAPARIN SODIUM 100 MG/ML
30 INJECTION SUBCUTANEOUS 2 TIMES DAILY
Status: DISCONTINUED | OUTPATIENT
Start: 2024-02-18 | End: 2024-02-20

## 2024-02-18 RX ADMIN — IPRATROPIUM BROMIDE AND ALBUTEROL SULFATE 3 ML: .5; 3 SOLUTION RESPIRATORY (INHALATION) at 14:41

## 2024-02-18 RX ADMIN — PANTOPRAZOLE SODIUM 40 MG: 40 INJECTION, POWDER, FOR SOLUTION INTRAVENOUS at 08:10

## 2024-02-18 RX ADMIN — POLYETHYLENE GLYCOL 3350 17 G: 17 POWDER, FOR SOLUTION ORAL at 08:10

## 2024-02-18 RX ADMIN — ENOXAPARIN SODIUM 30 MG: 100 INJECTION SUBCUTANEOUS at 09:08

## 2024-02-18 RX ADMIN — CHLORHEXIDINE GLUCONATE 15 ML: 1.2 SOLUTION ORAL at 20:23

## 2024-02-18 RX ADMIN — METOPROLOL TARTRATE 25 MG: 25 TABLET, FILM COATED ORAL at 20:24

## 2024-02-18 RX ADMIN — INSULIN LISPRO 1 UNITS: 100 INJECTION, SOLUTION INTRAVENOUS; SUBCUTANEOUS at 16:34

## 2024-02-18 RX ADMIN — IPRATROPIUM BROMIDE AND ALBUTEROL SULFATE 3 ML: .5; 3 SOLUTION RESPIRATORY (INHALATION) at 08:24

## 2024-02-18 RX ADMIN — ENOXAPARIN SODIUM 30 MG: 100 INJECTION SUBCUTANEOUS at 20:23

## 2024-02-18 RX ADMIN — LEVETIRACETAM 2000 MG: 500 INJECTION, SOLUTION INTRAVENOUS at 20:24

## 2024-02-18 RX ADMIN — CHLORHEXIDINE GLUCONATE 15 ML: 1.2 SOLUTION ORAL at 08:10

## 2024-02-18 RX ADMIN — NYSTATIN 500000 UNITS: 100000 SUSPENSION ORAL at 12:55

## 2024-02-18 RX ADMIN — INSULIN LISPRO 1 UNITS: 100 INJECTION, SOLUTION INTRAVENOUS; SUBCUTANEOUS at 12:55

## 2024-02-18 RX ADMIN — NYSTATIN 500000 UNITS: 100000 SUSPENSION ORAL at 08:10

## 2024-02-18 RX ADMIN — Medication 2 L/MIN: at 08:33

## 2024-02-18 RX ADMIN — ATORVASTATIN CALCIUM 80 MG: 80 TABLET, FILM COATED ORAL at 20:24

## 2024-02-18 RX ADMIN — METOPROLOL TARTRATE 25 MG: 25 TABLET, FILM COATED ORAL at 08:10

## 2024-02-18 RX ADMIN — LACOSAMIDE 200 MG: 10 INJECTION INTRAVENOUS at 18:18

## 2024-02-18 RX ADMIN — INSULIN LISPRO 1 UNITS: 100 INJECTION, SOLUTION INTRAVENOUS; SUBCUTANEOUS at 04:41

## 2024-02-18 RX ADMIN — IPRATROPIUM BROMIDE AND ALBUTEROL SULFATE 3 ML: .5; 3 SOLUTION RESPIRATORY (INHALATION) at 19:54

## 2024-02-18 RX ADMIN — SODIUM CHLORIDE 75 ML/HR: 9 INJECTION, SOLUTION INTRAVENOUS at 09:09

## 2024-02-18 RX ADMIN — INSULIN LISPRO 1 UNITS: 100 INJECTION, SOLUTION INTRAVENOUS; SUBCUTANEOUS at 00:55

## 2024-02-18 RX ADMIN — INSULIN LISPRO 1 UNITS: 100 INJECTION, SOLUTION INTRAVENOUS; SUBCUTANEOUS at 20:08

## 2024-02-18 RX ADMIN — NYSTATIN 500000 UNITS: 100000 SUSPENSION ORAL at 16:00

## 2024-02-18 RX ADMIN — LEVETIRACETAM 2000 MG: 500 INJECTION, SOLUTION INTRAVENOUS at 08:11

## 2024-02-18 RX ADMIN — NYSTATIN 500000 UNITS: 100000 SUSPENSION ORAL at 20:24

## 2024-02-18 ASSESSMENT — PAIN - FUNCTIONAL ASSESSMENT
PAIN_FUNCTIONAL_ASSESSMENT: CPOT (CRITICAL CARE PAIN OBSERVATION TOOL)

## 2024-02-18 NOTE — CARE PLAN
The patient's goals for the shift include AGNIESZKA    The clinical goals for the shift include no fallsand increased neuro awareness    Over the shift, the patient did not make progress toward the following goals. Barriers to progression include patient on anti seizure meds and receptiveness is limited.. Recommendations to address these barriers include nothing at this time due to seizure frequency.

## 2024-02-18 NOTE — CARE PLAN
Problem: Pain  Goal: My pain/discomfort is manageable  Outcome: Progressing     Problem: Safety  Goal: Patient will be injury free during hospitalization  Outcome: Progressing  Goal: I will remain free of falls  Outcome: Progressing     Problem: Daily Care  Goal: Daily care needs are met  Outcome: Progressing     Problem: Psychosocial Needs  Goal: Demonstrates ability to cope with hospitalization/illness  Outcome: Progressing  Goal: Collaborate with me, my family, and caregiver to identify my specific goals  Outcome: Progressing     Problem: Discharge Barriers  Goal: My discharge needs are met  Outcome: Progressing     Problem: Skin  Goal: Decreased wound size/increased tissue granulation at next dressing change  Outcome: Progressing  Flowsheets (Taken 2/18/2024 0825)  Decreased wound size/increased tissue granulation at next dressing change:   Promote sleep for wound healing   Protective dressings over bony prominences  Goal: Participates in plan/prevention/treatment measures  Outcome: Progressing  Flowsheets (Taken 2/18/2024 0825)  Participates in plan/prevention/treatment measures:   Discuss with provider PT/OT consult   Elevate heels  Goal: Prevent/manage excess moisture  Outcome: Progressing  Flowsheets (Taken 2/18/2024 0825)  Prevent/manage excess moisture:   Cleanse incontinence/protect with barrier cream   Monitor for/manage infection if present  Goal: Prevent/minimize sheer/friction injuries  Outcome: Progressing  Flowsheets (Taken 2/18/2024 0825)  Prevent/minimize sheer/friction injuries:   HOB 30 degrees or less   Complete micro-shifts as needed if patient unable. Adjust patient position to relieve pressure points, not a full turn  Goal: Promote/optimize nutrition  Outcome: Progressing  Flowsheets (Taken 2/18/2024 0825)  Promote/optimize nutrition: Discuss with provider if NPO > 2 days  Goal: Promote skin healing  Outcome: Progressing  Flowsheets (Taken 2/18/2024 0825)  Promote skin healing: Assess  skin/pad under line(s)/device(s)     Problem: Fall/Injury  Goal: Not fall by end of shift  Outcome: Progressing  Goal: Be free from injury by end of the shift  Outcome: Progressing  Goal: Verbalize understanding of personal risk factors for fall in the hospital  Outcome: Progressing  Goal: Verbalize understanding of risk factor reduction measures to prevent injury from fall in the home  Outcome: Progressing  Goal: Use assistive devices by end of the shift  Outcome: Progressing  Goal: Pace activities to prevent fatigue by end of the shift  Outcome: Progressing     Problem: Safety - Medical Restraint  Goal: Remains free of injury from restraints (Restraint for Interference with Medical Device)  Outcome: Progressing  Goal: Free from restraint(s) (Restraint for Interference with Medical Device)  Outcome: Progressing   The patient's goals for the shift include      The clinical goals for the shift include Patient will have improvement in seizures this shift

## 2024-02-18 NOTE — PROGRESS NOTES
[facility]  TRAUMA ICU - PROGRESS NOTE    Patient Name: Johnnie Rudolph  MRN: [id number]  Admit Date: 211  : 1940  AGE: 83 y.o.   GENDER: male  =================================================================  MECHANISM OF INJURY:   Fall down steps  LOC (yes/no?): Unknown  Anticoagulant / Anti-platelet Rx? (for what dx?): xarelto, AF and ischemic stroke hx (last dose am )  Referring Facility Name (N/A for scene EMR run): [facility]     INJURIES:   Trace acute SAH, left frontal lobe IPH, small SDH    OTHER MEDICAL PROBLEMS:  Ischemic stroke (10/23)  HTN  A fib   DM II  Glaucoma     INCIDENTAL FINDINGS:  None     PROCEDURES:    =================================================================    TODAY'S ASSESSMENT AND PLAN OF CARE:  Johnnie Rudolph is a 83 y.o. male in the ICU due to: ICH and Q1 hour neuro checks. Patient transferred to NSU and continues to be followed primarily by TSICU team. Seizure activity noted overnight and given Ativan 2mg. Keppra loaded and increased keppra dosing    NEURO/PAIN/SEDATION:   # Left frontal IPH, SAH  - Seroq Uel discontinued   - given andexa given xarelto usage, last dose  am    # Seizure due to left Frontal foci  - New seizure activity noted with clonic facial movements.  - EEG initiated, neurology consulted  - Keppra loaded - increased to 2g BID  - Repeat head CT stable  - Q4 hour neuro checks     RESPIRATORY:   # Impaired airway: inability to clear oropharyngeal secretions  - Respiratory hygiene ordered   - Frequent suctioning - nasotracheal suction Q2HR  - Duonebs ordered  - Chest xray showing no consolidations, effusions  - Maintain SpO2 > 92%    CARDIOVASC:   - HTN - resume Lisinopril/hydrochlorothiazide when able  - PRN labetalol for SBPs < 160  - Home atorvastatin dose restarted  - Holding Xarelto until 2 weeks post fall and follow up with NSGY/Cardiology  - Cardiology to follow outpatient for long-term Xarelto planning  -  Continuous telemetry    GI:   - Dobhoff placed  - Glucerna 1.5 TF - restarted today at rate of 20, slow advance  -  ml Q6HR in setting of hyponatremia  - Sodium level stable    :   - Shahnaz, ext cath in place    HEMATOLOGIC:   #coagulopathy due to xarelto  - reversed wit andexxa  - Holding home xarelto   - Daily CBC     ENDOCRINE:   # DMII  - SSI while inpatient, takes metformin/glipizide at home       MUSCULOSKELETAL/SKIN:   - No acute issues   - PT/OT     INFECTIOUS DISEASE:   - WBC normalized, monitor    GI PROPHYLAXIS: Protonix 40 mg IV     DVT PROPHYLAXIS: SCDs BLE, resumed lovenox 30mg BID    Lines/Tubes  - dobhoff  - PIV  - ext cath    DISPOSITION: TSICU    Code status: DNR DNI    Patient seen and discussed with attending, Dr. Fuentes   ==============================================================================  CHIEF COMPLAINT / OVERNIGHT EVENTS / HPI:   Overnight, more seizure activity on EEG, keppra increased to 2g BID.     MEDICAL HISTORY / ROS:  Admission history and ROS reviewed. Pertinent changes as follows:      PHYSICAL EXAM:  Heart Rate:  []   Temp:  [36.2 °C (97.2 °F)-36.5 °C (97.7 °F)]   Resp:  [15-23]   BP: (110-145)/()   Weight:  [95 kg (209 lb 7 oz)-95.1 kg (209 lb 10.5 oz)]   SpO2:  [88 %-100 %]     General: NAD, somnolent  HEENT: Normocephalic, atraumatic, EEG leads in place  Neuro: did not visualize seizure activity. Opened eyes to voice, moved extremity spontaneous, did not follow directions. Pupils equal reactive. Incomprehensible sounds  Cardio: Rate controlled a fib  Resp: Unlabored breathing on 2LNC, significant secretions oropharynx  Abdomen: Soft, nontender, nondistended  : External catheter in place draining dark yellow urine  MSK: BAUM spontaneously, not following commands  Extremities: Normal extremities, no edema or cyanosis  Skin: Warm and dry, no new lesions or rashes  Psych: Somnolent    IMAGING SUMMARY:  (summary of new imaging findings, not a copy of  dictation)  No new image today    LABS:  Results from last 7 days   Lab Units 02/18/24  [facility] 02/17/24 0412 02/16/24  0154 02/12/24 0107 02/11/24  1815 02/11/24  1316   WBC AUTO x10*3/uL 10.4 14.2* 15.4*   < > 12.9* 7.5   HEMOGLOBIN g/dL 12.0* 11.7* 12.0*   < > 13.6 13.0*   HEMATOCRIT % 35.8* 33.0* 33.0*   < > 37.0* 37.2*   PLATELETS AUTO x10*3/uL 229 198 189   < > 208 201   NEUTROS PCT AUTO %  --   --   --   --  79.4 61.0   LYMPHS PCT AUTO %  --   --   --   --  12.1 25.7   MONOS PCT AUTO %  --   --   --   --  7.4 10.5   EOS PCT AUTO %  --   --   --   --  0.3 1.3    < > = values in this interval not displayed.       Results from last 7 days   Lab Units 02/18/24  [facility] 02/17/24 0412 02/16/24  0154   APTT seconds 31 30 31   INR  1.3* 1.4* 1.4*       Results from last 7 days   Lab Units 02/18/24  [facility] 02/17/24 0412 02/16/24 0154 02/12/24 0107 02/11/24 1815 02/11/24  1316   SODIUM mmol/L 135* 136 132*   < > 142 137   POTASSIUM mmol/L 4.6 4.2 4.2   < > 3.6 3.7   CHLORIDE mmol/L 102 101 100   < > 106 101   CO2 mmol/L 25 27 22   < > 26 27   BUN mg/dL 14 21 22   < > 12 13   CREATININE mg/dL 0.51 0.70 0.64   < > 0.69 0.83   CALCIUM mg/dL 8.3* 8.5* 8.1*   < > 9.5 9.3   PROTEIN TOTAL g/dL  --   --   --   --  6.2* 7.1   BILIRUBIN TOTAL mg/dL  --   --   --   --  0.9 1.0   ALK PHOS U/L  --   --   --   --  81 92   ALT U/L  --   --   --   --  27 31   AST U/L  --   --   --   --  34 29   GLUCOSE mg/dL 184* 195* 195*   < > 86 197*    < > = values in this interval not displayed.       Results from last 7 days   Lab Units 02/11/24  1815 02/11/24  1316   BILIRUBIN TOTAL mg/dL 0.9 1.0       Results from last 7 days   Lab Units 02/16/24  1311   POCT PH, ARTERIAL pH 7.46*   POCT PCO2, ARTERIAL mm Hg 35*   POCT PO2, ARTERIAL mm Hg 67*   POCT HCO3 CALCULATED, ARTERIAL mmol/L 24.9   POCT BASE EXCESS, ARTERIAL mmol/L 1.4       I have reviewed all medications, laboratory results, and imaging pertinent for today's encounter.

## 2024-02-18 NOTE — PROGRESS NOTES
S:    Per call team patient with increasing seizure frequency. Loaded with additional 1 g LEV and LEV increased to 2 g BID.     Patient seen and examined at bedside this AM. Rousable to sternal rub. No spontaneous speech.      O:    Mental Status:     Does not follow 1 step commands at midline. No spontaneous speech.    Motor:    BL UE at least 4/5 BL    BL LE at least 3/5 BL      Assessment:     Mr. Johnnie Rudolph is a 84 YO handedness unknown white male with PMH of HTN, DM2, atrial fibrillation (on Xarleto), and stroke who is currently admitted to trauma service for management of traumatic right frontal ICH. Epilepsy consulted for seizure like episode on 2/16/23. Patient subsequently found to be having multiple seizures 2/2 to R frontal ICH. Patient is now on maximal dosing of Keppra. We will continue to monitor on EEG and introduce additional agents as needed.      By semiology this was likely a seizure originating in the R hemisphere involving the motor cortex and frontal eye fields without further generalization. Regarding etiology this can be explained in the setting of expanding intercerebral hemorrhage. Regarding the etiology of the expanding hemorrhage we do wonder if this could be explained by the patient's slightly elevated INR and intermittent hypertension however we will differ this commentary and workup to NSGY and trauma teams.     4D Classification of the Paroxysmal Episodes:  EPE  Semiology: R Face clonic > R version  EZ: R Frontal Lobe  Etiology: Left F ICH   Co-morbidities: L MCA stroke, DM2     Recommendations:     -  Continue cVEEG      - Continue IV keppra 2 g q12h     - Next agent of choice will be Lacosamide; will use today's EEG data to guide; acknowledge patient is DNI and we are cautionous regarding increasing inability to protect airway      - Ativan 2 mg IV for prolonged motor seizure lasting more than 3 minutes or a cluster of 3 or more motor seizures in an 8 hour period     Shania Kim  MD  PGY-3 Neurology  Epilepsy [id number]  [facility] [id number]    I saw and evaluated the patient. I personally obtained the key and critical portions of the history and physical exam or was physically present for key and critical portions performed by the resident/fellow. I reviewed the resident/fellow's documentation and discussed the patient with the resident/fellow. I agree with the resident/fellow's medical decision making as documented in the note.    I spent 35 minutes in the professional and overall care of this patient.    Claudette Anne MD

## 2024-02-19 ENCOUNTER — APPOINTMENT (OUTPATIENT)
Dept: RADIOLOGY | Facility: HOSPITAL | Age: 84
DRG: 082 | End: 2024-02-19
Payer: MEDICARE

## 2024-02-19 PROBLEM — G93.40 ENCEPHALOPATHY: Status: ACTIVE | Noted: 2024-02-19

## 2024-02-19 PROBLEM — R56.9 SEIZURE (MULTI): Status: ACTIVE | Noted: 2024-02-19

## 2024-02-19 LAB
ALBUMIN SERPL BCP-MCNC: 2.8 G/DL (ref 3.4–5)
ANION GAP SERPL CALC-SCNC: 15 MMOL/L (ref 10–20)
APTT PPP: 43 SECONDS (ref 27–38)
BUN SERPL-MCNC: 14 MG/DL (ref 6–23)
CA-I BLD-SCNC: 1.13 MMOL/L (ref 1.1–1.33)
CALCIUM SERPL-MCNC: 8 MG/DL (ref 8.6–10.6)
CHLORIDE SERPL-SCNC: 97 MMOL/L (ref 98–107)
CO2 SERPL-SCNC: 27 MMOL/L (ref 21–32)
CREAT SERPL-MCNC: 0.51 MG/DL (ref 0.5–1.3)
EGFRCR SERPLBLD CKD-EPI 2021: >90 ML/MIN/1.73M*2
ERYTHROCYTE [DISTWIDTH] IN BLOOD BY AUTOMATED COUNT: 13 % (ref 11.5–14.5)
GLUCOSE BLD MANUAL STRIP-MCNC: 193 MG/DL (ref 74–99)
GLUCOSE BLD MANUAL STRIP-MCNC: 198 MG/DL (ref 74–99)
GLUCOSE BLD MANUAL STRIP-MCNC: 214 MG/DL (ref 74–99)
GLUCOSE SERPL-MCNC: 214 MG/DL (ref 74–99)
HCT VFR BLD AUTO: 35 % (ref 41–52)
HGB BLD-MCNC: 11.6 G/DL (ref 13.5–17.5)
INR PPP: 1.4 (ref 0.9–1.1)
MAGNESIUM SERPL-MCNC: 1.94 MG/DL (ref 1.6–2.4)
MCH RBC QN AUTO: 31.6 PG (ref 26–34)
MCHC RBC AUTO-ENTMCNC: 33.1 G/DL (ref 32–36)
MCV RBC AUTO: 95 FL (ref 80–100)
NRBC BLD-RTO: 0 /100 WBCS (ref 0–0)
PHOSPHATE SERPL-MCNC: 3.7 MG/DL (ref 2.5–4.9)
PLATELET # BLD AUTO: 282 X10*3/UL (ref 150–450)
POTASSIUM SERPL-SCNC: 4.9 MMOL/L (ref 3.5–5.3)
PROTHROMBIN TIME: 15.3 SECONDS (ref 9.8–12.8)
RBC # BLD AUTO: 3.67 X10*6/UL (ref 4.5–5.9)
SODIUM SERPL-SCNC: 134 MMOL/L (ref 136–145)
WBC # BLD AUTO: 11.4 X10*3/UL (ref 4.4–11.3)

## 2024-02-19 PROCEDURE — 71045 X-RAY EXAM CHEST 1 VIEW: CPT

## 2024-02-19 PROCEDURE — 1200000002 HC GENERAL ROOM WITH TELEMETRY DAILY

## 2024-02-19 PROCEDURE — 2500000004 HC RX 250 GENERAL PHARMACY W/ HCPCS (ALT 636 FOR OP/ED)

## 2024-02-19 PROCEDURE — 31720 CLEARANCE OF AIRWAYS: CPT

## 2024-02-19 PROCEDURE — 2500000005 HC RX 250 GENERAL PHARMACY W/O HCPCS

## 2024-02-19 PROCEDURE — 2500000002 HC RX 250 W HCPCS SELF ADMINISTERED DRUGS (ALT 637 FOR MEDICARE OP, ALT 636 FOR OP/ED)

## 2024-02-19 PROCEDURE — 95720 EEG PHY/QHP EA INCR W/VEEG: CPT | Performed by: PSYCHIATRY & NEUROLOGY

## 2024-02-19 PROCEDURE — 2500000001 HC RX 250 WO HCPCS SELF ADMINISTERED DRUGS (ALT 637 FOR MEDICARE OP): Performed by: STUDENT IN AN ORGANIZED HEALTH CARE EDUCATION/TRAINING PROGRAM

## 2024-02-19 PROCEDURE — 71045 X-RAY EXAM CHEST 1 VIEW: CPT | Performed by: RADIOLOGY

## 2024-02-19 PROCEDURE — 99291 CRITICAL CARE FIRST HOUR: CPT | Performed by: EMERGENCY MEDICINE

## 2024-02-19 PROCEDURE — 36415 COLL VENOUS BLD VENIPUNCTURE: CPT | Performed by: STUDENT IN AN ORGANIZED HEALTH CARE EDUCATION/TRAINING PROGRAM

## 2024-02-19 PROCEDURE — C9113 INJ PANTOPRAZOLE SODIUM, VIA: HCPCS

## 2024-02-19 PROCEDURE — 85610 PROTHROMBIN TIME: CPT | Performed by: STUDENT IN AN ORGANIZED HEALTH CARE EDUCATION/TRAINING PROGRAM

## 2024-02-19 PROCEDURE — 2500000001 HC RX 250 WO HCPCS SELF ADMINISTERED DRUGS (ALT 637 FOR MEDICARE OP): Performed by: NURSE PRACTITIONER

## 2024-02-19 PROCEDURE — 82947 ASSAY GLUCOSE BLOOD QUANT: CPT

## 2024-02-19 PROCEDURE — 2500000004 HC RX 250 GENERAL PHARMACY W/ HCPCS (ALT 636 FOR OP/ED): Performed by: STUDENT IN AN ORGANIZED HEALTH CARE EDUCATION/TRAINING PROGRAM

## 2024-02-19 PROCEDURE — 95716 VEEG EA 12-26HR CONT MNTR: CPT

## 2024-02-19 PROCEDURE — 82330 ASSAY OF CALCIUM: CPT | Performed by: STUDENT IN AN ORGANIZED HEALTH CARE EDUCATION/TRAINING PROGRAM

## 2024-02-19 PROCEDURE — 2500000004 HC RX 250 GENERAL PHARMACY W/ HCPCS (ALT 636 FOR OP/ED): Performed by: PHYSICIAN ASSISTANT

## 2024-02-19 PROCEDURE — 94668 MNPJ CHEST WALL SBSQ: CPT

## 2024-02-19 PROCEDURE — 94640 AIRWAY INHALATION TREATMENT: CPT

## 2024-02-19 PROCEDURE — 99233 SBSQ HOSP IP/OBS HIGH 50: CPT | Performed by: PHYSICIAN ASSISTANT

## 2024-02-19 PROCEDURE — 85027 COMPLETE CBC AUTOMATED: CPT | Performed by: STUDENT IN AN ORGANIZED HEALTH CARE EDUCATION/TRAINING PROGRAM

## 2024-02-19 PROCEDURE — 99232 SBSQ HOSP IP/OBS MODERATE 35: CPT | Performed by: PSYCHIATRY & NEUROLOGY

## 2024-02-19 PROCEDURE — 2500000001 HC RX 250 WO HCPCS SELF ADMINISTERED DRUGS (ALT 637 FOR MEDICARE OP)

## 2024-02-19 PROCEDURE — 2500000002 HC RX 250 W HCPCS SELF ADMINISTERED DRUGS (ALT 637 FOR MEDICARE OP, ALT 636 FOR OP/ED): Performed by: PHYSICIAN ASSISTANT

## 2024-02-19 PROCEDURE — 83735 ASSAY OF MAGNESIUM: CPT | Performed by: STUDENT IN AN ORGANIZED HEALTH CARE EDUCATION/TRAINING PROGRAM

## 2024-02-19 PROCEDURE — 80069 RENAL FUNCTION PANEL: CPT | Performed by: STUDENT IN AN ORGANIZED HEALTH CARE EDUCATION/TRAINING PROGRAM

## 2024-02-19 RX ORDER — MAGNESIUM SULFATE HEPTAHYDRATE 40 MG/ML
2 INJECTION, SOLUTION INTRAVENOUS ONCE
Status: COMPLETED | OUTPATIENT
Start: 2024-02-19 | End: 2024-02-19

## 2024-02-19 RX ADMIN — ENOXAPARIN SODIUM 30 MG: 100 INJECTION SUBCUTANEOUS at 09:00

## 2024-02-19 RX ADMIN — LEVETIRACETAM 2000 MG: 500 INJECTION, SOLUTION INTRAVENOUS at 20:29

## 2024-02-19 RX ADMIN — CHLORHEXIDINE GLUCONATE 15 ML: 1.2 SOLUTION ORAL at 09:00

## 2024-02-19 RX ADMIN — LACOSAMIDE 100 MG: 10 INJECTION INTRAVENOUS at 18:33

## 2024-02-19 RX ADMIN — ENOXAPARIN SODIUM 30 MG: 100 INJECTION SUBCUTANEOUS at 20:30

## 2024-02-19 RX ADMIN — INSULIN HUMAN 4 UNITS: 100 INJECTION, SOLUTION PARENTERAL at 21:56

## 2024-02-19 RX ADMIN — POLYETHYLENE GLYCOL 3350 17 G: 17 POWDER, FOR SOLUTION ORAL at 09:00

## 2024-02-19 RX ADMIN — INSULIN LISPRO 2 UNITS: 100 INJECTION, SOLUTION INTRAVENOUS; SUBCUTANEOUS at 12:06

## 2024-02-19 RX ADMIN — IPRATROPIUM BROMIDE AND ALBUTEROL SULFATE 3 ML: .5; 3 SOLUTION RESPIRATORY (INHALATION) at 19:47

## 2024-02-19 RX ADMIN — CHLORHEXIDINE GLUCONATE 15 ML: 1.2 SOLUTION ORAL at 20:30

## 2024-02-19 RX ADMIN — IPRATROPIUM BROMIDE AND ALBUTEROL SULFATE 3 ML: .5; 3 SOLUTION RESPIRATORY (INHALATION) at 02:55

## 2024-02-19 RX ADMIN — ATORVASTATIN CALCIUM 80 MG: 80 TABLET, FILM COATED ORAL at 20:30

## 2024-02-19 RX ADMIN — METOPROLOL TARTRATE 25 MG: 25 TABLET, FILM COATED ORAL at 09:00

## 2024-02-19 RX ADMIN — INSULIN LISPRO 2 UNITS: 100 INJECTION, SOLUTION INTRAVENOUS; SUBCUTANEOUS at 00:29

## 2024-02-19 RX ADMIN — LEVETIRACETAM 2000 MG: 500 INJECTION, SOLUTION INTRAVENOUS at 09:00

## 2024-02-19 RX ADMIN — LACOSAMIDE 100 MG: 10 INJECTION INTRAVENOUS at 06:51

## 2024-02-19 RX ADMIN — NYSTATIN 500000 UNITS: 100000 SUSPENSION ORAL at 09:00

## 2024-02-19 RX ADMIN — IPRATROPIUM BROMIDE AND ALBUTEROL SULFATE 3 ML: .5; 3 SOLUTION RESPIRATORY (INHALATION) at 09:58

## 2024-02-19 RX ADMIN — PANTOPRAZOLE SODIUM 40 MG: 40 INJECTION, POWDER, FOR SOLUTION INTRAVENOUS at 09:00

## 2024-02-19 RX ADMIN — METOPROLOL TARTRATE 25 MG: 25 TABLET, FILM COATED ORAL at 20:30

## 2024-02-19 RX ADMIN — INSULIN LISPRO 1 UNITS: 100 INJECTION, SOLUTION INTRAVENOUS; SUBCUTANEOUS at 05:01

## 2024-02-19 RX ADMIN — NYSTATIN 500000 UNITS: 100000 SUSPENSION ORAL at 12:06

## 2024-02-19 RX ADMIN — MAGNESIUM SULFATE HEPTAHYDRATE 2 G: 40 INJECTION, SOLUTION INTRAVENOUS at 09:01

## 2024-02-19 RX ADMIN — IPRATROPIUM BROMIDE AND ALBUTEROL SULFATE 3 ML: .5; 3 SOLUTION RESPIRATORY (INHALATION) at 15:12

## 2024-02-19 ASSESSMENT — PAIN - FUNCTIONAL ASSESSMENT
PAIN_FUNCTIONAL_ASSESSMENT: CPOT (CRITICAL CARE PAIN OBSERVATION TOOL)

## 2024-02-19 NOTE — PROGRESS NOTES
Johnnie Rudolph is a 83 y.o. male on day 8 of admission presenting with Intraparenchymal hemorrhage of brain (CMS/HCC).      Subjective   NAEON,        Objective     Heart Rate:  []   Temp:  [36.1 °C (97 °F)-36.6 °C (97.9 °F)]   Resp:  [15-25]   BP: (133-165)/(50-90)   Weight:  [95 kg (209 lb 7 oz)]   SpO2:  [95 %-99 %]       Physical Exam  Neurological Exam  Stupourus, opens eyes to nox stim, not following commands. Not reponding or tracking.   Motor:   BL UE at least 4/5 BL  BL LE at least 3/5 BL    Breathing with increased upper AW secretions.          Assessment/Plan   This patient currently has cardiac telemetry ordered; if you would like to modify or discontinue the telemetry order, click here to go to the orders activity to modify/discontinue the order.  Principal Problem:    Intraparenchymal hemorrhage of brain (CMS/HCC)    Mr. Johnnie Rudolph is a 84 YO handedness unknown white male with PMH of HTN, DM2, atrial fibrillation (on Xarleto), and stroke who is currently admitted to trauma service for management of traumatic left frontal ICH. Epilepsy consulted for seizure like episode on 2/16/23. Patient subsequently found to be having multiple seizures 2/2 to R frontal ICH. Patient is now on maximal dosing of Keppra. We will continue to monitor on EEG and introduce additional agents as needed.      4D Classification of the Paroxysmal Episodes:  EPE  Semiology: R Face clonic > R version  EZ: R Frontal Lobe  Etiology: Left F ICH   Co-morbidities: L MCA stroke, DM2    Updates:   - s/p 200mg Vimpat Load 2/18 then 100mg BID, due to increased seizures  - EEG from overnight showing only severe generalized encephlopathy with left structural lesion.      Recommendations:   - Continue cVEEG   - Continue IV keppra 2g q12h  - Continue IV Lacosamide 100mg BID  - PRN Ativan 2 mg IV for prolonged Generalized motor seizure lasting more than 3 minutes.(Please obtain caution with Ativan given pt is DNR/DNI).  - Please Page  with any concerns or questions.    Seen and discussed with Attending Dr. Holden.     Malaika Baker  PGY2 Neurology  Epilepsy Pager: [phone]      Malaika Baker MD

## 2024-02-19 NOTE — PROGRESS NOTES
Social Work Discharge Planning note:    SW received a Social Work consult: family is interested in talking to hospice for an informational meeting so they may better know their options. SW called Angela/medical POA, and had to leave a VM message. SW will continue to follow for emotional/incidental support to Pt/family, and for discharge planning.     Enrico Rodríguez, MSW, LSW

## 2024-02-19 NOTE — SIGNIFICANT EVENT
[facility]  TRAUMA ICU - GOALS OF CARE NOTE    Patient Name: Johnnie Rudolph  MRN: [id number]  : 1940  AGE: 83 y.o.   GENDER: male  ==============================================================================    GOALS OF CARE MEETING: [unfilled], 1:31 PM       Attendance:          Medical Staff:  Liudmila          Family: Significant Other          Patient: Present, not able to participate        Medical Decision Maker: Angela LIMA)       Baseline Code Status: DNR/DNI       Patient Summary:     Patient is an 81-year-old gentleman admitted to the hospital 2024 in the setting of fall at home.  Patient suffered a severe traumatic brain injury including subdural hematoma and intraparenchymal hemorrhage.  Course in the hospital has been complicated by seizures requiring titration up to 2 antiepileptics.  He remains encephalopathic, unresponsive to voice and tactile stimuli, with difficulty maintaining patent airway requiring frequent suctioning by RT.  I discussed with the patient significantly Angela at bedside her perspective the patient is recovering.  She notes that since admission she has been unresponsive, not interacting with her.  She notes that Johnnie is a very independent man, was still going to the store every day, and had expressly stated in the past that dependence on others for care, requiring a facility to live with him, and not living at home would be unacceptable quality of life for him.  Angela is understanding that oral may not be able to achieve that quality of life given his injuries and failure to improve and is interested in exploring hospice options for him.    As such I have asked social work to meet with the patient, offered [place] of choice, noted to have a hospice informational meeting.  Will continue current supportive care of the patient with potential transition to hospice after informational meeting.          CURRENT CODE STATUS: DNR Comfort Care  Arrest    I have personally spent 35 minutes of critical care time, exclusive of time spent on any procedures, in evaluation and management of this critically ill patient’s condition

## 2024-02-19 NOTE — CARE PLAN
The patient's goals for the shift include      The clinical goals for the shift include Patient will have improvement in seizures this shift    Over the shift, the patient did not make progress toward the following goals. Barriers to progression include seizures. Recommendations to address these barriers include continued seizure control.

## 2024-02-19 NOTE — PROGRESS NOTES
[facility]  TRAUMA ICU - PROGRESS NOTE    Patient Name: Johnnie Rudolph  MRN: [id number]  Admit Date: 211  : 1940  AGE: 83 y.o.   GENDER: male  =================================================================  MECHANISM OF INJURY:   Fall down steps    LOC (yes/no?): Unknown  Anticoagulant / Anti-platelet Rx? (for what dx?): xarelto, AF and ischemic stroke hx (last dose am )  Referring Facility Name (N/A for scene EMR run): [facility]     INJURIES:   Trace acute SAH, left frontal lobe IPH, small SDH    OTHER MEDICAL PROBLEMS:  Ischemic stroke (10/23)  HTN  A fib   DM II  Glaucoma     INCIDENTAL FINDINGS:  None     PROCEDURES:    =================================================================    TODAY'S ASSESSMENT AND PLAN OF CARE:  Johnnie Rudolph is a 83 y.o. male in the ICU due to: ICH and respiratory monitoring and new epileptic activity. Patient transferred to NSU and continues to be followed primarily by TSICU team. Witnessed seizure in TICU.     NEURO/PAIN/SEDATION:   # Left frontal IPH, SAH  # Seizure due to left Frontal foci  # h/o ischemic stroke (10/23)  # h/o glaucoma  - EEG initiated, neurology consulted   > lacosamide 200mg loaded and continued 100mg BID  - Keppra loaded - increased to 2g BID  - Q4 hour neuro checks   - restraints/mitts necessary for pt safety and to prevent pulling of lines  - Home eyedrops    RESPIRATORY:   # Impaired airway: inability to clear oropharyngeal secretions  - Respiratory hygiene ordered   - Frequent suctioning - nasotracheal suction Q2HR  - Duonebs ordered  - Chest xray showing no consolidations, effusions  - Maintain SpO2 > 92%, currently needing 3L O2 via NC    CARDIOVASC: #h/o HTN, A fib (Xarelto)  - HTN - continue to hold Lisinopril/hydrochlorothiazide  - PRN labetalol for SBPs >160  - Home atorvastatin dose continued  - Continue metoprolol 25mg BID  - Cardiology to follow outpatient for long-term Xarelto planning  -  Continuous telemetry    GI:   - NPO  - Glucerna 1.5 TF @ 60ml/hr goal via dobhoff  -  ml Q6HR  - Miralax  - Peridex for oral care    /FEN:   - Maintain UOP 0.5cc/kg/hr  -Monitor and replete electrolytes as indicated    HEMATOLOGIC: #ABLA  - Hgb 12.0 from 11.7  - Holding home xarelto   - Daily CBC     ENDOCRINE: #h/o DMII  - Glucose 142-193/24hrs requiring 6u insulin  - SSI while inpatient, takes metformin/glipizide at home    >increase to moderate scale today    MUSCULOSKELETAL/SKIN:   - No acute issues   - PT/OT     INFECTIOUS DISEASE:   - No acute issues  - WBC 10.4 from 14.2, monitor as indicated     GI PROPHYLAXIS: Protonix 40 mg IV     DVT PROPHYLAXIS: SCDs BLE, lovenox 30mg BID    Lines/Tubes  - dobhoff  - PIV  - ext cath    DISPOSITION: TSICU, discussion with POA regarding hospice today.  Social work engaged for coordinating hospice.    Code status: DNR/DNI    Seen and discussed with Dr. Liudmila Dotson PA-C  Trauma ICU  Team Phone: [phone]    ==============================================================================  CHIEF COMPLAINT / OVERNIGHT EVENTS / HPI:   No acute concerns overnight. Pt started on lacosamide yesterday PM per Neurology.    MEDICAL HISTORY / ROS:  Admission history and ROS reviewed. Pertinent changes as follows:  Unable to obtain secondary to mental status    PHYSICAL EXAM:  Heart Rate:  []   Temp:  [36.2 °C (97.2 °F)-36.6 °C (97.9 °F)]   Resp:  [15-23]   BP: (123-165)/(50-90)   Weight:  [95 kg (209 lb 7 oz)]   SpO2:  [95 %-99 %]     General: NAD, somnolent  HEENT: Normocephalic, EEG leads in place  Neuro: Opened eyes to stimulus, localizes pain, does not follow directions. Pupils equal reactive. Incomprehensible sounds. GCS 9 (E2V2M5)  Cardio: Rate controlled Afib on monitor   Resp: Unlaboured breathing on 3LNC, significant secretions oropharynx, gargled mouth breathing, b/l rhonchi  Abdomen: Soft, nontender, nondistended  : External catheter in place  draining clear yellow urine  MSK: edema to BUE   Extremities: Normal extremities, cap refill <2 secs  Skin: Warm and dry, no new lesions or rashes  Psych: Somnolent    IMAGING SUMMARY:  (summary of new imaging findings, not a copy of dictation)  CXR 2/19: Pulmonary edema, essentially unchanged from prior     LABS:  Results from last 7 days   Lab Units 02/18/24  1038 02/17/24  0412 02/16/24  0154   WBC AUTO x10*3/uL 10.4 14.2* 15.4*   HEMOGLOBIN g/dL 12.0* 11.7* 12.0*   HEMATOCRIT % 35.8* 33.0* 33.0*   PLATELETS AUTO x10*3/uL 229 198 189     Results from last 7 days   Lab Units 02/18/24  1038 02/17/24  0412 02/16/24  0154   APTT seconds 31 30 31   INR  1.3* 1.4* 1.4*     Results from last 7 days   Lab Units 02/18/24  1038 02/17/24  0412 02/16/24  0154   SODIUM mmol/L 135* 136 132*   POTASSIUM mmol/L 4.6 4.2 4.2   CHLORIDE mmol/L 102 101 100   CO2 mmol/L 25 27 22   BUN mg/dL 14 21 22   CREATININE mg/dL 0.51 0.70 0.64   CALCIUM mg/dL 8.3* 8.5* 8.1*   GLUCOSE mg/dL 184* 195* 195*         Results from last 7 days   Lab Units 02/16/24  1311   POCT PH, ARTERIAL pH 7.46*   POCT PCO2, ARTERIAL mm Hg 35*   POCT PO2, ARTERIAL mm Hg 67*   POCT HCO3 CALCULATED, ARTERIAL mmol/L 24.9   POCT BASE EXCESS, ARTERIAL mmol/L 1.4     I have reviewed all medications, laboratory results, and imaging pertinent for today's encounter.

## 2024-02-20 LAB
ATRIAL RATE: 288 BPM
GLUCOSE BLD MANUAL STRIP-MCNC: 158 MG/DL (ref 74–99)
GLUCOSE BLD MANUAL STRIP-MCNC: 173 MG/DL (ref 74–99)
GLUCOSE BLD MANUAL STRIP-MCNC: 198 MG/DL (ref 74–99)
GLUCOSE BLD MANUAL STRIP-MCNC: 212 MG/DL (ref 74–99)
GLUCOSE BLD MANUAL STRIP-MCNC: 217 MG/DL (ref 74–99)
GLUCOSE BLD MANUAL STRIP-MCNC: 218 MG/DL (ref 74–99)
GLUCOSE BLD MANUAL STRIP-MCNC: 232 MG/DL (ref 74–99)
GLUCOSE BLD MANUAL STRIP-MCNC: 237 MG/DL (ref 74–99)
GLUCOSE BLD MANUAL STRIP-MCNC: 265 MG/DL (ref 74–99)
Q ONSET: 222 MS
QRS COUNT: 14 BEATS
QRS DURATION: 94 MS
QT INTERVAL: 356 MS
QTC CALCULATION(BAZETT): 435 MS
QTC FREDERICIA: 407 MS
R AXIS: 34 DEGREES
T AXIS: 31 DEGREES
T OFFSET: 400 MS
VENTRICULAR RATE: 90 BPM

## 2024-02-20 PROCEDURE — 2500000004 HC RX 250 GENERAL PHARMACY W/ HCPCS (ALT 636 FOR OP/ED): Performed by: STUDENT IN AN ORGANIZED HEALTH CARE EDUCATION/TRAINING PROGRAM

## 2024-02-20 PROCEDURE — 2500000001 HC RX 250 WO HCPCS SELF ADMINISTERED DRUGS (ALT 637 FOR MEDICARE OP)

## 2024-02-20 PROCEDURE — 99223 1ST HOSP IP/OBS HIGH 75: CPT

## 2024-02-20 PROCEDURE — 82947 ASSAY GLUCOSE BLOOD QUANT: CPT

## 2024-02-20 PROCEDURE — 2500000002 HC RX 250 W HCPCS SELF ADMINISTERED DRUGS (ALT 637 FOR MEDICARE OP, ALT 636 FOR OP/ED)

## 2024-02-20 PROCEDURE — 1100000001 HC PRIVATE ROOM DAILY

## 2024-02-20 PROCEDURE — 2500000001 HC RX 250 WO HCPCS SELF ADMINISTERED DRUGS (ALT 637 FOR MEDICARE OP): Performed by: STUDENT IN AN ORGANIZED HEALTH CARE EDUCATION/TRAINING PROGRAM

## 2024-02-20 PROCEDURE — 99291 CRITICAL CARE FIRST HOUR: CPT | Performed by: EMERGENCY MEDICINE

## 2024-02-20 PROCEDURE — 95720 EEG PHY/QHP EA INCR W/VEEG: CPT | Performed by: PSYCHIATRY & NEUROLOGY

## 2024-02-20 PROCEDURE — 94668 MNPJ CHEST WALL SBSQ: CPT

## 2024-02-20 PROCEDURE — 2500000002 HC RX 250 W HCPCS SELF ADMINISTERED DRUGS (ALT 637 FOR MEDICARE OP, ALT 636 FOR OP/ED): Performed by: PHYSICIAN ASSISTANT

## 2024-02-20 PROCEDURE — 95716 VEEG EA 12-26HR CONT MNTR: CPT

## 2024-02-20 PROCEDURE — 99232 SBSQ HOSP IP/OBS MODERATE 35: CPT | Performed by: PSYCHIATRY & NEUROLOGY

## 2024-02-20 PROCEDURE — 99233 SBSQ HOSP IP/OBS HIGH 50: CPT | Performed by: PHYSICIAN ASSISTANT

## 2024-02-20 PROCEDURE — 94640 AIRWAY INHALATION TREATMENT: CPT

## 2024-02-20 PROCEDURE — 99497 ADVNCD CARE PLAN 30 MIN: CPT

## 2024-02-20 PROCEDURE — 2500000005 HC RX 250 GENERAL PHARMACY W/O HCPCS

## 2024-02-20 PROCEDURE — 2500000004 HC RX 250 GENERAL PHARMACY W/ HCPCS (ALT 636 FOR OP/ED)

## 2024-02-20 PROCEDURE — 2500000004 HC RX 250 GENERAL PHARMACY W/ HCPCS (ALT 636 FOR OP/ED): Performed by: PHYSICIAN ASSISTANT

## 2024-02-20 PROCEDURE — C9113 INJ PANTOPRAZOLE SODIUM, VIA: HCPCS

## 2024-02-20 RX ORDER — GLYCOPYRROLATE 0.2 MG/ML
0.4 INJECTION INTRAMUSCULAR; INTRAVENOUS EVERY 6 HOURS
Status: DISCONTINUED | OUTPATIENT
Start: 2024-02-20 | End: 2024-02-22 | Stop reason: HOSPADM

## 2024-02-20 RX ORDER — LORAZEPAM 2 MG/ML
2 INJECTION INTRAMUSCULAR EVERY 10 MIN PRN
Status: DISCONTINUED | OUTPATIENT
Start: 2024-02-20 | End: 2024-02-22 | Stop reason: HOSPADM

## 2024-02-20 RX ORDER — MORPHINE SULFATE 2 MG/ML
2 INJECTION, SOLUTION INTRAMUSCULAR; INTRAVENOUS
Status: DISCONTINUED | OUTPATIENT
Start: 2024-02-20 | End: 2024-02-22 | Stop reason: HOSPADM

## 2024-02-20 RX ORDER — HALOPERIDOL 5 MG/ML
1 INJECTION INTRAMUSCULAR EVERY 4 HOURS PRN
Status: DISCONTINUED | OUTPATIENT
Start: 2024-02-20 | End: 2024-02-22 | Stop reason: HOSPADM

## 2024-02-20 RX ORDER — ACETAMINOPHEN 650 MG/1
650 SUPPOSITORY RECTAL EVERY 4 HOURS PRN
Status: DISCONTINUED | OUTPATIENT
Start: 2024-02-20 | End: 2024-02-22 | Stop reason: HOSPADM

## 2024-02-20 RX ORDER — KETOROLAC TROMETHAMINE 15 MG/ML
15 INJECTION, SOLUTION INTRAMUSCULAR; INTRAVENOUS EVERY 6 HOURS PRN
Status: DISCONTINUED | OUTPATIENT
Start: 2024-02-20 | End: 2024-02-22 | Stop reason: HOSPADM

## 2024-02-20 RX ORDER — GLYCOPYRROLATE 0.2 MG/ML
0.4 INJECTION INTRAMUSCULAR; INTRAVENOUS EVERY 4 HOURS
Status: DISCONTINUED | OUTPATIENT
Start: 2024-02-20 | End: 2024-02-20

## 2024-02-20 RX ORDER — GLYCOPYRROLATE 0.2 MG/ML
0.2 INJECTION INTRAMUSCULAR; INTRAVENOUS EVERY 4 HOURS PRN
Status: DISCONTINUED | OUTPATIENT
Start: 2024-02-20 | End: 2024-02-20

## 2024-02-20 RX ORDER — ALBUTEROL SULFATE 0.83 MG/ML
2.5 SOLUTION RESPIRATORY (INHALATION) EVERY 4 HOURS PRN
Status: DISCONTINUED | OUTPATIENT
Start: 2024-02-20 | End: 2024-02-22 | Stop reason: HOSPADM

## 2024-02-20 RX ADMIN — LEVETIRACETAM 2000 MG: 500 INJECTION, SOLUTION INTRAVENOUS at 20:29

## 2024-02-20 RX ADMIN — POLYETHYLENE GLYCOL 3350 17 G: 17 POWDER, FOR SOLUTION ORAL at 09:31

## 2024-02-20 RX ADMIN — MORPHINE SULFATE 2 MG: 2 INJECTION, SOLUTION INTRAMUSCULAR; INTRAVENOUS at 15:37

## 2024-02-20 RX ADMIN — LACOSAMIDE 100 MG: 10 INJECTION INTRAVENOUS at 17:18

## 2024-02-20 RX ADMIN — INSULIN HUMAN 6 UNITS: 100 INJECTION, SOLUTION PARENTERAL at 11:58

## 2024-02-20 RX ADMIN — LACOSAMIDE 100 MG: 10 INJECTION INTRAVENOUS at 06:50

## 2024-02-20 RX ADMIN — IPRATROPIUM BROMIDE AND ALBUTEROL SULFATE 3 ML: .5; 3 SOLUTION RESPIRATORY (INHALATION) at 13:44

## 2024-02-20 RX ADMIN — MORPHINE SULFATE 2 MG: 2 INJECTION, SOLUTION INTRAMUSCULAR; INTRAVENOUS at 16:01

## 2024-02-20 RX ADMIN — GLYCOPYRROLATE 0.4 MG: 0.2 INJECTION INTRAMUSCULAR; INTRAVENOUS at 15:37

## 2024-02-20 RX ADMIN — INSULIN HUMAN 2 UNITS: 100 INJECTION, SOLUTION PARENTERAL at 06:52

## 2024-02-20 RX ADMIN — LEVETIRACETAM 2000 MG: 500 INJECTION, SOLUTION INTRAVENOUS at 09:30

## 2024-02-20 RX ADMIN — ENOXAPARIN SODIUM 30 MG: 100 INJECTION SUBCUTANEOUS at 09:29

## 2024-02-20 RX ADMIN — PANTOPRAZOLE SODIUM 40 MG: 40 INJECTION, POWDER, FOR SOLUTION INTRAVENOUS at 09:29

## 2024-02-20 RX ADMIN — IPRATROPIUM BROMIDE AND ALBUTEROL SULFATE 3 ML: .5; 3 SOLUTION RESPIRATORY (INHALATION) at 01:49

## 2024-02-20 RX ADMIN — CHLORHEXIDINE GLUCONATE 15 ML: 1.2 SOLUTION ORAL at 09:29

## 2024-02-20 RX ADMIN — IPRATROPIUM BROMIDE AND ALBUTEROL SULFATE 3 ML: .5; 3 SOLUTION RESPIRATORY (INHALATION) at 09:02

## 2024-02-20 RX ADMIN — CHLORHEXIDINE GLUCONATE 15 ML: 1.2 SOLUTION ORAL at 20:29

## 2024-02-20 RX ADMIN — INSULIN HUMAN 2 UNITS: 100 INJECTION, SOLUTION PARENTERAL at 02:37

## 2024-02-20 RX ADMIN — METOPROLOL TARTRATE 25 MG: 25 TABLET, FILM COATED ORAL at 09:29

## 2024-02-20 ASSESSMENT — RESPIRATORY DISTRESS OBSERVATION SCALE (RDOS)
RESTLESS NONPURPOSEFUL MOVEMENTS: 0 - NONE
RDOS TOTAL SCORE: 5
GRUNTING AT END OF EXPIRATION: 2 - PRESENT
RDOS TOTAL SCORE: 5
PARADOXICAL BREATHING PATTERN: 0 - NONE
RDOS TOTAL SCORE: 2
ACCESSORY MUSCLE RISE IN CLAVICLE DURING INSPIRATION: 0 - NONE
ACCESSORY MUSCLE RISE IN CLAVICLE DURING INSPIRATION: 0 - NONE
HEART RATE PER MINUTE: 1 - 90-109 BEATS
ACCESSORY MUSCLE RISE IN CLAVICLE DURING INSPIRATION: 0 - NONE
RESTLESS NONPURPOSEFUL MOVEMENTS: 0 - NONE
LOOK OF FEAR: 0 - NONE
PARADOXICAL BREATHING PATTERN: 0 - NONE
HEART RATE PER MINUTE: 2 - >109 BEATS
INVOLUNTARY NASAL FLARING: 0 - NONE
PARADOXICAL BREATHING PATTERN: 0 - NONE
RESPIRATORY RATE PER MINUTE: 1 - 19-30 BREATHS
LOOK OF FEAR: 0 - NONE
RESPIRATORY RATE PER MINUTE: 1 - 19-30 BREATHS
LOOK OF FEAR: 0 - NONE
GRUNTING AT END OF EXPIRATION: 0 - NONE
HEART RATE PER MINUTE: 1 - 90-109 BEATS
RESTLESS NONPURPOSEFUL MOVEMENTS: 1 - OCCASIONAL, SLIGHT MOVEMENTS
GRUNTING AT END OF EXPIRATION: 2 - PRESENT
INVOLUNTARY NASAL FLARING: 0 - NONE
RESPIRATORY RATE PER MINUTE: 1 - 19-30 BREATHS
INVOLUNTARY NASAL FLARING: 0 - NONE
GRUNTING AT END OF EXPIRATION: 0 - NONE
RDOS TOTAL SCORE: 2
RESTLESS NONPURPOSEFUL MOVEMENTS: 0 - NONE
RESPIRATORY RATE PER MINUTE: 1 - 19-30 BREATHS
ACCESSORY MUSCLE RISE IN CLAVICLE DURING INSPIRATION: 0 - NONE
INVOLUNTARY NASAL FLARING: 0 - NONE
PARADOXICAL BREATHING PATTERN: 0 - NONE
LOOK OF FEAR: 0 - NONE
HEART RATE PER MINUTE: 1 - 90-109 BEATS

## 2024-02-20 ASSESSMENT — PAIN - FUNCTIONAL ASSESSMENT

## 2024-02-20 NOTE — CARE PLAN
The patient's goals for the shift include      The clinical goals for the shift include Patient will have improvement in seizures this shift  Pt working toward all shift goals

## 2024-02-20 NOTE — PROGRESS NOTES
Participated in a family meeting with pt's sig. Other and HCPOA Angela Adan and his step-son Dwain with Giana Claire DNP.  Discussed pt's care plan with Dr. Haim Nur.  Family in agreement to make pt comfort care.  Angela and Dwain reported that pt was a very active man prior to this incident.  He was very active with yardwork, mowing his and neighbors yards, gardening, and loving on their many, many cats at home. Pt had been clear to Angela and family that he would never want to live a dependent life, and this is not a quality of life that would be acceptable to him.  Therefore, they would like pt to receive hospice care.  Angela realizes she can not care for him at home.  He had never wanted to be at a nursing home, nor can he afford the room and board costs that would be incurred.  Pt was a , but never received [facility] services.  Pt would qualify for inpt hospice house care because of his secretions, and frequent need for suctioning.  Angela was hesitant to agree to [facility] because she is not familiar with the [place] area, but step son Dwain promises that he and other family will drive her daily so that he is not alone.  Referral made, awaiting meeting time arrangements with Angela.      Nova Keys LCSW

## 2024-02-20 NOTE — PROGRESS NOTES
Physical Therapy                 Therapy Communication Note    Patient Name: Johnnie Rudolph  MRN: [id number]  Today's Date: 2/20/2024     Discipline: Physical Therapy    Missed Visit Reason: Missed Visit Reason:  (Pt pending family/hospice discussion. Plan to hold PT at this time.)    Missed Time: Attempt

## 2024-02-20 NOTE — PROGRESS NOTES
Communication Note    Patient Name: Johnnie Rudolph  MRN: [id number]  Today's Date: 2/20/2024     Discipline: Occupational Therapy      Missed Visit: Yes  Missed Visit Reason:  (Pt pending further family discussion re:GOC and possible transition to hospice. Will defer OT at this time.)      02/20/24 at 9:05 AM   Monica Lim OT   Rehab Office: [phone]

## 2024-02-20 NOTE — CONSULTS
"Inpatient consult to Palliative Care  Consult performed by: DILIP Mancia  Consult ordered by: Mayra Dotson PA-C      Palliative Medicine Consult  Complex medical decision making, symptom management, patient/family support    History obtained from chart review including ED note, H&P, patient's daily progress notes, review of lab/test results, and discussion with primary team and bedside RN.    Subjective    History of Present Illness  Mr. Johnnie Rudolph is an 83y gentleman with a pmh Afib, ischemic stroke in Oct 2023, HTN, T2DM. He is currently admitted to [facility] for ICH and new seizures after a fall, overall poor prognosis.     Introduction to Palliative Care  Met with significant other/HCPOA Angela Adan and his stepson Dwain at bedside.   Patient remains altered, does not have capacity to make their own medical decisions at this time. Unable to participate in ROS or goals of care discussion. Surrogate decision maker is Angela.  Staff present: Nova Faria CNP  Palliative Medicine was introduced as a specialty service for patients with serious illness to help with symptom management, improve quality of life, assist with goals of care conversations, navigate complex decision making, and provide support to patients and families. Support and empathy was provided throughout the encounter. Provided reflective listening and presence.     Symptoms  Pt minimally responsive, ROS limited    Palliative medicine social history:  Patient has been with partner Angela for 25+ years, they are not . Angela reports that patient has been very active prior to his declining health r/t strokes, he has loved being outside working in the lawn, gardening, and loves their cats.     Objective    Last Recorded Vitals  /72   Pulse (!) 136   Temp 36.7 °C (98.1 °F) (Temporal)   Resp 22   Ht 1.854 m (6' 0.99\") Comment: 6'1\"  Wt 95 kg (209 lb 7 oz)   SpO2 96%   BMI 27.64 kg/m²      Physical Exam  Constitutional:  "      General: He is in acute distress.      Appearance: He is ill-appearing.   HENT:      Head: Normocephalic.      Nose:      Comments: DHT     Mouth/Throat:      Mouth: Mucous membranes are dry.   Pulmonary:      Comments: Wet lung sounds, copious secretions  Neurological:      Mental Status: He is disoriented.      Comments: lethargic          Relevant Results  Results for orders placed or performed during the hospital encounter of 02/11/24 (from the past 24 hour(s))   POCT GLUCOSE   Result Value Ref Range    POCT Glucose 214 (H) 74 - 99 mg/dL   POCT GLUCOSE   Result Value Ref Range    POCT Glucose 198 (H) 74 - 99 mg/dL   POCT GLUCOSE   Result Value Ref Range    POCT Glucose 198 (H) 74 - 99 mg/dL   POCT GLUCOSE   Result Value Ref Range    POCT Glucose 232 (H) 74 - 99 mg/dL   POCT GLUCOSE   Result Value Ref Range    POCT Glucose 265 (H) 74 - 99 mg/dL      ECG 12 Lead  Atrial fibrillation  Cannot rule out Anterior infarct , age undetermined  Abnormal ECG  When compared with ECG of 17-FEB-2024 06:05,  No significant change was found  Confirmed by Brannon Deshpande ([id number]) on 2/20/2024 4:32:39 PM  EEG  IMPRESSION    This vEEG shows non-clinical status pattern arising from left temporal region. Seizures that last around 15 min to 1 hour are seen every 1-3 hours. The last seizure was at 7:37 AM on 2/20/2024. This EEG is also indicative of severe diffuse   encephalopathy.    A full report will be scanned into the patient's chart at a later time.    This report has been interpreted and electronically signed by     Encounter Date: 02/11/24   ECG 12 Lead   Result Value    Ventricular Rate 90    Atrial Rate 288    QRS Duration 94    QT Interval 356    QTC Calculation(Bazett) 435    R Axis 34    T Axis 31    QRS Count 14    Q Onset 222    T Offset 400    QTC Fredericia 407    Narrative    Atrial fibrillation  Cannot rule out Anterior infarct , age undetermined  Abnormal ECG  When compared with ECG of 17-FEB-2024 06:05,  No  significant change was found  Confirmed by Brannon Deshpande ([id number]) on 2/20/2024 4:32:39 PM        Allergies  Patient has no known allergies.    Scheduled medications  chlorhexidine, 15 mL, Mouth/Throat, BID  glycopyrrolate, 0.4 mg, intravenous, q4h  lacosamide, 100 mg, intravenous, q12h  levETIRAcetam, 2,000 mg, intravenous, q12h      Continuous medications     PRN medications  PRN medications: acetaminophen, albuterol, haloperidol lactate, haloperidol lactate, ketorolac, LORazepam, lubricating eye drops, morphine, morphine, oxygen, oxygen     Assessment/Plan    Mr. Johnnie Rudolph is an 83y gentleman with a pmh Afib, ischemic stroke in Oct 2023, HTN, T2DM. He is currently admitted to [facility] for ICH and new seizures after a fall, overall poor prognosis.     Palliative Performance Scale (PPS):     --------------------------------------------------------------------------------------------------------------------------------------------------------------------------  Advanced Care Planning  Patient and/or family consented to a voluntary Advanced Care Planning meeting.   Serious Illness Assessment and Counseling:  Life Limiting Disease: ICH and seizures posing threat to life or function.     Disease Specific Information Provided/Prognosis Discussed: Patient's current clinical condition, including diagnosis, prognosis, and management plan were discussed.     Understanding/Overall Impression: Angela expressing clear understanding of overall health status and severity of illness.     Goals/Hopes: Discussion ensued about patient's goals for their medical care going forward.  Discussed care plan to continue with aggressive hospital care despite symptom and treatment burden versus choosing to transition to comfort based plan of care that focuses on symptom management and quality of life. Angela feels that patient would not want to live in a state of dependence and would want to focus on comfort care / end of life  "care.    Fears/Worries/Concerns: Angela feels very worried about navigating her way to the hospice [place]    Minimal Acceptable Quality of Life/Maximal [place] Tolerable for the Possibility of More Time: Counseling provided on the concept of MAO/Maximal [place]. Angela expressing that pt would never want to be in a health state where they were dependent on other's for ADLs/toileting needs, bed bound, intubated or placed on a ventilator, have a permanent feeding tube if they could not eat, have a tracheostomy placed. Patient deems that this would not be an acceptable quality of life for the patient.     Resuscitation Assessment: Counseling provided on the benefit versus burden of CPR in the setting of patient's overall health status .     Advanced Directives:  Counseling provided on the importance of not crisis planning as disease burden progresses but to establish treatment limitations now so in the future medical team will be clear on what patient feels is an acceptable quality of life for the patient and what treatment limitations' patient would like set into place based on that.       Surrogate Health Care Decision Maker: Angela  HPOA: Yes, on file   Living will: Yes, on file     Code Status: Decision to change code status to comfort care     Hospice Discussion/Eligibility: Counseling provided on the benefit of Hospice Services in the setting of patient's serious illness to keep patient out of the hospital while supporting patient and family by providing counseling, aggressive symptom management,  prioritizing comfort and quality of life, alleviation of suffering, and allowing patient to pass with comfort and dignity. Patient is hospice-eligible.   Patient/Family Impression: family agreeable to discussing IPU with [facility]  All questions and concerns were addressed during encounter.     I spent 30 minutes in providing separately identifiable ACP services with the patient and/or " surrogate decision maker in a voluntary conversation discussing the patient's wishes and goals as detailed in the above note.   --------------------------------------------------------------------------------------------------------------------------------------------------------------------------    #Complex Medical Decision Making  #Goals of Care  #Advanced Care Planning  -code status: DNRCC   -surrogate decision maker: Angela Adan [phone]  - goals are comfort and quality of life based: consult placed to hospice services   - Advanced Directives on file   -tentative plan for inpatient unit at [facility] pending hospice meeting  -please remove DHT, family agreeable to discontinuing tube feeds    #increased secretions  -please start glycopyrrolate 0.4mg IV q4 hrs scheduled initially, once secretions improve space out to q6 hrs  -can start scopolamine patch if glycopyrrolate is ineffective     #dyspnea   #pain  -creatinine 0.51 2/20  -no known allergies to opiates  -pt likely to be opioid naive, no opiates in home med list  -please start morphine 2mg IV q15 min per RDOS, can increase dose as needed if ineffective  -wean oxygen as tolerated    #agitation  #restlessness  -lorazepam 1-2mg IV or diazepam 5-10mg IV    #psychosocial support  -music therapy  -spiritual care support      Plan of Care discussed with: Updated MD and bedside RN on goals of care decision, medication adjustments, and code status       Medical Decision Making was high level due to high complexity of problems, extensive data review, and high risk of management/treatment.     -significant ICH posing threat to life and function   -decision not to resuscitate or to de-escalate care because of poor prognosis: DNRCC  -parenteral controlled substances: IV morphine    Thank you for allowing us to participate in the care of this patient. Palliative will continue to follow as needed. Palliative medicine is available Monday-Friday, 8a-6p.  Please contact team with any questions or concerns.  Team pager [phone] (weekdays)  Giana Claire DNP, CNP

## 2024-02-20 NOTE — CONSULTS
"Nutrition Follow Up Assessment:   Nutrition Assessment         Per chart review, it is noted that family is interested in talking to hospice for an informational meeting.      Nutrition History:  Food and Nutrient History: Based on documentation, pt has received >/= 75% of needs over the past 24hrs. It is uncertain how adequate his intake has been prior to this (periods of time TF has needed to be held, rates not reflecting goal rate/usual rates that we provide (i.e. 38, 81, etc). Challenging to interpret.       Anthropometrics:  Height: 185.4 cm (6' 0.99\") (6'1\")   Weight: 95 kg (209 lb 7 oz) - as of 2/19/24  BMI (Calculated): 27.64  IBW/kg (Dietitian Calculated): 83.6 kg  Percent of IBW: 114 %  Adjusted Body Weight (kg): 86.5 kg    Weight History:   02/15/24 95.1 kg (209 lb 10.5 oz)   02/11/24 95.5 kg (210 lb 8.6 oz)   11/08/23 94.1 kg (207 lb 6.4 oz)   10/19/23 94.8 kg (209 lb)   10/13/23 97.8 kg (215 lb 9.8 oz)   07/24/23 94.3 kg (208 lb)     Weight Change %:  Significant Weight Loss: No    Nutrition Significant Labs:  CBC Trend:   Results from last 7 days   Lab Units 02/19/24 0914 02/18/24 1038 02/17/24 0412 02/16/24  0154   WBC AUTO x10*3/uL 11.4* 10.4 14.2* 15.4*   RBC AUTO x10*6/uL 3.67* 3.79* 3.65* 3.72*   HEMOGLOBIN g/dL 11.6* 12.0* 11.7* 12.0*   HEMATOCRIT % 35.0* 35.8* 33.0* 33.0*   MCV fL 95 95 90 89   PLATELETS AUTO x10*3/uL 282 229 198 189   BMP Trend:   Results from last 7 days   Lab Units 02/19/24 0914 02/18/24  1038 02/17/24 0412 02/16/24  0154   GLUCOSE mg/dL 214* 184* 195* 195*   CALCIUM mg/dL 8.0* 8.3* 8.5* 8.1*   SODIUM mmol/L 134* 135* 136 132*   POTASSIUM mmol/L 4.9 4.6 4.2 4.2   CO2 mmol/L 27 25 27 22   CHLORIDE mmol/L 97* 102 101 100   BUN mg/dL 14 14 21 22   CREATININE mg/dL 0.51 0.51 0.70 0.64   BG POCT trend:   Results from last 7 days   Lab Units 02/20/24  1150 02/20/24  0811 02/20/24  0358 02/19/24  2344 02/19/24  1950   POCT GLUCOSE mg/dL 265* 232* 198* 198* 214*   Renal Lab " Trend:   Results from last 7 days   Lab Units 02/19/24  0914 02/18/24  1038 02/17/24  0412 02/16/24  0154   POTASSIUM mmol/L 4.9 4.6 4.2 4.2   PHOSPHORUS mg/dL 3.7 3.3 3.1 2.9   SODIUM mmol/L 134* 135* 136 132*   MAGNESIUM mg/dL 1.94 1.81 1.95 1.91   EGFR mL/min/1.73m*2 >90 >90 >90 >90   BUN mg/dL 14 14 21 22   CREATININE mg/dL 0.51 0.51 0.70 0.64      Nutrition Specific Medications:  Scheduled medications  chlorhexidine, 15 mL, Mouth/Throat, BID  insulin regular, 0-10 Units, subcutaneous, q4h  pantoprazole, 40 mg, intravenous, Daily  polyethylene glycol, 17 g, oral, Daily    I/O:   Last BM Date: 02/19/24; Stool Appearance: Soft (02/19/24 1000)    Dietary Orders (From admission, onward)       Start     Ordered    02/19/24 1359  Enteral feeding with NPO Glucerna 1.5; 60; 250; Water; Every 6 hours  Diet effective now        Question Answer Comment   Tube feeding formula: Glucerna 1.5    Tube feeding continuous rate (mL/hr): 60    Tube feeding flush (mL): 250    Flush type: Water    Flush frequency: Every 6 hours        02/19/24 1358        Estimated Needs:   Total Energy Estimated Needs (kCal): 2100 kCal  Method for Estimating Needs: MSJ x AF&SF; kcal/kg  Total Protein Estimated Needs (g): 110 g  Method for Estimating Needs: range of 100-115 (1.2-1.4g/kg per IBW)  Total Fluid Estimated Needs (mL):  (1ml/kcal or per team)        Nutrition Diagnosis   Malnutrition Diagnosis  Patient has Malnutrition Diagnosis:  (still challenging to determine nutrition status - currently tolerating feeds at goal)    Nutrition Diagnosis  Patient has Nutrition Diagnosis: Yes  Diagnosis Status (1): Ongoing  Nutrition Diagnosis 1: Increased nutrient needs  Related to (1): increased metabolic demand  As Evidenced by (1): ICH  Additional Nutrition Diagnosis: Diagnosis 2  Diagnosis Status (2): Ongoing  Nutrition Diagnosis 2: Swallowing difficulity  Related to (2): stroke  As Evidenced by (2): failed swallow eval with SLP       Nutrition  Interventions/Recommendations         Nutrition Prescription:  Continue Glucerna 1.5 @ goal of 60ml/hr.   Team to continue to adjust free water flushes given periods of hyponatremia        Nutrition Interventions:   Interventions: Enteral intake  Goal: Glucerna 1.5 @ 60ml/hr = 1440mls, 2160kcals, 118g pro, 1093mls free H2O, 192g CHO       Nutrition Monitoring and Evaluation   Food/Nutrient Related History Monitoring  Monitoring and Evaluation Plan: Enteral and parenteral nutrition intake    Body Composition/Growth/Weight History  Monitoring and Evaluation Plan: Weight    Biochemical Data, Medical Tests and Procedures  Monitoring and Evaluation Plan: Electrolyte/renal panel, Glucose/endocrine profile            Time Spent/Follow-up Reminder:   Time Spent (min): 30 minutes  Last Date of Nutrition Visit: 02/20/24  Nutrition Follow-Up Needed?: Dietitian to reassess per policy  Follow up Comment: Tolerating Glucerna 1.5 at goal

## 2024-02-20 NOTE — PROGRESS NOTES
[facility]  TRAUMA ICU - PROGRESS NOTE    Patient Name: Johnnie Rudolph  MRN: [id number]  Admit Date: 211  : 1940  AGE: 83 y.o.   GENDER: male  =================================================================  MECHANISM OF INJURY:   Fall down steps    LOC (yes/no?): Unknown  Anticoagulant / Anti-platelet Rx? (for what dx?): xarelto, AF and ischemic stroke hx (last dose am )  Referring Facility Name (N/A for scene EMR run): [facility]     INJURIES:   Trace acute SAH, left frontal lobe IPH, small SDH    OTHER MEDICAL PROBLEMS:  Ischemic stroke (10/23)  HTN  A fib   DM II  Glaucoma     INCIDENTAL FINDINGS:  None     PROCEDURES:    =================================================================    TODAY'S ASSESSMENT AND PLAN OF CARE:  Johnnie Rudolph is a 83 y.o. male in the ICU due to: ICH, respiratory monitoring, and new epileptic activity. Patient transferred to NSU and continues to be followed primarily by TSICU team.     NEURO/PAIN/SEDATION:   # Left frontal IPH, SAH  # Seizure due to left Frontal foci  # h/o ischemic stroke (10/23)  # h/o glaucoma  - EEG initiated, neurology consulted, awaiting final EEG results   -->subclinical seizures seen on EEG, continue EEG  - lacosamide continued 100mg BID  - Keppra continued at 2g BID  - Q4 hour neuro checks   - restraints/mitts necessary for pt safety and to prevent pulling of lines  - Home eyedrops    RESPIRATORY:  # Impaired airway: inability to clear oropharyngeal secretions  - Respiratory hygiene ordered   - Frequent oral suctioning as not tolerating NT suctioning   - Duonebs ordered  - Maintain SpO2 > 92%, currently requiring 3L O2 via NC    CARDIOVASC: #h/o HTN, A fib (Xarelto)  - HTN - continue to hold Lisinopril/hydrochlorothiazide  - PRN labetalol for SBPs >160  - Home atorvastatin dose continued  - Continue metoprolol 25mg BID  - Cardiology would follow outpatient for long-term Xarelto planning  - Continuous  telemetry    GI:   - NPO  - Glucerna 1.5 TF @ 60ml/hr goal via dobhoff  -  ml Q6HR  - Miralax for BR, having BMs  - Peridex for oral care    /FEN:   - Maintain UOP 0.5cc/kg/hr  - Monitor and replete electrolytes as indicated    HEMATOLOGIC: #ABLA  - Hgb 12.0 from 11.7 yesterday  - Holding home xarelto     ENDOCRINE: #h/o DMII  - Glucose up to 232, requiring 10u insulin/24 hrs  - SSI #2 inpatient, takes metformin/glipizide at home     MUSCULOSKELETAL/SKIN:   - No acute issues     INFECTIOUS DISEASE: #leukocytosis  - No acute issues  - WBC 11.4 yesterday from 10.4, monitor as indicated     GI PROPHYLAXIS: Protonix 40 mg IV     DVT PROPHYLAXIS: SCDs BLE, lovenox 30mg BID    Lines/Tubes  - dobhoff  - PIV  - ext cath    DISPOSITION: TSICU, discussion with POA regarding hospice today.  Social work engaged for coordinating hospice. Palliative care also engaging. Continue ICU care (lab holiday today) until C decided.     Addendum at 1500: Patient's family with extensive goals of care conversations with Dr. Nur, palliative care team, and amongst themselves, and elected to pursue comfort care measures for the patient.  They would like antiepileptics (Keppra and lacosamide) continued for comfort, otherwise will pursue general comfort care.  Will discontinue tube feeds.  Oral suctioning with Yankauer as needed.  Palliative care to complete further recommendations.    Code status: DNR/DNI    Seen and discussed with Dr. Liudmila Dotson PA-C  Trauma ICU  Team Phone: [phone]    ==============================================================================  CHIEF COMPLAINT / OVERNIGHT EVENTS / HPI:   No acute concerns overnight.    MEDICAL HISTORY / ROS:  Admission history and ROS reviewed. Pertinent changes as follows:  Unable to obtain secondary to mental status    PHYSICAL EXAM:  Heart Rate:  []   Temp:  [36.1 °C (97 °F)-36.9 °C (98.4 °F)]   Resp:  [17-26]   BP: (118-160)/(56-97)   SpO2:  [91 %-99 %]      General: NAD, somnolent, ill-appearing elderly   HEENT: Normocephalic, EEG leads in place  Neuro: Opened eyes to stimulus, localizes pain, does not follow directions. Pupils equal reactive. Incomprehensible sounds. GCS 9 (E2V2M5)  Cardio: Rate controlled Afib on monitor   Resp: Unlaboured breathing on 3LNC, significant secretions in the oropharynx, gargled mouth breathing, b/l rhonchi  Abdomen: Soft, nontender, nondistended  : External catheter in place draining clear dark yellow urine  MSK: edema to BUE   Skin: Warm and dry, no new lesions or rashes  Psych: Somnolent    IMAGING SUMMARY:  (summary of new imaging findings, not a copy of dictation)  No new imaging today    LABS:  Results from last 7 days   Lab Units 02/19/24  0914 02/18/24  1038 02/17/24  0412   WBC AUTO x10*3/uL 11.4* 10.4 14.2*   HEMOGLOBIN g/dL 11.6* 12.0* 11.7*   HEMATOCRIT % 35.0* 35.8* 33.0*   PLATELETS AUTO x10*3/uL 282 229 198     Results from last 7 days   Lab Units 02/19/24  0914 02/18/24  1038 02/17/24  0412   APTT seconds 43* 31 30   INR  1.4* 1.3* 1.4*     Results from last 7 days   Lab Units 02/19/24  0914 02/18/24  1038 02/17/24  0412   SODIUM mmol/L 134* 135* 136   POTASSIUM mmol/L 4.9 4.6 4.2   CHLORIDE mmol/L 97* 102 101   CO2 mmol/L 27 25 27   BUN mg/dL 14 14 21   CREATININE mg/dL 0.51 0.51 0.70   CALCIUM mg/dL 8.0* 8.3* 8.5*   GLUCOSE mg/dL 214* 184* 195*         Results from last 7 days   Lab Units 02/16/24  1311   POCT PH, ARTERIAL pH 7.46*   POCT PCO2, ARTERIAL mm Hg 35*   POCT PO2, ARTERIAL mm Hg 67*   POCT HCO3 CALCULATED, ARTERIAL mmol/L 24.9   POCT BASE EXCESS, ARTERIAL mmol/L 1.4     I have reviewed all medications, laboratory results, and imaging pertinent for today's encounter.

## 2024-02-20 NOTE — PROGRESS NOTES
[facility]  TRAUMA - PROGRESS NOTE    Patient Name: Johnnie Rudolph  MRN: [id number]  Admit Date: 211  : 1940  AGE: 83 y.o.   GENDER: male  =================================================================  MECHANISM OF INJURY:   Fall down steps  LOC (yes/no?): Unknown  Anticoagulant / Anti-platelet Rx? (for what dx?): xarelto, AF and ischemic stroke hx (last dose am )  Referring Facility Name (N/A for scene EMR run): [facility]     INJURIES:   Trace acute SAH, left frontal lobe     OTHER MEDICAL PROBLEMS:  Ischemic stroke (10/23)  HTN  A fib   DM II  Glaucoma     INCIDENTAL FINDINGS:  None     PROCEDURES:    =================================================================    TODAY'S ASSESSMENT AND PLAN OF CARE:  Johnnie Rudolph is a 83 y.o. male in the ICU due to: ICH and Q1 hour neuro checks. CT head repeat with worsening bleed, given andexa given xarelto usage, last dose  am.    -Continue to hold xarelto  -Neurology following - appreciate recs    -Continue EEG, Keppra, PRN ativan  -Continue Tfs  -Provide oral hygiene, NT suctioning   -Patient DNR  -Family exploration hospice care at this time    Patient discussed with Attending, Dr. Velez.    Cade Stoll MD  General Surgery Resident  Trauma Surgery    ==============================================================================  CHIEF COMPLAINT / OVERNIGHT EVENTS / HPI:   NAEO    MEDICAL HISTORY / ROS:  Admission history and ROS reviewed. Pertinent changes as follows:      PHYSICAL EXAM:  Heart Rate:  []   Temp:  [36.5 °C (97.7 °F)-37.8 °C (100 °F)]   Resp:  [17-26]   BP: (118-160)/(56-97)   SpO2:  [91 %-99 %]     General: NAD, awake and alert, conversive  HEENT: Normocephalic, atraumatic  Neuro: GCS 10 E2V2M6  Cardio: Bradycardic episodes, asymptomatic  Resp: Unlabored breathing on RA  Abdomen: Soft, nontender, nondistended  : External catheter in place draining clear yellow urine  MSK: BAUM, normal  ROM. Strength 5/5  Extremities: Normal extremities, no edema or cyanosis  Skin: Warm and dry,  no new lesions or rashes  Psych: Appropriate mood and behavior    IMAGING SUMMARY:  (summary of new imaging findings, not a copy of dictation)  Reviewed     LABS:  Results from last 7 days   Lab Units 02/19/24  0914 02/18/24  1038 02/17/24  0412   WBC AUTO x10*3/uL 11.4* 10.4 14.2*   HEMOGLOBIN g/dL 11.6* 12.0* 11.7*   HEMATOCRIT % 35.0* 35.8* 33.0*   PLATELETS AUTO x10*3/uL 282 229 198       Results from last 7 days   Lab Units 02/19/24  0914 02/18/24  1038 02/17/24  0412   APTT seconds 43* 31 30   INR  1.4* 1.3* 1.4*       Results from last 7 days   Lab Units 02/19/24  0914 02/18/24  1038 02/17/24  0412   SODIUM mmol/L 134* 135* 136   POTASSIUM mmol/L 4.9 4.6 4.2   CHLORIDE mmol/L 97* 102 101   CO2 mmol/L 27 25 27   BUN mg/dL 14 14 21   CREATININE mg/dL 0.51 0.51 0.70   CALCIUM mg/dL 8.0* 8.3* 8.5*   GLUCOSE mg/dL 214* 184* 195*             Results from last 7 days   Lab Units 02/16/24  1311   POCT PH, ARTERIAL pH 7.46*   POCT PCO2, ARTERIAL mm Hg 35*   POCT PO2, ARTERIAL mm Hg 67*   POCT HCO3 CALCULATED, ARTERIAL mmol/L 24.9   POCT BASE EXCESS, ARTERIAL mmol/L 1.4       I have reviewed all medications, laboratory results, and imaging pertinent for today's encounter.

## 2024-02-20 NOTE — PROGRESS NOTES
Johnnie Rudolph is a 83 y.o. male on day 9 of admission presenting with Intraparenchymal hemorrhage of brain (CMS/HCC).      Subjective   NAEON, pt had few electrographil seizures with no clinical correlate overnight.        Objective     Heart Rate:  []   Temp:  [36.5 °C (97.7 °F)-37.8 °C (100 °F)]   Resp:  [17-26]   BP: (118-160)/(56-97)   SpO2:  [91 %-99 %]       Physical Exam  Neurological Exam  Stupourus, opens eyes to nox stim, not following commands. Not reponding or tracking.   Motor:   BL UE at least 4/5 BL  BL LE at least 3/5 BL    Breathing with increased upper AW secretions.     Assessment/Plan   This patient currently has cardiac telemetry ordered; if you would like to modify or discontinue the telemetry order, click here to go to the orders activity to modify/discontinue the order.  Principal Problem:    Intraparenchymal hemorrhage of brain (CMS/HCC)  Active Problems:    Seizure (CMS/HCC)    Encephalopathy    Mr. Johnnie Rudolph is a 84 YO handedness unknown white male with PMH of HTN, DM2, atrial fibrillation (on Xarleto), and stroke who is currently admitted to trauma service for management of traumatic left frontal ICH. Epilepsy consulted for seizure like episode on 2/16/23. Patient subsequently found to be having multiple seizures 2/2 to R frontal ICH. Patient is now on maximal dosing of Keppra. We will continue to monitor on EEG and introduce additional agents as needed.      4D Classification of the Paroxysmal Episodes:  EPE  Semiology: R Face clonic > R version  EZ: R Frontal Lobe  Etiology: Left F ICH   Co-morbidities: L MCA stroke, DM2    Updates 2/20/2024:   - EEG from overnight showing brief Electrographic seizures with no clinical correlate, initial plan was to increase lacosamide, however informed by primary team that pt has just been made comfort care after family discussion and EEG will be discontinued.     Recommendations:   - Continue AED at the same rate for now, Keppra 2g BID,  Lacosamide 100mg BID, Can do PO or IV as able.   - Can use IV Ativan PRN as needed to control seizures per comfort measure order.  - Please call for any questions or concerns    Seen and discussed with Attending Dr. Holden.     Malaika Baker  PGY2 Neurology  Epilepsy Pager: [phone]      Malaika Baker MD   Respiratory

## 2024-02-20 NOTE — CARE PLAN
Problem: Safety  Goal: Patient will be injury free during hospitalization  Outcome: Progressing  Goal: I will remain free of falls  Outcome: Progressing     Problem: Daily Care  Goal: Daily care needs are met  Outcome: Progressing     Problem: Psychosocial Needs  Goal: Demonstrates ability to cope with hospitalization/illness  Outcome: Progressing  Goal: Collaborate with me, my family, and caregiver to identify my specific goals  Outcome: Progressing     Problem: Discharge Barriers  Goal: My discharge needs are met  Outcome: Progressing     Problem: Skin  Goal: Decreased wound size/increased tissue granulation at next dressing change  Outcome: Progressing  Goal: Participates in plan/prevention/treatment measures  Outcome: Progressing  Goal: Prevent/manage excess moisture  Outcome: Progressing  Goal: Prevent/minimize sheer/friction injuries  Outcome: Progressing  Goal: Promote/optimize nutrition  Outcome: Progressing  Goal: Promote skin healing  Outcome: Progressing     Problem: Fall/Injury  Goal: Not fall by end of shift  Outcome: Progressing  Goal: Be free from injury by end of the shift  Outcome: Progressing  Goal: Verbalize understanding of personal risk factors for fall in the hospital  Outcome: Progressing  Goal: Verbalize understanding of risk factor reduction measures to prevent injury from fall in the home  Outcome: Progressing  Goal: Use assistive devices by end of the shift  Outcome: Progressing  Goal: Pace activities to prevent fatigue by end of the shift  Outcome: Progressing     Problem: Safety - Medical Restraint  Goal: Remains free of injury from restraints (Restraint for Interference with Medical Device)  Outcome: Progressing  Goal: Free from restraint(s) (Restraint for Interference with Medical Device)  Outcome: Progressing     Problem: General Stroke  Goal: Establish a mutual long term goal with patient by discharge  Outcome: Progressing  Goal: Demonstrate improvement in neurological exam  throughout the shift  Outcome: Progressing  Goal: Maintain BP within ordered limits throughout shift  Outcome: Progressing  Goal: Participate in treatment (ie., meds, therapy) throughout shift  Outcome: Progressing  Goal: No symptoms of aspiration throughout shift  Outcome: Progressing  Goal: No symptoms of hemorrhage throughout shift  Outcome: Progressing  Goal: Tolerate enteral feeding throughout shift  Outcome: Progressing  Goal: Decreased nausea/vomiting throughout shift  Outcome: Progressing  Goal: Controlled blood glucose throughout shift  Outcome: Progressing  Goal: Out of bed three times today  Outcome: Progressing     Problem: ICU Stroke  Goal: Maintain patent airway throughout shift  Outcome: Progressing  Goal: Achieve/maintain targeted sodium level throughout shift  Outcome: Progressing     Problem: Pain - Adult  Goal: Verbalizes/displays adequate comfort level or baseline comfort level  Outcome: Progressing     Problem: Safety - Adult  Goal: Free from fall injury  Outcome: Progressing     Problem: Discharge Planning  Goal: Discharge to home or other facility with appropriate resources  Outcome: Progressing     Problem: Chronic Conditions and Co-morbidities  Goal: Patient's chronic conditions and co-morbidity symptoms are monitored and maintained or improved  Outcome: Progressing     Problem: Diabetes  Goal: Achieve decreasing blood glucose levels by end of shift  Outcome: Progressing  Goal: Increase stability of blood glucose readings by end of shift  Outcome: Progressing  Goal: Maintain electrolyte levels within acceptable range throughout shift  Outcome: Progressing  Goal: Maintain glucose levels >70mg/dl to <250mg/dl throughout shift  Outcome: Progressing  Goal: No changes in neurological exam by end of shift

## 2024-02-21 PROCEDURE — 2500000004 HC RX 250 GENERAL PHARMACY W/ HCPCS (ALT 636 FOR OP/ED)

## 2024-02-21 PROCEDURE — 2500000005 HC RX 250 GENERAL PHARMACY W/O HCPCS

## 2024-02-21 PROCEDURE — 1100000001 HC PRIVATE ROOM DAILY

## 2024-02-21 PROCEDURE — 99233 SBSQ HOSP IP/OBS HIGH 50: CPT

## 2024-02-21 PROCEDURE — 99232 SBSQ HOSP IP/OBS MODERATE 35: CPT | Performed by: NURSE PRACTITIONER

## 2024-02-21 PROCEDURE — 2500000001 HC RX 250 WO HCPCS SELF ADMINISTERED DRUGS (ALT 637 FOR MEDICARE OP)

## 2024-02-21 RX ADMIN — GLYCOPYRROLATE 0.4 MG: 0.2 INJECTION, SOLUTION INTRAMUSCULAR; INTRAVENOUS at 15:50

## 2024-02-21 RX ADMIN — CHLORHEXIDINE GLUCONATE 15 ML: 1.2 SOLUTION ORAL at 10:00

## 2024-02-21 RX ADMIN — GLYCOPYRROLATE 0.4 MG: 0.2 INJECTION, SOLUTION INTRAMUSCULAR; INTRAVENOUS at 04:38

## 2024-02-21 RX ADMIN — LEVETIRACETAM 2000 MG: 500 INJECTION, SOLUTION INTRAVENOUS at 10:00

## 2024-02-21 RX ADMIN — LACOSAMIDE 100 MG: 10 INJECTION INTRAVENOUS at 18:24

## 2024-02-21 RX ADMIN — GLYCOPYRROLATE 0.4 MG: 0.2 INJECTION, SOLUTION INTRAMUSCULAR; INTRAVENOUS at 10:00

## 2024-02-21 RX ADMIN — GLYCOPYRROLATE 0.4 MG: 0.2 INJECTION, SOLUTION INTRAMUSCULAR; INTRAVENOUS at 22:01

## 2024-02-21 RX ADMIN — LACOSAMIDE 100 MG: 10 INJECTION INTRAVENOUS at 06:25

## 2024-02-21 RX ADMIN — LEVETIRACETAM 2000 MG: 500 INJECTION, SOLUTION INTRAVENOUS at 22:02

## 2024-02-21 NOTE — PROGRESS NOTES
SW spoke to pt's sig. Other Angela this morning.  She did not want to visit today, as she was having a difficult time seeing pt this way.  [facility] met with pts step son today and had a bed for pt at [facility], however could not obtain pt's seizure meds necessary to care for him at the facility.  They will order them tonight so they are available for a possible transfer tomorrow if pt is medically stable.  Will continue to follow and support as needed.      Nova Keys, HANSELW

## 2024-02-21 NOTE — PROGRESS NOTES
[facility]  TRAUMA SERVICE - PROGRESS NOTE    Patient Name: Johnnie Rudolph  MRN: [id number]  Admit Date: 211  : 1940  AGE: 83 y.o.   GENDER: male  ==============================================================================    MECHANISM OF INJURY:   Fall down steps     LOC (yes/no?): Unknown  Anticoagulant / Anti-platelet Rx? (for what dx?): xarelto, AF and ischemic stroke hx (last dose am )  Referring Facility Name (N/A for scene EMR run): [facility]      INJURIES:   Trace acute SAH, left frontal lobe IPH, small SDH     OTHER MEDICAL PROBLEMS:  Ischemic stroke (10/23)  HTN  A fib   DM II  Glaucoma      INCIDENTAL FINDINGS:  None     ==============================================================================  TODAY'S ASSESSMENT AND PLAN OF CARE:  1.Comfort care code status placed yesterday  2.Palliative care consulted, Hospice consulted  3.Comfort care order set placed yesterday  4.Continue antiepileptics     Dispo: Pending hospice final plans.     Pt. Seen and discussed with Dr. Ugalde.     Ermelinda Almanzar, APRN-[facility]  Trauma Surgery  [id number]    Total face to face time spent with patient/family of 15minutes, with >50% of the time spent discussing plan of care/management, counseling/educating on disease processes, explaining results of diagnostic testing.         ==============================================================================  CHIEF COMPLAINT / OVERNIGHT EVENTS:   Transfer to regular nursing floor overnight.     MEDICAL HISTORY / ROS:  Admission history and ROS reviewed. Pertinent changes as follows:  Unable to obtain ROS d/t mentation.     PHYSICAL EXAM:  Heart Rate:  []   Temp:  [36.7 °C (98.1 °F)-37.9 °C (100.2 °F)]   Resp:  [18-27]   BP: (115-160)/(65-79)   Weight:  [93 kg (205 lb 0.4 oz)]   SpO2:  [90 %-96 %]   Physical Exam  Vitals reviewed.   Constitutional:       Comments: Somnolent    HENT:      Head: Normocephalic.      Right Ear: External  ear normal.      Left Ear: External ear normal.      Nose: Nose normal.      Mouth/Throat:      Mouth: Mucous membranes are dry.   Cardiovascular:      Pulses: Normal pulses.   Pulmonary:      Comments: On 2L nc, respirations nonlabored   Abdominal:      General: There is no distension.      Palpations: Abdomen is soft.      Tenderness: There is no abdominal tenderness.   Genitourinary:     Comments: Voids yellow urine via external catheter.   Musculoskeletal:      Cervical back: Neck supple.   Skin:     General: Skin is warm and dry.   Neurological:      Comments: Somnolent              LABS:  Results from last 7 days   Lab Units 02/19/24  0914 02/18/24  1038 02/17/24  0412   WBC AUTO x10*3/uL 11.4* 10.4 14.2*   HEMOGLOBIN g/dL 11.6* 12.0* 11.7*   HEMATOCRIT % 35.0* 35.8* 33.0*   PLATELETS AUTO x10*3/uL 282 229 198     Results from last 7 days   Lab Units 02/19/24  0914 02/18/24  1038 02/17/24  0412   APTT seconds 43* 31 30   INR  1.4* 1.3* 1.4*     Results from last 7 days   Lab Units 02/19/24  0914 02/18/24  1038 02/17/24  0412   SODIUM mmol/L 134* 135* 136   POTASSIUM mmol/L 4.9 4.6 4.2   CHLORIDE mmol/L 97* 102 101   CO2 mmol/L 27 25 27   BUN mg/dL 14 14 21   CREATININE mg/dL 0.51 0.51 0.70   CALCIUM mg/dL 8.0* 8.3* 8.5*   GLUCOSE mg/dL 214* 184* 195*         Results from last 7 days   Lab Units 02/16/24  1311   POCT PH, ARTERIAL pH 7.46*   POCT PCO2, ARTERIAL mm Hg 35*   POCT PO2, ARTERIAL mm Hg 67*   POCT HCO3 CALCULATED, ARTERIAL mmol/L 24.9   POCT BASE EXCESS, ARTERIAL mmol/L 1.4       I have reviewed all medications, laboratory results, and imaging pertinent for today's encounter.

## 2024-02-21 NOTE — PROGRESS NOTES
"Palliative Medicine following for:  Complex medical decision making, symptom management, patient/family support    History obtained from chart review including ED note, H&P, patient's daily progress notes, review of lab/test results, and discussion with primary team and bedside RN.    Subjective    History of Present Illness  Mr. Johnnie Rudolph is an 83y gentleman with a pmh Afib, ischemic stroke in Oct 2023, HTN, T2DM. He was initially admitted to [facility] for ICH and new seizures after a fall, overall poor prognosis.  Pt now on regular nursing floor receiving end-of-life care, planning to transition to [facility] inpatient unit.     Symptoms  ROS limited, pt lethargic and minimally responsive    Objective    Last Recorded Vitals  /73 (BP Location: Left arm, Patient Position: Lying)   Pulse (!) 118   Temp 37.9 °C (100.2 °F) (Temporal)   Resp 18   Ht 1.854 m (6' 0.99\") Comment: 6'1\"  Wt 93 kg (205 lb 0.4 oz)   SpO2 93%   BMI 27.06 kg/m²      Physical Exam  Constitutional:       Appearance: He is ill-appearing.   HENT:      Head: Normocephalic.   Pulmonary:      Comments: Improving respiratory secretions   Abdominal:      Palpations: Abdomen is soft.   Skin:     General: Skin is warm and dry.   Neurological:      Mental Status: He is disoriented.          Relevant Results   No results found for this or any previous visit (from the past 24 hour(s)).   ECG 12 Lead  Atrial fibrillation  Cannot rule out Anterior infarct , age undetermined  Abnormal ECG  When compared with ECG of 17-FEB-2024 06:05,  No significant change was found  Confirmed by Brannon Deshpande ([id number]) on 2/20/2024 4:32:39 PM  EEG  IMPRESSION    This vEEG shows non-clinical status pattern arising from left temporal region. Seizures that last around 15 min to 1 hour are seen every 1-3 hours. The last seizure was at 7:37 AM on 2/20/2024. This EEG is also indicative of severe diffuse   encephalopathy.    A full report will be scanned into the patient's chart at a " later time.    This report has been interpreted and electronically signed by    Encounter Date: 02/11/24   ECG 12 Lead   Result Value    Ventricular Rate 90    Atrial Rate 288    QRS Duration 94    QT Interval 356    QTC Calculation(Bazett) 435    R Axis 34    T Axis 31    QRS Count 14    Q Onset 222    T Offset 400    QTC Fredericia 407    Narrative    Atrial fibrillation  Cannot rule out Anterior infarct , age undetermined  Abnormal ECG  When compared with ECG of 17-FEB-2024 06:05,  No significant change was found  Confirmed by Brannon Deshpande ([id number]) on 2/20/2024 4:32:39 PM      Allergies  Patient has no known allergies.  Medications  Scheduled medications  chlorhexidine, 15 mL, Mouth/Throat, BID  glycopyrrolate, 0.4 mg, intravenous, q6h  lacosamide, 100 mg, intravenous, q12h  levETIRAcetam, 2,000 mg, intravenous, q12h      Continuous medications     PRN medications  PRN medications: acetaminophen, albuterol, haloperidol lactate, haloperidol lactate, ketorolac, LORazepam, lubricating eye drops, morphine, morphine, oxygen, oxygen     Assessment/Plan    Mr. Johnnie Rudolph is an 83y gentleman with a pmh Afib, ischemic stroke in Oct 2023, HTN, T2DM. He was initially admitted to [facility] for ICH and new seizures after a fall, overall poor prognosis.  Pt now on regular nursing floor receiving end-of-life care, planning to transition to R inpatient unit.     #Complex Medical Decision Making   #Goals of Care  #Advance Care Planning   - Code status: DNRCC  - Surrogate decision maker: Angela Adan  - Goals are comfort and quality of life based: consult placed to hospice services. Planning to move patient to R inpatient unit at [place]  - Advanced Directives on file       Comfort care orders  -please continue glycopyrrolate 0.4mg IV q6 hrs scheduled, secretions have significantly improved with this regimen  -24 OME total= 6  -continue morphine 2mg IV q15min PRN per RDOS  -lacosamide, keppra, and ativan per primary team      #psychosocial support  - Music therapy  - Spiritual care support  - Art therapy  - Pet therapy     Plan of Care discussed with: Updated MD and bedside RN on goals of care decision, medication adjustments, and code status     Medical Decision Making was high level due to high complexity of problems, extensive data review, and high risk of management/treatment.     -significant ICH posing threat to life and function   -parenteral controlled substances: IV morphine     Thank you for allowing us to care for this patient. Palliative Team will continue to follow as needed. Please contact team with any questions or concerns.   Team pager [phone] (weekdays)  Giana Claire DNP, CNP

## 2024-02-21 NOTE — PROGRESS NOTES
Hospice Nursing Note     Johnnie Rudolph      Met with pt step son, Dwain Crenshaw who pt STEPHANIE Miller gave permission to sign documents in her place. All hospice and [facility] IPU documents signed. Attempted to transfer pt this evening. Unable to transport until tomorrow d/t IV seizure meds not in stock at our facility. Bed on hold and will attempt to transfer tomorrow.    Thanks,    Katiana Gonzalez RN  [facility]

## 2024-02-21 NOTE — CARE PLAN
The patient's goals for the shift include      The clinical goals for the shift include Pt will have decrease secretions and improvement with seizures throughout this shift

## 2024-02-21 NOTE — CARE PLAN
The patient's goals for the shift include      The clinical goals for the shift include Pt will have decrease secretions and improvement with seizures throughout this shift    Over the shift, the patient did not make progress toward the following goals. Barriers to progression include Recommendations to address these barriers include     Problem: Pain  Goal: My pain/discomfort is manageable  Outcome: Not Progressing     Problem: Safety  Goal: Patient will be injury free during hospitalization  Outcome: Not Progressing  Goal: I will remain free of falls  Outcome: Not Progressing     Problem: Daily Care  Goal: Daily care needs are met  Outcome: Not Progressing     Problem: Psychosocial Needs  Goal: Demonstrates ability to cope with hospitalization/illness  Outcome: Not Progressing  Goal: Collaborate with me, my family, and caregiver to identify my specific goals  Outcome: Not Progressing     Problem: Discharge Barriers  Goal: My discharge needs are met  Outcome: Not Progressing     Problem: Skin  Goal: Decreased wound size/increased tissue granulation at next dressing change  Outcome: Not Progressing  Goal: Participates in plan/prevention/treatment measures  Outcome: Not Progressing  Goal: Prevent/manage excess moisture  Outcome: Not Progressing  Goal: Prevent/minimize sheer/friction injuries  Outcome: Not Progressing  Goal: Promote/optimize nutrition  Outcome: Not Progressing  Goal: Promote skin healing  Outcome: Not Progressing     Problem: Fall/Injury  Goal: Not fall by end of shift  Outcome: Not Progressing  Goal: Be free from injury by end of the shift  Outcome: Not Progressing  Goal: Verbalize understanding of personal risk factors for fall in the hospital  Outcome: Not Progressing  Goal: Verbalize understanding of risk factor reduction measures to prevent injury from fall in the home  Outcome: Not Progressing  Goal: Use assistive devices by end of the shift  Outcome: Not Progressing  Goal: Pace activities to  prevent fatigue by end of the shift  Outcome: Not Progressing     Problem: Safety - Medical Restraint  Goal: Remains free of injury from restraints (Restraint for Interference with Medical Device)  Outcome: Not Progressing  Goal: Free from restraint(s) (Restraint for Interference with Medical Device)  Outcome: Not Progressing     Problem: General Stroke  Goal: Establish a mutual long term goal with patient by discharge  Outcome: Not Progressing  Goal: Demonstrate improvement in neurological exam throughout the shift  Outcome: Not Progressing  Goal: Maintain BP within ordered limits throughout shift  Outcome: Not Progressing  Goal: Participate in treatment (ie., meds, therapy) throughout shift  Outcome: Not Progressing  Goal: No symptoms of aspiration throughout shift  Outcome: Not Progressing  Goal: No symptoms of hemorrhage throughout shift  Outcome: Not Progressing  Goal: Tolerate enteral feeding throughout shift  Outcome: Not Progressing  Goal: Decreased nausea/vomiting throughout shift  Outcome: Not Progressing  Goal: Controlled blood glucose throughout shift  Outcome: Not Progressing  Goal: Out of bed three times today  Outcome: Not Progressing     Problem: ICU Stroke  Goal: Maintain ICP within ordered limits throughout shift  Outcome: Not Progressing  Goal: Tolerate EVD clamping trial throughout shift  Outcome: Not Progressing  Goal: Tolerate ventilator weaning trial during shift  Outcome: Not Progressing  Goal: Maintain patent airway throughout shift  Outcome: Not Progressing  Goal: Achieve/maintain targeted sodium level throughout shift  Outcome: Not Progressing     Problem: Pain - Adult  Goal: Verbalizes/displays adequate comfort level or baseline comfort level  Outcome: Not Progressing     Problem: Safety - Adult  Goal: Free from fall injury  Outcome: Not Progressing     Problem: Discharge Planning  Goal: Discharge to home or other facility with appropriate resources  Outcome: Not Progressing     Problem:  Chronic Conditions and Co-morbidities  Goal: Patient's chronic conditions and co-morbidity symptoms are monitored and maintained or improved  Outcome: Not Progressing     Problem: Diabetes  Goal: Achieve decreasing blood glucose levels by end of shift  Outcome: Not Progressing  Goal: Increase stability of blood glucose readings by end of shift  Outcome: Not Progressing  Goal: Decrease in ketones present in urine by end of shift  Outcome: Not Progressing  Goal: Maintain electrolyte levels within acceptable range throughout shift  Outcome: Not Progressing  Goal: Maintain glucose levels >70mg/dl to <250mg/dl throughout shift  Outcome: Not Progressing  Goal: No changes in neurological exam by end of shift  Outcome: Not Progressing  Goal: Learn about and adhere to nutrition recommendations by end of shift  Outcome: Not Progressing  Goal: Vital signs within normal range for age by end of shift  Outcome: Not Progressing  Goal: Increase self care and/or family involovement by end of shift  Outcome: Not Progressing  Goal: Receive DSME education by end of shift  Outcome: Not Progressing

## 2024-02-21 NOTE — SIGNIFICANT EVENT
Mr. Johnnie Rudolph is a 84 YO handedness unknown white male with PMH of HTN, DM2, atrial fibrillation (on Xarleto), and stroke who is currently admitted to trauma service for management of traumatic left frontal ICH. Epilepsy consulted for seizure like episode on 2/16/23. Patient subsequently found to be having multiple seizures 2/2 to R frontal ICH. Patient is now DNRCC.     4D Classification of the Paroxysmal Episodes:  EPE  Semiology: R Face clonic > R version  EZ: R Frontal Lobe  Etiology: Left F ICH   Co-morbidities: L MCA stroke, DM2      Recommendations:   -   Continue AED at the same rate for now, Keppra 2g BID, Lacosamide 100mg BID, Can do PO or IV as able.     - Can use IV Ativan PRN as needed to control seizures per comfort measure order.    - Please call for any questions or concerns    - Epilepsy will sign off    Shania Kim MD  PGY-3 Neurology  Epilepsy [id number]  [facility] [id number]

## 2024-02-21 NOTE — SIGNIFICANT EVENT
[facility]  TRAUMA ICU - GOALS OF CARE NOTE    Patient Name: Johnnie Rudolph  MRN: [id number]  : 1940  AGE: 83 y.o.   GENDER: male  ==============================================================================    GOALS OF CARE MEETING: [unfilled], 8:23 PM       Attendance:          Medical Staff:  Liudmila          Family: Angela Dwain           Patient: Present but does not have capacity        Medical Decision Maker: Angela       Baseline Code Status: DNR/DNI        OUTCOME:      Meeting Summary:     I with the patient's significant other Angela as well as the patient's stepson.  We reviewed the patient's clinical course over the past 9 days as well as his failure to improve.  Expressed my concern given the patient's prior expressed limitations about his ability to recover from this injury.  The patient had previously stated that he would not want to quality of life that would render him in a facility for a long period of time dependent on others.  I expressed that given the patient injuries if he was able to recover that would be the best possible outcome we could provide him.  Given that fact continued aggressive care that potentially could be causing discomfort, namely continued aggressive NT suctioning every hour, may not currently align with the patient's previously expressed wishes.    The family is in agreement that transition to comfort care would be in line with the patient's wishes.  I discussed with them the various scenarios in which this transition could take place at the patient may benefit/qualify for inpatient hospice closer to home which we could explore with social work/palliative care.  Family is receptive to this but would like to make the transition to comfort care while in the hospital while exploring these options.  Case was discussed with palliative care /NP.  Orders placed for comfort care.  Notably will also continue antiepileptics as these  can provide comfort and this was cost with the family.  Will discontinue tube feeds/IV fluids as these may provide discomfort in the actively dying patient.  Family aware and agreeable.  Discussed with bedside nurse.    I have personally spent 35 minutes of critical care time, exclusive of time spent on any procedures, in evaluation and management of this critically ill patient’s condition of TBI, advanced care planning .

## 2024-02-22 VITALS
DIASTOLIC BLOOD PRESSURE: 64 MMHG | OXYGEN SATURATION: 91 % | WEIGHT: 205.03 LBS | BODY MASS INDEX: 27.17 KG/M2 | TEMPERATURE: 98.6 F | HEART RATE: 115 BPM | RESPIRATION RATE: 18 BRPM | SYSTOLIC BLOOD PRESSURE: 128 MMHG | HEIGHT: 73 IN

## 2024-02-22 PROCEDURE — 2500000004 HC RX 250 GENERAL PHARMACY W/ HCPCS (ALT 636 FOR OP/ED)

## 2024-02-22 PROCEDURE — 2500000001 HC RX 250 WO HCPCS SELF ADMINISTERED DRUGS (ALT 637 FOR MEDICARE OP)

## 2024-02-22 PROCEDURE — 2500000005 HC RX 250 GENERAL PHARMACY W/O HCPCS

## 2024-02-22 RX ADMIN — LACOSAMIDE 100 MG: 10 INJECTION INTRAVENOUS at 06:23

## 2024-02-22 RX ADMIN — LORAZEPAM 2 MG: 2 INJECTION INTRAMUSCULAR; INTRAVENOUS at 00:48

## 2024-02-22 RX ADMIN — LEVETIRACETAM 2000 MG: 500 INJECTION, SOLUTION INTRAVENOUS at 09:10

## 2024-02-22 RX ADMIN — GLYCOPYRROLATE 0.4 MG: 0.2 INJECTION, SOLUTION INTRAMUSCULAR; INTRAVENOUS at 09:10

## 2024-02-22 RX ADMIN — GLYCOPYRROLATE 0.4 MG: 0.2 INJECTION, SOLUTION INTRAMUSCULAR; INTRAVENOUS at 03:23

## 2024-02-22 RX ADMIN — CHLORHEXIDINE GLUCONATE 15 ML: 1.2 SOLUTION ORAL at 09:09

## 2024-02-22 ASSESSMENT — PAIN SCALES - GENERAL: PAINLEVEL_OUTOF10: 4

## 2024-02-22 ASSESSMENT — PAIN SCALES - WONG BAKER: WONGBAKER_NUMERICALRESPONSE: HURTS LITTLE BIT

## 2024-02-22 ASSESSMENT — PAIN - FUNCTIONAL ASSESSMENT: PAIN_FUNCTIONAL_ASSESSMENT: FLACC (FACE, LEGS, ACTIVITY, CRY, CONSOLABILITY)

## 2024-02-22 NOTE — PROGRESS NOTES
[facility]  TRAUMA SERVICE - PROGRESS NOTE    Patient Name: Johnnie Rudolph  MRN: [id number]  Admit Date: 211  : 1940  AGE: 83 y.o.   GENDER: male  ==============================================================================    MECHANISM OF INJURY:   Fall down steps     LOC (yes/no?): Unknown  Anticoagulant / Anti-platelet Rx? (for what dx?): xarelto, AF and ischemic stroke hx (last dose am )  Referring Facility Name (N/A for scene EMR run): [facility]      INJURIES/problems:   -Large L frontal IPH  - Hx afib and ischemic stroke  on eliquis, htn, hld, type II DM, glaucoma     INCIDENTAL FINDINGS:  None     ==============================================================================  TODAY'S ASSESSMENT AND PLAN OF CARE:  DNR-comfort care, end of life care order set, discharging today to facility at 1300    Pt. seen and discussed with Dr. Ugalde.     Alistair Davis PA-C  Trauma Surgery  [id number]    ==============================================================================  CHIEF COMPLAINT / OVERNIGHT EVENTS:   Seizure overnight broke with Ativan    MEDICAL HISTORY / ROS:  Admission history and ROS reviewed. Pertinent changes as follows:  Unable to obtain ROS d/t mentation.     PHYSICAL EXAM:  Heart Rate:  []   Temp:  [36.3 °C (97.3 °F)-37.6 °C (99.7 °F)]   Resp:  [18-19]   BP: (128-146)/(64-72)   SpO2:  [90 %-92 %]   Physical Exam  Vitals reviewed.   Constitutional:       Comments: Somnolent, no marked distress   HENT:      Head: Normocephalic.      Right Ear: External ear normal.      Left Ear: External ear normal.      Nose: Nose normal.      Mouth/Throat:      Mouth: Mucous membranes are dry.   Cardiovascular:      Pulses: Normal pulses.   Pulmonary:      Comments: On 2L nc, respirations nonlabored, +audible upper airway secretions  Abdominal:      General: There is no distension.      Palpations: Abdomen is soft.   Genitourinary:     Comments: Catheter  draining yellow urine  Skin:     General: Skin is warm and dry.   Neurological:      Comments: GCS 8 (M5 with LUE, V2)             LABS:  Results from last 7 days   Lab Units 02/19/24  0914 02/18/24  1038 02/17/24  0412   WBC AUTO x10*3/uL 11.4* 10.4 14.2*   HEMOGLOBIN g/dL 11.6* 12.0* 11.7*   HEMATOCRIT % 35.0* 35.8* 33.0*   PLATELETS AUTO x10*3/uL 282 229 198       Results from last 7 days   Lab Units 02/19/24  0914 02/18/24  1038 02/17/24  0412   APTT seconds 43* 31 30   INR  1.4* 1.3* 1.4*       Results from last 7 days   Lab Units 02/19/24  0914 02/18/24  1038 02/17/24  0412   SODIUM mmol/L 134* 135* 136   POTASSIUM mmol/L 4.9 4.6 4.2   CHLORIDE mmol/L 97* 102 101   CO2 mmol/L 27 25 27   BUN mg/dL 14 14 21   CREATININE mg/dL 0.51 0.51 0.70   CALCIUM mg/dL 8.0* 8.3* 8.5*   GLUCOSE mg/dL 214* 184* 195*           Results from last 7 days   Lab Units 02/16/24  1311   POCT PH, ARTERIAL pH 7.46*   POCT PCO2, ARTERIAL mm Hg 35*   POCT PO2, ARTERIAL mm Hg 67*   POCT HCO3 CALCULATED, ARTERIAL mmol/L 24.9   POCT BASE EXCESS, ARTERIAL mmol/L 1.4         I have reviewed all medications, laboratory results, and imaging pertinent for today's encounter.         Alistair Davis PA-C

## 2024-02-22 NOTE — HOSPITAL COURSE
On 2/11 83M Johnnie Ronni on Afib (Eliquis) who feel down three steps earlier today with ?LOC. Initially presented to [facility] and found to have trace L frontal SAH with GCS 15. Plan had been observation with repeat CT. On my review of the repeat scan, there is significant worsening of the L frontal SAH, and a new left frontal hemorrhage. He remains GCS 15 on my exam, Aox2 (knew month but not year). Family at bedside feels that he is at neurologic baseline. He is getting Andexanet for reversal and we will admit to the ICU for close monitoring. NSG aware of the worsened scan. Patient remained stable until 2/16 when he was found to have a decrease in mental status, was taken for rCTH that had shown worsening bleed and NSGY were reengaged. Later that night on 2/16 the patient had a witness seizure that persisted for 30 seconds followed by post ictal appearance of sonorous respirations with persistent right gaze deviation. 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 One extended seizure (status) was seen beginning at 04:53 on 2/17/2024, eventually evolving to what appears as right versive > right head tonic seizure activity. Status broke on 2/17/2024 at 05:53:09. There was one additional seizure at 10:09:47 lasting ~3min, with no clear clinical activity.  2/19 Family meeting was had and palliative care were consulted who saw patient on 2/20 and are helping facilitate hospice at [facility].

## 2024-02-22 NOTE — CARE PLAN
The patient's goals for the shift include      The clinical goals for the shift include comfort care and suctioning

## 2024-02-22 NOTE — DISCHARGE SUMMARY
Discharge Diagnosis  Intraparenchymal hemorrhage of brain (CMS/HCC)    Issues Requiring Follow-Up  none    Test Results Pending At Discharge  Pending Labs       No current pending labs.            Hospital Course  On 2/11 83M Johnnie Ronni on Afib (Eliquis) who feel down three steps earlier today with ?LOC. Initially presented to [facility] and found to have trace L frontal SAH with GCS 15. Plan had been observation with repeat CT. On my review of the repeat scan, there is significant worsening of the L frontal SAH, and a new left frontal hemorrhage. He remains GCS 15 on my exam, Aox2 (knew month but not year). Family at bedside feels that he is at neurologic baseline. He is getting Andexanet for reversal and we will admit to the ICU for close monitoring. NSG aware of the worsened scan. Patient remained stable until 2/16 when he was found to have a decrease in mental status, was taken for rCTH that had shown worsening bleed and NSGY were reengaged. Later that night on 2/16 the patient had a witness seizure that persisted for 30 seconds followed by post ictal appearance of sonorous respirations with persistent right gaze deviation. 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 One extended seizure (status) was seen beginning at 04:53 on 2/17/2024, eventually evolving to what appears as right versive > right head tonic seizure activity. Status broke on 2/17/2024 at 05:53:09. There was one additional seizure at 10:09:47 lasting ~3min, with no clear clinical activity.  2/19 Family meeting was had and palliative care were consulted who saw patient on 2/20 and are helping facilitate hospice at [facility].Discharged to [facility]. DNR-comfort care    Pertinent Physical Exam At Time of Discharge  Physical Exam    Home Medications     Medication List      ASK your doctor about these medications     apixaban 5  mg tablet; Commonly known as: Eliquis; Take 1 tablet (5 mg)   by mouth 2 times a day.   atorvastatin 80 mg tablet; Commonly known as: Lipitor; Take 1 tablet (80   mg) by mouth once daily at bedtime.   glipiZIDE 5 mg tablet; Commonly known as: Glucotrol; Take 1 tablet (5   mg) by mouth 2 times a day.   lisinopriL-hydrochlorothiazide 20-12.5 mg tablet; Take 1 tablet by mouth   once daily.   metFORMIN 1,000 mg tablet; Commonly known as: Glucophage; Take 1 tablet   (1,000 mg) by mouth 2 times a day with meals.   metoprolol tartrate 25 mg tablet; Commonly known as: Lopressor; Take 1   tablet (25 mg) by mouth 2 times a day.   rivaroxaban 20 mg tablet; Commonly known as: Xarelto; Take 1 tablet (20   mg) by mouth once daily.       Outpatient Follow-Up  Future Appointments   Date Time Provider Department Center   2/26/2024  8:30 AM [id number] Mihir Davis PA-C

## 2024-02-22 NOTE — PROGRESS NOTES
Hospice Nursing Note     Johnnie Pérezuggs     Pt to leave via [facility] at 1300 and is going to [facility]. Family aware.    Thanks,    Katiana Gonzalez RN  [facility]

## 2024-02-26 ENCOUNTER — APPOINTMENT (OUTPATIENT)
Dept: PRIMARY CARE | Facility: CLINIC | Age: 84
End: 2024-02-26
Payer: MEDICARE